# Patient Record
Sex: FEMALE | Race: BLACK OR AFRICAN AMERICAN | Employment: FULL TIME | URBAN - METROPOLITAN AREA
[De-identification: names, ages, dates, MRNs, and addresses within clinical notes are randomized per-mention and may not be internally consistent; named-entity substitution may affect disease eponyms.]

---

## 2017-01-23 ENCOUNTER — NURSE ONLY (OUTPATIENT)
Dept: ALLERGY | Facility: CLINIC | Age: 37
End: 2017-01-23

## 2017-01-23 DIAGNOSIS — J30.89 ENVIRONMENTAL AND SEASONAL ALLERGIES: Primary | ICD-10-CM

## 2017-01-23 PROCEDURE — 95117 IMMUNOTHERAPY INJECTIONS: CPT | Performed by: ALLERGY & IMMUNOLOGY

## 2017-01-23 RX ORDER — LEVOCETIRIZINE DIHYDROCHLORIDE 5 MG/1
TABLET, FILM COATED ORAL
Qty: 90 TABLET | Refills: 0 | Status: SHIPPED | OUTPATIENT
Start: 2017-01-23 | End: 2017-02-22

## 2017-01-23 NOTE — TELEPHONE ENCOUNTER
Call reviewed and noted. Last seen in November 2016. Xyzal refilled ×90 days.   Patient will need follow-up in approximately 3 months

## 2017-01-23 NOTE — TELEPHONE ENCOUNTER
Informed patient that her medication was refilled x 90 days and she will be due for f/u in 3 months. She verbalized understanding.

## 2017-01-23 NOTE — TELEPHONE ENCOUNTER
Dr. Ron Alvarez,  Pt.  Requesting:  LEVOCETIRIZINE DIHYDROCHLORIDE 5 MG Oral Tab 90 tablet 1 8/1/2016      Sig :  TAKE 1 TABLET BY MOUTH NIGHTLY.      LOV:11/14/16  F/U appt: 5/2017  Last filled; 8/1/16 x 6 months       Please advise on refill request, Thank you

## 2017-02-06 ENCOUNTER — NURSE ONLY (OUTPATIENT)
Dept: ALLERGY | Facility: CLINIC | Age: 37
End: 2017-02-06

## 2017-02-06 DIAGNOSIS — J30.89 ENVIRONMENTAL AND SEASONAL ALLERGIES: Primary | ICD-10-CM

## 2017-02-06 PROCEDURE — 95117 IMMUNOTHERAPY INJECTIONS: CPT | Performed by: ALLERGY & IMMUNOLOGY

## 2017-02-06 PROCEDURE — 95165 ANTIGEN THERAPY SERVICES: CPT | Performed by: ALLERGY & IMMUNOLOGY

## 2017-02-13 ENCOUNTER — NURSE ONLY (OUTPATIENT)
Dept: ALLERGY | Facility: CLINIC | Age: 37
End: 2017-02-13

## 2017-02-13 DIAGNOSIS — J30.89 ENVIRONMENTAL AND SEASONAL ALLERGIES: Primary | ICD-10-CM

## 2017-02-13 PROCEDURE — 95117 IMMUNOTHERAPY INJECTIONS: CPT | Performed by: ALLERGY & IMMUNOLOGY

## 2017-02-20 ENCOUNTER — NURSE ONLY (OUTPATIENT)
Dept: ALLERGY | Facility: CLINIC | Age: 37
End: 2017-02-20

## 2017-02-20 DIAGNOSIS — J30.89 ENVIRONMENTAL AND SEASONAL ALLERGIES: Primary | ICD-10-CM

## 2017-02-20 PROCEDURE — 95117 IMMUNOTHERAPY INJECTIONS: CPT | Performed by: ALLERGY & IMMUNOLOGY

## 2017-02-22 RX ORDER — LEVOCETIRIZINE DIHYDROCHLORIDE 5 MG/1
TABLET, FILM COATED ORAL
Qty: 90 TABLET | Refills: 0 | Status: SHIPPED | OUTPATIENT
Start: 2017-02-22 | End: 2018-06-13

## 2017-02-22 RX ORDER — MONTELUKAST SODIUM 10 MG/1
TABLET ORAL
Qty: 90 TABLET | Refills: 0 | Status: SHIPPED | OUTPATIENT
Start: 2017-02-22 | End: 2017-04-06

## 2017-02-22 NOTE — TELEPHONE ENCOUNTER
Refill requested for:    LEVOCETIRIZINE DIHYDROCHLORIDE 5 MG Oral Tab 90 tablet 0 1/23/2017      Sig :  TAKE 1 TABLET BY MOUTH NIGHTLY.       Montelukast Sodium (SINGULAIR) 10 MG Oral Tab 90 tablet 0 11/14/2016       Sig :  Take 1 tablet (10 mg total) by mo

## 2017-02-22 NOTE — TELEPHONE ENCOUNTER
Noted.  Singulair and Xyzal refilled ×90 days.   Patient will need follow-up visit approximately 3 months

## 2017-02-27 ENCOUNTER — NURSE ONLY (OUTPATIENT)
Dept: ALLERGY | Facility: CLINIC | Age: 37
End: 2017-02-27

## 2017-02-27 DIAGNOSIS — J30.89 ENVIRONMENTAL AND SEASONAL ALLERGIES: Primary | ICD-10-CM

## 2017-02-27 PROCEDURE — 95117 IMMUNOTHERAPY INJECTIONS: CPT | Performed by: ALLERGY & IMMUNOLOGY

## 2017-03-06 ENCOUNTER — NURSE ONLY (OUTPATIENT)
Dept: ALLERGY | Facility: CLINIC | Age: 37
End: 2017-03-06

## 2017-03-06 DIAGNOSIS — J30.89 ENVIRONMENTAL AND SEASONAL ALLERGIES: Primary | ICD-10-CM

## 2017-03-06 PROCEDURE — 95117 IMMUNOTHERAPY INJECTIONS: CPT | Performed by: ALLERGY & IMMUNOLOGY

## 2017-03-13 ENCOUNTER — NURSE ONLY (OUTPATIENT)
Dept: ALLERGY | Facility: CLINIC | Age: 37
End: 2017-03-13

## 2017-03-13 DIAGNOSIS — J30.89 ENVIRONMENTAL AND SEASONAL ALLERGIES: Primary | ICD-10-CM

## 2017-03-13 PROCEDURE — 95165 ANTIGEN THERAPY SERVICES: CPT | Performed by: ALLERGY & IMMUNOLOGY

## 2017-03-13 PROCEDURE — 95117 IMMUNOTHERAPY INJECTIONS: CPT | Performed by: ALLERGY & IMMUNOLOGY

## 2017-03-16 RX ORDER — PSEUDOEPHEDRINE HCL 120 MG/1
120 TABLET, FILM COATED, EXTENDED RELEASE ORAL EVERY 12 HOURS
Qty: 60 TABLET | Refills: 1 | Status: SHIPPED
Start: 2017-03-16 | End: 2017-03-16

## 2017-03-16 NOTE — TELEPHONE ENCOUNTER
LM that rx approved and faxed as requested if any additional questions or concerns to contact office.

## 2017-03-16 NOTE — TELEPHONE ENCOUNTER
Dr. Doc Kawasaki,  Pt.  Requesting:   [facility] 12 HOUR NASAL DECONGESTANT 120 MG Oral Tablet 12 Hr 60 tablet 1 5/10/2016      Sig :  TAKE 1 TABLET BY MOUTH EVERY 12 HOURS       Route:   (none)       Comment:   This request is for a new prescription for a controlled sub

## 2017-03-16 NOTE — TELEPHONE ENCOUNTER
From: Arlette Collins  To: Chloé Goodman MD  Sent: 3/16/2017 2:33 PM CDT  Subject: Medication Renewal Request    Original authorizing provider: MD Arlette Desai would like a refill of the following medications:  [facility] 12 HOUR NASAL

## 2017-03-16 NOTE — TELEPHONE ENCOUNTER
Call reviewed and noted. Prescription refilled ×1 month with 1 refill.   Please fax over as a contained Sudafed

## 2017-03-20 ENCOUNTER — NURSE ONLY (OUTPATIENT)
Dept: ALLERGY | Facility: CLINIC | Age: 37
End: 2017-03-20

## 2017-03-20 DIAGNOSIS — J30.89 ENVIRONMENTAL AND SEASONAL ALLERGIES: Primary | ICD-10-CM

## 2017-03-20 PROCEDURE — 95117 IMMUNOTHERAPY INJECTIONS: CPT | Performed by: ALLERGY & IMMUNOLOGY

## 2017-03-20 RX ORDER — PSEUDOEPHEDRINE HCL 120 MG
TABLET, EXTENDED RELEASE ORAL
Qty: 60 TABLET | Refills: 5 | Status: SHIPPED
Start: 2017-03-20 | End: 2017-03-20

## 2017-03-20 NOTE — TELEPHONE ENCOUNTER
Noted. Pharmacy received a verbal order per Dr. Larry Hansen from previous approved refill request:               Approved      Disp  Refills  Start  End     Pseudoephedrine HCl ER ([facility] 12 HOUR NASAL DECONGESTANT) 120 MG Oral Tablet 12 Hr  60 tablet  1  3/16/2017

## 2017-03-20 NOTE — TELEPHONE ENCOUNTER
Received a duplicate refill request for:     Medications       Approved        Disp Refills Start End     Pseudoephedrine HCl ER ([facility] 12 HOUR NASAL DECONGESTANT) 120 MG Oral Tablet 12 Hr 60 tablet 1 3/16/2017      Sig - Route:   Take 1 tablet (120 mg total)

## 2017-04-03 ENCOUNTER — NURSE ONLY (OUTPATIENT)
Dept: ALLERGY | Facility: CLINIC | Age: 37
End: 2017-04-03

## 2017-04-03 DIAGNOSIS — J30.89 ENVIRONMENTAL AND SEASONAL ALLERGIES: Primary | ICD-10-CM

## 2017-04-03 PROCEDURE — 95117 IMMUNOTHERAPY INJECTIONS: CPT | Performed by: ALLERGY & IMMUNOLOGY

## 2017-04-06 ENCOUNTER — TELEPHONE (OUTPATIENT)
Dept: ALLERGY | Facility: CLINIC | Age: 37
End: 2017-04-06

## 2017-04-06 RX ORDER — MONTELUKAST SODIUM 10 MG/1
TABLET ORAL
Qty: 90 TABLET | Refills: 0 | Status: SHIPPED | OUTPATIENT
Start: 2017-04-06 | End: 2017-05-01

## 2017-04-06 NOTE — TELEPHONE ENCOUNTER
Fax from [facility] refill request for Montelukast 10mg once daily. Pt LOV 11/2016 due in 5/2017, has an appointment 5/1/2017. Medication last ordered 2/2017 would like script sent to mail order. Needs qty 90 for reduced price. Please advise.

## 2017-04-06 NOTE — TELEPHONE ENCOUNTER
Call reviewed and noted. Patient with follow-up appointment in May. Singular refill ×90 days and sent to [facility].

## 2017-04-06 NOTE — TELEPHONE ENCOUNTER
Spoke with  and gave him form for [employer] refill.  filled out form and faxed to [facility] for 90 day refill of Singulair. Spoke with Odell Cabrera at [facility] and cancelled Singulair sent earlier.

## 2017-04-10 ENCOUNTER — NURSE ONLY (OUTPATIENT)
Dept: ALLERGY | Facility: CLINIC | Age: 37
End: 2017-04-10

## 2017-04-10 DIAGNOSIS — J30.89 ENVIRONMENTAL AND SEASONAL ALLERGIES: Primary | ICD-10-CM

## 2017-04-10 PROCEDURE — 95117 IMMUNOTHERAPY INJECTIONS: CPT | Performed by: ALLERGY & IMMUNOLOGY

## 2017-04-17 ENCOUNTER — NURSE ONLY (OUTPATIENT)
Dept: ALLERGY | Facility: CLINIC | Age: 37
End: 2017-04-17

## 2017-04-17 DIAGNOSIS — J30.89 ENVIRONMENTAL AND SEASONAL ALLERGIES: Primary | ICD-10-CM

## 2017-04-17 PROCEDURE — 95117 IMMUNOTHERAPY INJECTIONS: CPT | Performed by: ALLERGY & IMMUNOLOGY

## 2017-05-01 ENCOUNTER — NURSE ONLY (OUTPATIENT)
Dept: ALLERGY | Facility: CLINIC | Age: 37
End: 2017-05-01

## 2017-05-01 ENCOUNTER — OFFICE VISIT (OUTPATIENT)
Dept: ALLERGY | Facility: CLINIC | Age: 37
End: 2017-05-01

## 2017-05-01 VITALS
DIASTOLIC BLOOD PRESSURE: 82 MMHG | TEMPERATURE: 99 F | SYSTOLIC BLOOD PRESSURE: 150 MMHG | HEIGHT: 62 IN | WEIGHT: 202 LBS | HEART RATE: 111 BPM | BODY MASS INDEX: 37.17 KG/M2 | OXYGEN SATURATION: 96 % | RESPIRATION RATE: 17 BRPM

## 2017-05-01 DIAGNOSIS — J30.1 SEASONAL ALLERGIC RHINITIS DUE TO POLLEN: ICD-10-CM

## 2017-05-01 DIAGNOSIS — J45.20 MILD INTERMITTENT ASTHMA WITHOUT COMPLICATION: Primary | ICD-10-CM

## 2017-05-01 DIAGNOSIS — Z91.09 ALLERGY TO AMERICAN HOUSE DUST MITE: ICD-10-CM

## 2017-05-01 DIAGNOSIS — J30.89 ENVIRONMENTAL AND SEASONAL ALLERGIES: Primary | ICD-10-CM

## 2017-05-01 PROCEDURE — 99212 OFFICE O/P EST SF 10 MIN: CPT | Performed by: ALLERGY & IMMUNOLOGY

## 2017-05-01 PROCEDURE — 99214 OFFICE O/P EST MOD 30 MIN: CPT | Performed by: ALLERGY & IMMUNOLOGY

## 2017-05-01 PROCEDURE — 95117 IMMUNOTHERAPY INJECTIONS: CPT | Performed by: ALLERGY & IMMUNOLOGY

## 2017-05-01 PROCEDURE — 95165 ANTIGEN THERAPY SERVICES: CPT | Performed by: ALLERGY & IMMUNOLOGY

## 2017-05-01 RX ORDER — PSEUDOEPHEDRINE HCL 120 MG/1
120 TABLET, FILM COATED, EXTENDED RELEASE ORAL EVERY 12 HOURS
Qty: 60 TABLET | Refills: 5 | Status: SHIPPED
Start: 2017-05-01 | End: 2017-12-27

## 2017-05-01 RX ORDER — MONTELUKAST SODIUM 10 MG/1
TABLET ORAL
Qty: 90 TABLET | Refills: 0 | Status: SHIPPED | OUTPATIENT
Start: 2017-05-01 | End: 2017-06-30

## 2017-05-01 NOTE — PROGRESS NOTES
Arlette Faulkner is a 31year old female. HPI:   No chief complaint on file. Patient is a 68-year-old female who presents for follow-up with a chief complaint of asthma and allergies.     Patient last seen by me on November 14, 2017    Patient has a hi LEVOCETIRIZINE DIHYDROCHLORIDE 5 MG Oral Tab TAKE 1 TABLET BY MOUTH NIGHTLY. Disp: 90 tablet Rfl: 0   Fluticasone Propionate (FLONASE) 50 MCG/ACT Nasal Suspension 2 sprays by Nasal route daily.  Disp: 1 Bottle Rfl: 5   Etodolac 400 MG Oral Tab  Disp:  Rfl without complication  (primary encounter diagnosis)  Seasonal allergic rhinitis due to pollen  Allergy to american house dust mite    1.  Asthma  Mild intermittent   Stable at this time  No ed or pred in interim   -400      Recs: continue with singula

## 2017-05-08 ENCOUNTER — NURSE ONLY (OUTPATIENT)
Dept: ALLERGY | Facility: CLINIC | Age: 37
End: 2017-05-08

## 2017-05-08 DIAGNOSIS — J30.89 ENVIRONMENTAL AND SEASONAL ALLERGIES: Primary | ICD-10-CM

## 2017-05-08 PROCEDURE — 95117 IMMUNOTHERAPY INJECTIONS: CPT | Performed by: ALLERGY & IMMUNOLOGY

## 2017-05-20 ENCOUNTER — NURSE ONLY (OUTPATIENT)
Dept: ALLERGY | Facility: CLINIC | Age: 37
End: 2017-05-20

## 2017-05-20 DIAGNOSIS — J30.89 ENVIRONMENTAL AND SEASONAL ALLERGIES: Primary | ICD-10-CM

## 2017-05-20 PROCEDURE — 95117 IMMUNOTHERAPY INJECTIONS: CPT | Performed by: ALLERGY & IMMUNOLOGY

## 2017-06-05 ENCOUNTER — TELEPHONE (OUTPATIENT)
Dept: ALLERGY | Facility: CLINIC | Age: 37
End: 2017-06-05

## 2017-06-05 NOTE — TELEPHONE ENCOUNTER
Patient was here in office for AIT. Stated she has a \"cold\" with a c/o of sore throat, post nasal drip, and body aches. No fever. Stated she prefers to wait until she feels better to receive AIT. Patient currently on maintenance dose every 2-3 weeks.

## 2017-06-06 ENCOUNTER — PATIENT MESSAGE (OUTPATIENT)
Dept: FAMILY MEDICINE CLINIC | Facility: CLINIC | Age: 37
End: 2017-06-06

## 2017-06-06 ENCOUNTER — PATIENT MESSAGE (OUTPATIENT)
Dept: ALLERGY | Facility: CLINIC | Age: 37
End: 2017-06-06

## 2017-06-07 NOTE — TELEPHONE ENCOUNTER
From: Arlette Collins  To: Patrick Freeman MD  Sent: 6/6/2017 8:51 PM CDT  Subject: Other    Doris Job Dr Landry Mata,    I still have a sour throat from Sunday. When I left the office on Monday , I actually had a fever.  I have an appointment at 11:40 with my prima

## 2017-06-07 NOTE — TELEPHONE ENCOUNTER
No available appointments for schedule 6/7/17. [employer] message sent to pt informing and advising to keep appointment with PCP.

## 2017-06-09 ENCOUNTER — TELEPHONE (OUTPATIENT)
Dept: INTERNAL MEDICINE CLINIC | Facility: CLINIC | Age: 37
End: 2017-06-09

## 2017-06-09 NOTE — TELEPHONE ENCOUNTER
lmtcb transfer acute, needs further assessment and triage.   [employer] message also sent      From: Arlette Collins  To: Alice Zamorano DO  Sent: 6/6/2017 8:25 AM CDT  Subject: Non-Urgent Medical Question    Hello,     I've had a sour throat for about 2 d

## 2017-06-09 NOTE — TELEPHONE ENCOUNTER
From: Arlette Collins  To: Khushi Sharma DO  Sent: 6/6/2017 8:25 AM CDT  Subject: Non-Urgent Medical Question    Hello,     I've had a sour throat for about 2 days and been fighting a fever since yesterday. Is it possible to see the doctor today?  I ca

## 2017-06-12 ENCOUNTER — NURSE ONLY (OUTPATIENT)
Dept: ALLERGY | Facility: CLINIC | Age: 37
End: 2017-06-12

## 2017-06-12 DIAGNOSIS — J30.89 ENVIRONMENTAL AND SEASONAL ALLERGIES: Primary | ICD-10-CM

## 2017-06-12 PROCEDURE — 95117 IMMUNOTHERAPY INJECTIONS: CPT | Performed by: ALLERGY & IMMUNOLOGY

## 2017-06-12 NOTE — TELEPHONE ENCOUNTER
Pt feeling better now , seen and treated by another physician. Informed, Moving forward, when an acute situation arises or a change in your condition occurs, it is most important to call the office and speak with a nurse directly.   These messages may sit

## 2017-06-30 RX ORDER — MONTELUKAST SODIUM 10 MG/1
TABLET ORAL
Qty: 90 TABLET | Refills: 0 | Status: SHIPPED | OUTPATIENT
Start: 2017-06-30 | End: 2017-09-28

## 2017-06-30 NOTE — TELEPHONE ENCOUNTER
Left a message for patient to please contact our office to clarify which pharmacy she would like refill of Singulair sent as RX was sent to a different pharmacy  ( [address]) on 5-1-17. received refill request from [employer] order.   Left message t

## 2017-06-30 NOTE — TELEPHONE ENCOUNTER
Pt got the VM and stts the Correct pharm for this [address] [facility]  Pt is aware office is closed until Monday

## 2017-07-03 NOTE — TELEPHONE ENCOUNTER
LM, notified her of Dr. Lisa Ruiz message as written below. Please call with any further questions or concerns.

## 2017-07-17 ENCOUNTER — NURSE ONLY (OUTPATIENT)
Dept: ALLERGY | Facility: CLINIC | Age: 37
End: 2017-07-17

## 2017-07-17 DIAGNOSIS — J30.89 ENVIRONMENTAL AND SEASONAL ALLERGIES: ICD-10-CM

## 2017-07-17 PROCEDURE — 95117 IMMUNOTHERAPY INJECTIONS: CPT | Performed by: ALLERGY & IMMUNOLOGY

## 2017-08-07 ENCOUNTER — NURSE ONLY (OUTPATIENT)
Dept: ALLERGY | Facility: CLINIC | Age: 37
End: 2017-08-07

## 2017-08-07 DIAGNOSIS — J30.89 ENVIRONMENTAL AND SEASONAL ALLERGIES: ICD-10-CM

## 2017-08-07 PROCEDURE — 95117 IMMUNOTHERAPY INJECTIONS: CPT | Performed by: ALLERGY & IMMUNOLOGY

## 2017-08-07 PROCEDURE — 95165 ANTIGEN THERAPY SERVICES: CPT | Performed by: ALLERGY & IMMUNOLOGY

## 2017-08-28 ENCOUNTER — NURSE ONLY (OUTPATIENT)
Dept: ALLERGY | Facility: CLINIC | Age: 37
End: 2017-08-28

## 2017-08-28 DIAGNOSIS — J30.89 ENVIRONMENTAL AND SEASONAL ALLERGIES: ICD-10-CM

## 2017-08-28 PROCEDURE — 95117 IMMUNOTHERAPY INJECTIONS: CPT | Performed by: ALLERGY & IMMUNOLOGY

## 2017-09-25 ENCOUNTER — NURSE ONLY (OUTPATIENT)
Dept: ALLERGY | Facility: CLINIC | Age: 37
End: 2017-09-25

## 2017-09-25 DIAGNOSIS — J30.89 ENVIRONMENTAL AND SEASONAL ALLERGIES: ICD-10-CM

## 2017-09-25 PROCEDURE — 95117 IMMUNOTHERAPY INJECTIONS: CPT | Performed by: ALLERGY & IMMUNOLOGY

## 2017-09-28 RX ORDER — MONTELUKAST SODIUM 10 MG/1
TABLET ORAL
Qty: 90 TABLET | Refills: 0 | Status: SHIPPED | OUTPATIENT
Start: 2017-09-28 | End: 2017-12-27

## 2017-09-28 NOTE — TELEPHONE ENCOUNTER
Call reviewed and noted. Singular refill ×90 days. Patient due for six-month follow-up in November 2017.   Please schedule

## 2017-09-28 NOTE — TELEPHONE ENCOUNTER
Refill requested for MONTELUKAST TAB 10MG  Will file in chart as: MONTELUKAST SODIUM 10 MG Oral Tab  TAKE 1 TABLET DAILY       Disp: 90 tablet Refills: 0    Class: Normal Start: 9/28/2017   Originally ordered: 10 months ago by Caio Ortiz MD  Last ref

## 2017-10-02 NOTE — TELEPHONE ENCOUNTER
Refill requested for [facility] 12HR DECONGEST 120 MG CPLT  Will file in chart as: PSEUDOEPHEDRINE HCL  MG Oral Tablet 12 Hr  TAKE 1 TABLET BY MOUTH EVERY 12 HOURS       Disp: 60 tablet Refills:     Class: Normal Start: 10/2/2017   Originally ordered: 5 months ago by Anabel Arrieta MD  Last refill: 7/13/2017  To pharmacy: This request is for a new prescription for a controlled substance as required by Federal/State law. Last office visit: 5/1/2017    Previously advised to follow up in Follow-up in 6 months or sooner if needed    F/U currently scheduled? No  Date of last refill:  7/13/2017    ACTION: Routed to Dr. Cookie Kinney for review.

## 2017-11-04 ENCOUNTER — NURSE ONLY (OUTPATIENT)
Dept: ALLERGY | Facility: CLINIC | Age: 37
End: 2017-11-04

## 2017-11-04 DIAGNOSIS — J30.9 ALLERGIC RHINITIS, UNSPECIFIED CHRONICITY, UNSPECIFIED SEASONALITY, UNSPECIFIED TRIGGER: ICD-10-CM

## 2017-11-04 PROCEDURE — 90686 IIV4 VACC NO PRSV 0.5 ML IM: CPT | Performed by: ALLERGY & IMMUNOLOGY

## 2017-11-04 PROCEDURE — 95117 IMMUNOTHERAPY INJECTIONS: CPT | Performed by: ALLERGY & IMMUNOLOGY

## 2017-11-04 PROCEDURE — 90471 IMMUNIZATION ADMIN: CPT | Performed by: ALLERGY & IMMUNOLOGY

## 2017-11-27 ENCOUNTER — NURSE ONLY (OUTPATIENT)
Dept: ALLERGY | Facility: CLINIC | Age: 37
End: 2017-11-27

## 2017-11-27 DIAGNOSIS — J30.81 ALLERGIC RHINITIS DUE TO ANIMAL HAIR AND DANDER, UNSPECIFIED CHRONICITY: ICD-10-CM

## 2017-11-27 PROCEDURE — 95117 IMMUNOTHERAPY INJECTIONS: CPT | Performed by: ALLERGY & IMMUNOLOGY

## 2017-12-04 ENCOUNTER — NURSE ONLY (OUTPATIENT)
Dept: ALLERGY | Facility: CLINIC | Age: 37
End: 2017-12-04

## 2017-12-04 DIAGNOSIS — J30.9 ALLERGIC RHINITIS, UNSPECIFIED CHRONICITY, UNSPECIFIED SEASONALITY, UNSPECIFIED TRIGGER: ICD-10-CM

## 2017-12-04 PROCEDURE — 95117 IMMUNOTHERAPY INJECTIONS: CPT | Performed by: ALLERGY & IMMUNOLOGY

## 2017-12-04 PROCEDURE — 95165 ANTIGEN THERAPY SERVICES: CPT | Performed by: ALLERGY & IMMUNOLOGY

## 2017-12-11 ENCOUNTER — NURSE ONLY (OUTPATIENT)
Dept: ALLERGY | Facility: CLINIC | Age: 37
End: 2017-12-11

## 2017-12-11 DIAGNOSIS — J30.89 ENVIRONMENTAL AND SEASONAL ALLERGIES: ICD-10-CM

## 2017-12-11 PROCEDURE — 95117 IMMUNOTHERAPY INJECTIONS: CPT | Performed by: ALLERGY & IMMUNOLOGY

## 2017-12-27 ENCOUNTER — OFFICE VISIT (OUTPATIENT)
Dept: ALLERGY | Facility: CLINIC | Age: 37
End: 2017-12-27

## 2017-12-27 ENCOUNTER — NURSE ONLY (OUTPATIENT)
Dept: ALLERGY | Facility: CLINIC | Age: 37
End: 2017-12-27

## 2017-12-27 VITALS
HEIGHT: 62 IN | WEIGHT: 196 LBS | OXYGEN SATURATION: 97 % | RESPIRATION RATE: 16 BRPM | TEMPERATURE: 99 F | SYSTOLIC BLOOD PRESSURE: 126 MMHG | DIASTOLIC BLOOD PRESSURE: 84 MMHG | BODY MASS INDEX: 36.07 KG/M2 | HEART RATE: 117 BPM

## 2017-12-27 DIAGNOSIS — J30.89 ENVIRONMENTAL AND SEASONAL ALLERGIES: ICD-10-CM

## 2017-12-27 DIAGNOSIS — J30.1 CHRONIC SEASONAL ALLERGIC RHINITIS DUE TO POLLEN: ICD-10-CM

## 2017-12-27 DIAGNOSIS — J45.20 MILD INTERMITTENT ASTHMA WITHOUT COMPLICATION: Primary | ICD-10-CM

## 2017-12-27 DIAGNOSIS — Z91.09 ALLERGY TO AMERICAN HOUSE DUST MITE: ICD-10-CM

## 2017-12-27 PROCEDURE — 95117 IMMUNOTHERAPY INJECTIONS: CPT | Performed by: ALLERGY & IMMUNOLOGY

## 2017-12-27 PROCEDURE — 99214 OFFICE O/P EST MOD 30 MIN: CPT | Performed by: ALLERGY & IMMUNOLOGY

## 2017-12-27 PROCEDURE — 99212 OFFICE O/P EST SF 10 MIN: CPT | Performed by: ALLERGY & IMMUNOLOGY

## 2017-12-27 PROCEDURE — 94010 BREATHING CAPACITY TEST: CPT | Performed by: ALLERGY & IMMUNOLOGY

## 2017-12-27 RX ORDER — FLUTICASONE PROPIONATE 50 MCG
2 SPRAY, SUSPENSION (ML) NASAL DAILY
Qty: 1 BOTTLE | Refills: 5 | Status: SHIPPED | OUTPATIENT
Start: 2017-12-27 | End: 2021-11-25

## 2017-12-27 RX ORDER — MONTELUKAST SODIUM 10 MG/1
TABLET ORAL
Qty: 90 TABLET | Refills: 1 | Status: SHIPPED | OUTPATIENT
Start: 2017-12-27 | End: 2018-06-13

## 2017-12-27 RX ORDER — PSEUDOEPHEDRINE HCL 120 MG/1
120 TABLET, FILM COATED, EXTENDED RELEASE ORAL EVERY 12 HOURS
Qty: 60 TABLET | Refills: 5 | Status: SHIPPED
Start: 2017-12-27 | End: 2018-10-04

## 2017-12-27 NOTE — TELEPHONE ENCOUNTER
Dr. Mukund Cole, pt has appt scheduled today [email or web address]. Please advise if you will address at that time.  Thank you

## 2017-12-27 NOTE — PROGRESS NOTES
Arlette Santos is a 30year old female. HPI:   No chief complaint on file. Patient is over 10 minutes late for her appointment    Patient is a 62-year-old who presents for follow-up with a chief complaint of asthma and allergies.     Patient has a hist Pseudoephedrine HCl  MG Oral Tablet 12 Hr Take 1 tablet (120 mg total) by mouth every 12 (twelve) hours. Disp: 60 tablet Rfl: 5   LEVOCETIRIZINE DIHYDROCHLORIDE 5 MG Oral Tab TAKE 1 TABLET BY MOUTH NIGHTLY.  Disp: 90 tablet Rfl: 0   Fluticasone Prop no unusual rashes present   Extremities: no edema, cyanosis, or clubbing     ASSESSMENT/PLAN:   Assessment   Mild intermittent asthma without complication  (primary encounter diagnosis)  Chronic seasonal allergic rhinitis due to pollen  Allergy to [place]

## 2018-01-06 ENCOUNTER — PATIENT MESSAGE (OUTPATIENT)
Dept: ALLERGY | Facility: CLINIC | Age: 38
End: 2018-01-06

## 2018-01-06 RX ORDER — ALBUTEROL SULFATE 90 UG/1
2 AEROSOL, METERED RESPIRATORY (INHALATION) EVERY 4 HOURS PRN
Qty: 1 INHALER | Refills: 11 | Status: SHIPPED | OUTPATIENT
Start: 2018-01-06 | End: 2018-04-19

## 2018-01-06 NOTE — TELEPHONE ENCOUNTER
Patient calling and states that she run out  albuterol inhaler and requesting MD to refill it, medication was originally ordered by Dr Cristian Middleton on 3/5/14 but MD is not available,advised that will send the message to Md.       Please reply to alvina: ANG Vargas

## 2018-01-06 NOTE — TELEPHONE ENCOUNTER
Pt calling to inform us that she is completely out of medication. Pt informed Dr. Danna Camacho is out of the office until Tuesday and it may have to be approved by PCP. Pt requesting call back with outcome. Please advise.

## 2018-01-08 ENCOUNTER — NURSE ONLY (OUTPATIENT)
Dept: ALLERGY | Facility: CLINIC | Age: 38
End: 2018-01-08

## 2018-01-08 DIAGNOSIS — J30.89 ENVIRONMENTAL AND SEASONAL ALLERGIES: ICD-10-CM

## 2018-01-08 PROCEDURE — 95117 IMMUNOTHERAPY INJECTIONS: CPT | Performed by: INTERNAL MEDICINE

## 2018-01-08 NOTE — TELEPHONE ENCOUNTER
FYI: Dr. Dima Conner rx refilled by PCP x 1 with 11 refills.    Grey Roman,    Medication Detail     Medication Quantity Refills Start End   Albuterol Sulfate HFA (PROAIR HFA) 108 (90 Base) MCG/ACT Inhalation Aero Soln 1 Inhaler 11

## 2018-01-09 RX ORDER — ALBUTEROL SULFATE 90 UG/1
2 AEROSOL, METERED RESPIRATORY (INHALATION) EVERY 6 HOURS PRN
Qty: 1 INHALER | Refills: 0 | Status: SHIPPED | OUTPATIENT
Start: 2018-01-09 | End: 2018-06-13

## 2018-01-23 ENCOUNTER — HOSPITAL ENCOUNTER (EMERGENCY)
Facility: HOSPITAL | Age: 38
Discharge: HOME | End: 2018-01-23
Attending: EMERGENCY MEDICINE
Payer: COMMERCIAL

## 2018-01-23 ENCOUNTER — NURSE TRIAGE (OUTPATIENT)
Dept: OTHER | Age: 38
End: 2018-01-23

## 2018-01-23 VITALS
HEART RATE: 106 BPM | OXYGEN SATURATION: 99 % | HEIGHT: 62 IN | BODY MASS INDEX: 35.33 KG/M2 | TEMPERATURE: 99 F | SYSTOLIC BLOOD PRESSURE: 129 MMHG | DIASTOLIC BLOOD PRESSURE: 74 MMHG | RESPIRATION RATE: 18 BRPM | WEIGHT: 192 LBS

## 2018-01-23 DIAGNOSIS — K52.9 GASTROENTERITIS: Primary | ICD-10-CM

## 2018-01-23 PROCEDURE — 96361 HYDRATE IV INFUSION ADD-ON: CPT

## 2018-01-23 PROCEDURE — 96375 TX/PRO/DX INJ NEW DRUG ADDON: CPT

## 2018-01-23 PROCEDURE — 99285 EMERGENCY DEPT VISIT HI MDM: CPT

## 2018-01-23 PROCEDURE — 96374 THER/PROPH/DIAG INJ IV PUSH: CPT

## 2018-01-23 PROCEDURE — S0028 INJECTION, FAMOTIDINE, 20 MG: HCPCS | Performed by: EMERGENCY MEDICINE

## 2018-01-23 RX ORDER — ONDANSETRON 4 MG/1
4 TABLET, ORALLY DISINTEGRATING ORAL EVERY 4 HOURS PRN
Qty: 15 TABLET | Refills: 0 | Status: SHIPPED | OUTPATIENT
Start: 2018-01-23 | End: 2018-03-16

## 2018-01-23 RX ORDER — ONDANSETRON 2 MG/ML
4 INJECTION INTRAMUSCULAR; INTRAVENOUS ONCE
Status: COMPLETED | OUTPATIENT
Start: 2018-01-23 | End: 2018-01-23

## 2018-01-23 RX ORDER — RANITIDINE 150 MG/1
150 TABLET ORAL EVERY 12 HOURS
Qty: 20 TABLET | Refills: 0 | Status: SHIPPED | OUTPATIENT
Start: 2018-01-23 | End: 2018-02-02

## 2018-01-23 RX ORDER — ONDANSETRON 4 MG/1
4 TABLET, ORALLY DISINTEGRATING ORAL ONCE
Status: COMPLETED | OUTPATIENT
Start: 2018-01-23 | End: 2018-01-23

## 2018-01-23 RX ORDER — FAMOTIDINE 10 MG/ML
20 INJECTION, SOLUTION INTRAVENOUS ONCE
Status: COMPLETED | OUTPATIENT
Start: 2018-01-23 | End: 2018-01-23

## 2018-01-23 NOTE — TELEPHONE ENCOUNTER
Action Requested: Summary for Provider     []  Critical Lab, Recommendations Needed  [] Need Additional Advice  []   FYI    []   Need Orders  [] Need Medications Sent to Pharmacy  []  Other     SUMMARY: sent to ER- vomiting since 6PM  Yesterday- last vomit

## 2018-01-23 NOTE — ED INITIAL ASSESSMENT (HPI)
Pt to ER with c/o cough, vomiting, diarrhea, fever, and body aches since 6 pm yesterday. Pt states fever this am 101. No medication taken for fever this am per patient. No respiratory distress noted. Pt is alert and oriented x4.

## 2018-01-23 NOTE — ED PROVIDER NOTES
Patient Seen in: [facility] Emergency Department    History   Patient presents with:  Nausea/Vomiting/Diarrhea (gastrointestinal)    Stated Complaint: sent by pcp for dehydration    HPI    Patient complains of vomiting, this began last night, non • Colon Cancer Other      close relative       Smoking status: Never Smoker                                                              Smokeless tobacco: Never Used                      Comment: No household smokers.    Alcohol use: Yes           0.5 oz Prescribed:  Discharge Medication List as of 1/23/2018  5:37 PM    START taking these medications    RaNITidine HCl 150 MG Oral Tab  Take 1 tablet (150 mg total) by mouth every 12 (twelve) hours. , Print Script, Disp-20 tablet, R-0    ondansetron 4 MG Oral

## 2018-01-23 NOTE — ED NOTES
Patient arrives with complaints of N/V/D/F since yesterday. Complains of body aches and chills. Per pt, is unable to keep any oral intake. Per pt, attempted to take Pepto bismol, but was not able to keep it down. Denies SOB/CP.  [facility]

## 2018-01-29 ENCOUNTER — NURSE ONLY (OUTPATIENT)
Dept: ALLERGY | Facility: CLINIC | Age: 38
End: 2018-01-29

## 2018-01-29 DIAGNOSIS — J30.89 ENVIRONMENTAL AND SEASONAL ALLERGIES: ICD-10-CM

## 2018-01-29 PROCEDURE — 95117 IMMUNOTHERAPY INJECTIONS: CPT | Performed by: ALLERGY & IMMUNOLOGY

## 2018-02-12 ENCOUNTER — NURSE ONLY (OUTPATIENT)
Dept: ALLERGY | Facility: CLINIC | Age: 38
End: 2018-02-12

## 2018-02-12 DIAGNOSIS — J30.9 ALLERGIC RHINITIS, UNSPECIFIED SEASONALITY, UNSPECIFIED TRIGGER: ICD-10-CM

## 2018-02-12 PROCEDURE — 95117 IMMUNOTHERAPY INJECTIONS: CPT | Performed by: ALLERGY & IMMUNOLOGY

## 2018-02-12 PROCEDURE — 95165 ANTIGEN THERAPY SERVICES: CPT | Performed by: ALLERGY & IMMUNOLOGY

## 2018-02-26 ENCOUNTER — NURSE ONLY (OUTPATIENT)
Dept: ALLERGY | Facility: CLINIC | Age: 38
End: 2018-02-26

## 2018-02-26 DIAGNOSIS — J30.89 ENVIRONMENTAL AND SEASONAL ALLERGIES: ICD-10-CM

## 2018-02-26 PROCEDURE — 95117 IMMUNOTHERAPY INJECTIONS: CPT | Performed by: ALLERGY & IMMUNOLOGY

## 2018-03-16 ENCOUNTER — OFFICE VISIT (OUTPATIENT)
Dept: OBGYN CLINIC | Facility: CLINIC | Age: 38
End: 2018-03-16

## 2018-03-16 VITALS
SYSTOLIC BLOOD PRESSURE: 122 MMHG | HEIGHT: 62 IN | BODY MASS INDEX: 36.03 KG/M2 | DIASTOLIC BLOOD PRESSURE: 70 MMHG | WEIGHT: 195.81 LBS

## 2018-03-16 DIAGNOSIS — T83.32XA INTRAUTERINE CONTRACEPTIVE DEVICE THREADS LOST, INITIAL ENCOUNTER: ICD-10-CM

## 2018-03-16 DIAGNOSIS — Z01.419 WELL WOMAN EXAM WITH ROUTINE GYNECOLOGICAL EXAM: Primary | ICD-10-CM

## 2018-03-16 DIAGNOSIS — Z01.419 ENCOUNTER FOR GYNECOLOGICAL EXAMINATION WITHOUT ABNORMAL FINDING: ICD-10-CM

## 2018-03-16 PROCEDURE — 99395 PREV VISIT EST AGE 18-39: CPT | Performed by: OBSTETRICS & GYNECOLOGY

## 2018-03-16 NOTE — PROGRESS NOTES
HPI:    Patient ID: April R Nicolette Iglesias is a 30year old female. HPI  Patient here for routine exam.  Reports that needs Mirena IUD out. Had some pain in December right after menses but did not come in. IUD strings not visible on exam today.   Will send fo present. Cardiovascular: Normal rate, regular rhythm and normal heart sounds. No murmur heard. Pulmonary/Chest: Effort normal and breath sounds normal.   Breast Exam:  No masses. No nipple discharge. No adenopathy. Abdominal: Soft.  Bowel sounds a

## 2018-03-20 ENCOUNTER — TELEPHONE (OUTPATIENT)
Dept: OBGYN CLINIC | Facility: CLINIC | Age: 38
End: 2018-03-20

## 2018-03-20 ENCOUNTER — NURSE ONLY (OUTPATIENT)
Dept: ALLERGY | Facility: CLINIC | Age: 38
End: 2018-03-20

## 2018-03-20 ENCOUNTER — HOSPITAL ENCOUNTER (OUTPATIENT)
Dept: ULTRASOUND IMAGING | Facility: HOSPITAL | Age: 38
Discharge: HOME | End: 2018-03-20
Attending: OBSTETRICS & GYNECOLOGY
Payer: COMMERCIAL

## 2018-03-20 DIAGNOSIS — T83.32XA INTRAUTERINE CONTRACEPTIVE DEVICE THREADS LOST, INITIAL ENCOUNTER: ICD-10-CM

## 2018-03-20 DIAGNOSIS — J30.89 ENVIRONMENTAL AND SEASONAL ALLERGIES: ICD-10-CM

## 2018-03-20 LAB — HPV I/H RISK 1 DNA SPEC QL NAA+PROBE: NEGATIVE

## 2018-03-20 PROCEDURE — 95117 IMMUNOTHERAPY INJECTIONS: CPT | Performed by: ALLERGY & IMMUNOLOGY

## 2018-03-20 PROCEDURE — 76856 US EXAM PELVIC COMPLETE: CPT | Performed by: OBSTETRICS & GYNECOLOGY

## 2018-03-20 PROCEDURE — 76830 TRANSVAGINAL US NON-OB: CPT | Performed by: OBSTETRICS & GYNECOLOGY

## 2018-03-20 NOTE — TELEPHONE ENCOUNTER
Wayne Lilly MD  [facility] Clinical [facility]             Pap shows ASCUS but Negative HPV.  This is considered a normal pap.  To repeat pap in one year.  Notify patient

## 2018-03-23 ENCOUNTER — TELEPHONE (OUTPATIENT)
Dept: OBGYN CLINIC | Facility: CLINIC | Age: 38
End: 2018-03-23

## 2018-03-23 NOTE — TELEPHONE ENCOUNTER
Patient informed of pelvic U/S showing IUD in lower uterine segment and of the three moderately sized fibroids.   Discussed making appointment to try and remove IUD in office but if unsuccessful would schedule through [facility] a Hysteroscopic IUD removal.  Radha

## 2018-03-26 ENCOUNTER — TELEPHONE (OUTPATIENT)
Dept: OBGYN CLINIC | Facility: CLINIC | Age: 38
End: 2018-03-26

## 2018-04-03 ENCOUNTER — TELEPHONE (OUTPATIENT)
Dept: ALLERGY | Facility: CLINIC | Age: 38
End: 2018-04-03

## 2018-04-03 NOTE — TELEPHONE ENCOUNTER
Pt is calling requesting a refill on medication MONTELUKAST SODIUM 10 MG Oral Tab  Pt state that she call the pharm and they inform to that she would have to call the office to request  Please advise

## 2018-04-06 ENCOUNTER — OFFICE VISIT (OUTPATIENT)
Dept: OBGYN CLINIC | Facility: CLINIC | Age: 38
End: 2018-04-06

## 2018-04-06 ENCOUNTER — TELEPHONE (OUTPATIENT)
Dept: OBGYN CLINIC | Facility: CLINIC | Age: 38
End: 2018-04-06

## 2018-04-06 VITALS — SYSTOLIC BLOOD PRESSURE: 116 MMHG | DIASTOLIC BLOOD PRESSURE: 68 MMHG | BODY MASS INDEX: 36 KG/M2 | WEIGHT: 196 LBS

## 2018-04-06 DIAGNOSIS — T83.32XA INTRAUTERINE CONTRACEPTIVE DEVICE THREADS LOST, INITIAL ENCOUNTER: Primary | ICD-10-CM

## 2018-04-06 DIAGNOSIS — T83.32XD INTRAUTERINE CONTRACEPTIVE DEVICE THREADS LOST, SUBSEQUENT ENCOUNTER: Primary | ICD-10-CM

## 2018-04-06 PROCEDURE — 99213 OFFICE O/P EST LOW 20 MIN: CPT | Performed by: OBSTETRICS & GYNECOLOGY

## 2018-04-06 RX ORDER — SODIUM CHLORIDE 0.9 % (FLUSH) 0.9 %
10 SYRINGE (ML) INJECTION AS NEEDED
Status: CANCELLED | OUTPATIENT
Start: 2018-04-06

## 2018-04-06 NOTE — TELEPHONE ENCOUNTER
Please schedule patient through [facility] on Wednesday, 4/25/2018, for a Hysteroscopic IUD removal, Possible Exploratory Laparotomy. Diagnosis is Lost IUD threads. No assist needed.   Also please send patient for CBC and T & S a day or two prior to surgery to t

## 2018-04-06 NOTE — PROGRESS NOTES
HPI:    Patient ID: April R Eduin Santo is a 30year old female. HPI  Patient here for F/U after Pelvic U/S shows Mirena IUD in low segment.   Patient here for another attempt for removal in office before scheduling Hysteroscopic IUD removal.  Risks of surge EXAM:   Physical Exam   Constitutional: She is oriented to person, place, and time. She appears well-developed and well-nourished. HENT:   Head: Normocephalic. Neck: Normal range of motion. Neck supple. No thyromegaly present.    Cardiovascular: Normal

## 2018-04-07 NOTE — TELEPHONE ENCOUNTER
Per pt VM ok to leave delatiled message. Message left for pt to contact [id number] [place] [address] at [phone] to request refill on file be filled. Pt to contact office if any additional issues or concerns.

## 2018-04-16 ENCOUNTER — TELEPHONE (OUTPATIENT)
Dept: OBGYN CLINIC | Facility: CLINIC | Age: 38
End: 2018-04-16

## 2018-04-21 NOTE — H&P
[facility]    PATIENT'S NAME: Irene Ponceceferino   ATTENDING PHYSICIAN: Stewart Malin MD   PATIENT ACCOUNT#:   [id number]    LOCATION:  [facility]  MEDICAL RECORD #:   [id number]       YOB: 1980  ADMISSION DATE:       04/25/2018 fibroids. Adnexa:  No adnexal masses. ASSESSMENT:  Intrauterine device threads lost.    PLAN:  Patient is scheduled for hysteroscopic IUD removal on Wednesday, April 25, 2018, possible exploratory laparotomy.   The patient is to do her preop labs the

## 2018-04-23 ENCOUNTER — NURSE ONLY (OUTPATIENT)
Dept: ALLERGY | Facility: CLINIC | Age: 38
End: 2018-04-23

## 2018-04-23 DIAGNOSIS — J30.89 ENVIRONMENTAL AND SEASONAL ALLERGIES: ICD-10-CM

## 2018-04-23 PROCEDURE — 95117 IMMUNOTHERAPY INJECTIONS: CPT | Performed by: ALLERGY & IMMUNOLOGY

## 2018-04-25 ENCOUNTER — ANESTHESIA (OUTPATIENT)
Dept: SURGERY | Facility: HOSPITAL | Age: 38
End: 2018-04-25
Payer: COMMERCIAL

## 2018-04-25 ENCOUNTER — SURGERY (OUTPATIENT)
Age: 38
End: 2018-04-25

## 2018-04-25 ENCOUNTER — HOSPITAL ENCOUNTER (OUTPATIENT)
Facility: HOSPITAL | Age: 38
Setting detail: HOSPITAL OUTPATIENT SURGERY
Discharge: HOME | End: 2018-04-25
Attending: OBSTETRICS & GYNECOLOGY | Admitting: OBSTETRICS & GYNECOLOGY
Payer: COMMERCIAL

## 2018-04-25 ENCOUNTER — ANESTHESIA EVENT (OUTPATIENT)
Dept: SURGERY | Facility: HOSPITAL | Age: 38
End: 2018-04-25
Payer: COMMERCIAL

## 2018-04-25 VITALS
DIASTOLIC BLOOD PRESSURE: 84 MMHG | TEMPERATURE: 98 F | SYSTOLIC BLOOD PRESSURE: 135 MMHG | HEIGHT: 62 IN | RESPIRATION RATE: 20 BRPM | BODY MASS INDEX: 35.7 KG/M2 | WEIGHT: 194 LBS | HEART RATE: 86 BPM | OXYGEN SATURATION: 98 %

## 2018-04-25 PROCEDURE — 0UC98ZZ EXTIRPATION OF MATTER FROM UTERUS, VIA NATURAL OR ARTIFICIAL OPENING ENDOSCOPIC: ICD-10-PCS | Performed by: OBSTETRICS & GYNECOLOGY

## 2018-04-25 PROCEDURE — 58562 HYSTEROSCOPY REMOVE FB: CPT | Performed by: OBSTETRICS & GYNECOLOGY

## 2018-04-25 RX ORDER — SODIUM CHLORIDE 0.9 % (FLUSH) 0.9 %
10 SYRINGE (ML) INJECTION AS NEEDED
Status: DISCONTINUED | OUTPATIENT
Start: 2018-04-25 | End: 2018-04-25 | Stop reason: HOSPADM

## 2018-04-25 RX ORDER — ONDANSETRON 2 MG/ML
INJECTION INTRAMUSCULAR; INTRAVENOUS AS NEEDED
Status: DISCONTINUED | OUTPATIENT
Start: 2018-04-25 | End: 2018-04-25 | Stop reason: SURG

## 2018-04-25 RX ORDER — FAMOTIDINE 20 MG/1
20 TABLET ORAL ONCE
Status: COMPLETED | OUTPATIENT
Start: 2018-04-25 | End: 2018-04-25

## 2018-04-25 RX ORDER — HYDROMORPHONE HYDROCHLORIDE 1 MG/ML
0.2 INJECTION, SOLUTION INTRAMUSCULAR; INTRAVENOUS; SUBCUTANEOUS EVERY 5 MIN PRN
Status: DISCONTINUED | OUTPATIENT
Start: 2018-04-25 | End: 2018-04-25

## 2018-04-25 RX ORDER — ONDANSETRON 2 MG/ML
4 INJECTION INTRAMUSCULAR; INTRAVENOUS ONCE AS NEEDED
Status: DISCONTINUED | OUTPATIENT
Start: 2018-04-25 | End: 2018-04-25

## 2018-04-25 RX ORDER — HALOPERIDOL 5 MG/ML
0.25 INJECTION INTRAMUSCULAR ONCE AS NEEDED
Status: DISCONTINUED | OUTPATIENT
Start: 2018-04-25 | End: 2018-04-25

## 2018-04-25 RX ORDER — LIDOCAINE HYDROCHLORIDE 10 MG/ML
INJECTION, SOLUTION EPIDURAL; INFILTRATION; INTRACAUDAL; PERINEURAL AS NEEDED
Status: DISCONTINUED | OUTPATIENT
Start: 2018-04-25 | End: 2018-04-25 | Stop reason: SURG

## 2018-04-25 RX ORDER — SODIUM CHLORIDE, SODIUM LACTATE, POTASSIUM CHLORIDE, CALCIUM CHLORIDE 600; 310; 30; 20 MG/100ML; MG/100ML; MG/100ML; MG/100ML
INJECTION, SOLUTION INTRAVENOUS CONTINUOUS
Status: DISCONTINUED | OUTPATIENT
Start: 2018-04-25 | End: 2018-04-25

## 2018-04-25 RX ORDER — HYDROCODONE BITARTRATE AND ACETAMINOPHEN 5; 325 MG/1; MG/1
2 TABLET ORAL AS NEEDED
Status: DISCONTINUED | OUTPATIENT
Start: 2018-04-25 | End: 2018-04-25

## 2018-04-25 RX ORDER — IBUPROFEN 200 MG
200 TABLET ORAL EVERY 4 HOURS PRN
Status: DISCONTINUED | OUTPATIENT
Start: 2018-04-25 | End: 2018-04-25

## 2018-04-25 RX ORDER — MIDAZOLAM HYDROCHLORIDE 1 MG/ML
INJECTION INTRAMUSCULAR; INTRAVENOUS AS NEEDED
Status: DISCONTINUED | OUTPATIENT
Start: 2018-04-25 | End: 2018-04-25 | Stop reason: SURG

## 2018-04-25 RX ORDER — ACETAMINOPHEN 500 MG
1000 TABLET ORAL ONCE
Status: COMPLETED | OUTPATIENT
Start: 2018-04-25 | End: 2018-04-25

## 2018-04-25 RX ORDER — ONDANSETRON 4 MG/1
4 TABLET, FILM COATED ORAL EVERY 8 HOURS PRN
Status: DISCONTINUED | OUTPATIENT
Start: 2018-04-25 | End: 2018-04-25

## 2018-04-25 RX ORDER — HYDROMORPHONE HYDROCHLORIDE 1 MG/ML
0.6 INJECTION, SOLUTION INTRAMUSCULAR; INTRAVENOUS; SUBCUTANEOUS EVERY 5 MIN PRN
Status: DISCONTINUED | OUTPATIENT
Start: 2018-04-25 | End: 2018-04-25

## 2018-04-25 RX ORDER — IBUPROFEN 400 MG/1
400 TABLET ORAL EVERY 4 HOURS PRN
Status: DISCONTINUED | OUTPATIENT
Start: 2018-04-25 | End: 2018-04-25

## 2018-04-25 RX ORDER — HYDROMORPHONE HYDROCHLORIDE 1 MG/ML
0.4 INJECTION, SOLUTION INTRAMUSCULAR; INTRAVENOUS; SUBCUTANEOUS EVERY 5 MIN PRN
Status: DISCONTINUED | OUTPATIENT
Start: 2018-04-25 | End: 2018-04-25

## 2018-04-25 RX ORDER — HYDROCODONE BITARTRATE AND ACETAMINOPHEN 5; 325 MG/1; MG/1
1 TABLET ORAL AS NEEDED
Status: DISCONTINUED | OUTPATIENT
Start: 2018-04-25 | End: 2018-04-25

## 2018-04-25 RX ORDER — GLYCOPYRROLATE 0.2 MG/ML
INJECTION, SOLUTION INTRAMUSCULAR; INTRAVENOUS AS NEEDED
Status: DISCONTINUED | OUTPATIENT
Start: 2018-04-25 | End: 2018-04-25 | Stop reason: SURG

## 2018-04-25 RX ORDER — METOCLOPRAMIDE 10 MG/1
10 TABLET ORAL ONCE
Status: COMPLETED | OUTPATIENT
Start: 2018-04-25 | End: 2018-04-25

## 2018-04-25 RX ORDER — NALOXONE HYDROCHLORIDE 0.4 MG/ML
80 INJECTION, SOLUTION INTRAMUSCULAR; INTRAVENOUS; SUBCUTANEOUS AS NEEDED
Status: DISCONTINUED | OUTPATIENT
Start: 2018-04-25 | End: 2018-04-25

## 2018-04-25 RX ORDER — MAGNESIUM HYDROXIDE 1200 MG/15ML
LIQUID ORAL CONTINUOUS PRN
Status: DISCONTINUED | OUTPATIENT
Start: 2018-04-25 | End: 2018-04-25

## 2018-04-25 RX ORDER — DEXAMETHASONE SODIUM PHOSPHATE 4 MG/ML
VIAL (ML) INJECTION AS NEEDED
Status: DISCONTINUED | OUTPATIENT
Start: 2018-04-25 | End: 2018-04-25 | Stop reason: SURG

## 2018-04-25 RX ORDER — CEFAZOLIN SODIUM/WATER 2 G/20 ML
2 SYRINGE (ML) INTRAVENOUS ONCE
Status: COMPLETED | OUTPATIENT
Start: 2018-04-25 | End: 2018-04-25

## 2018-04-25 RX ORDER — ONDANSETRON 2 MG/ML
4 INJECTION INTRAMUSCULAR; INTRAVENOUS EVERY 8 HOURS PRN
Status: DISCONTINUED | OUTPATIENT
Start: 2018-04-25 | End: 2018-04-25

## 2018-04-25 RX ORDER — IBUPROFEN 600 MG/1
600 TABLET ORAL EVERY 4 HOURS PRN
Status: DISCONTINUED | OUTPATIENT
Start: 2018-04-25 | End: 2018-04-25

## 2018-04-25 RX ADMIN — CEFAZOLIN SODIUM/WATER 2 G: 2 G/20 ML SYRINGE (ML) INTRAVENOUS at 09:01:00

## 2018-04-25 RX ADMIN — DEXAMETHASONE SODIUM PHOSPHATE 4 MG: 4 MG/ML VIAL (ML) INJECTION at 08:57:00

## 2018-04-25 RX ADMIN — SODIUM CHLORIDE, SODIUM LACTATE, POTASSIUM CHLORIDE, CALCIUM CHLORIDE: 600; 310; 30; 20 INJECTION, SOLUTION INTRAVENOUS at 08:57:00

## 2018-04-25 RX ADMIN — SODIUM CHLORIDE, SODIUM LACTATE, POTASSIUM CHLORIDE, CALCIUM CHLORIDE: 600; 310; 30; 20 INJECTION, SOLUTION INTRAVENOUS at 09:15:00

## 2018-04-25 RX ADMIN — LIDOCAINE HYDROCHLORIDE 50 MG: 10 INJECTION, SOLUTION EPIDURAL; INFILTRATION; INTRACAUDAL; PERINEURAL at 08:57:00

## 2018-04-25 RX ADMIN — MIDAZOLAM HYDROCHLORIDE 2 MG: 1 INJECTION INTRAMUSCULAR; INTRAVENOUS at 08:57:00

## 2018-04-25 RX ADMIN — GLYCOPYRROLATE 0.2 MG: 0.2 INJECTION, SOLUTION INTRAMUSCULAR; INTRAVENOUS at 09:01:00

## 2018-04-25 RX ADMIN — ONDANSETRON 4 MG: 2 INJECTION INTRAMUSCULAR; INTRAVENOUS at 08:57:00

## 2018-04-25 NOTE — INTERVAL H&P NOTE
Pre-op Diagnosis: Lost intrauterine device     The above referenced H&P was reviewed by Tk Gray MD on 4/25/2018, the patient was examined and no significant changes have occurred in the patient's condition since the H&P was performed.   I discuss

## 2018-04-25 NOTE — ANESTHESIA PREPROCEDURE EVALUATION
Anesthesia PreOp Note    HPI:     April R Palak Hough is a 30year old female who presents for preoperative consultation requested by: Delilah Mayo MD    Date of Surgery: 4/25/2018    Procedure(s):   HYSTEROSCOPY DIAGNOSTIC  Indication: Lost intrauterine Take 400 mg by mouth daily as needed. Disp:  Rfl:  4/5/2018   Doxepin HCl (SINEQUAN) 10 MG Oral Cap Take 10 mg by mouth every evening. Disp:  Rfl:  4/24/2018 at 2330   lamoTRIgine (LAMICTAL) 25 MG Oral Tab Take 25 mg by mouth every evening.    Disp:  Rf FB  Neck ROM: full  Dental - normal exam     Pulmonary - normal exam   (+) asthma, sleep apnea,   Cardiovascular   Exercise tolerance: good    Rhythm: regular  Rate: normal    Neuro/Psych    (+) neuromuscular disease,     GI/Hepatic/Renal - negative ROS

## 2018-04-25 NOTE — OPERATIVE REPORT
[facility]    PATIENT'S NAME: Deedee Boxer   ATTENDING PHYSICIAN: Lydia Mon MD   OPERATING PHYSICIAN: Mari Mon MD   PATIENT ACCOUNT#:   [id number]    LOCATION:  [facility] PACU 3 [facility]  MEDICAL RECORD #:   [id number]       D 10:03:34  [facility] [id number]  [facility]/

## 2018-04-25 NOTE — BRIEF OP NOTE
Pre-Operative Diagnosis: Lost intrauterine device      Post-Operative Diagnosis: Same     Procedure Performed:   Procedure(s):  Hysteroscopic intrauterine device removal    Surgeon(s) and Role:     Morgan Quintanilla MD - Primary    Assistant(s):

## 2018-04-25 NOTE — ANESTHESIA POSTPROCEDURE EVALUATION
Patient: April R Dnenis    Procedure Summary     Date:  04/25/18 Room / Location:  [address] [place] [address] MAIN OR 01 / [address] [facility] OR    Anesthesia Start:  3399 Anesthesia Stop:      Procedure:  HYSTEROSCOPY DIAGNOSTIC (N/A ) Diagnosis:  (Lost intrauterine device )    Surgeon:

## 2018-05-10 ENCOUNTER — OFFICE VISIT (OUTPATIENT)
Dept: OBGYN CLINIC | Facility: CLINIC | Age: 38
End: 2018-05-10

## 2018-05-10 VITALS
SYSTOLIC BLOOD PRESSURE: 123 MMHG | DIASTOLIC BLOOD PRESSURE: 86 MMHG | TEMPERATURE: 98 F | HEART RATE: 97 BPM | WEIGHT: 195 LBS | BODY MASS INDEX: 36 KG/M2

## 2018-05-10 DIAGNOSIS — Z09 SURGERY FOLLOW-UP: Primary | ICD-10-CM

## 2018-05-10 PROCEDURE — 99024 POSTOP FOLLOW-UP VISIT: CPT | Performed by: OBSTETRICS & GYNECOLOGY

## 2018-05-10 NOTE — PROGRESS NOTES
HPI:    Patient ID: April R Britney Bishop is a 30year old female. HPI  Patient here for F/U after IUD removal through [facility] with hysteroscope. No C/Os. Reports having withdrawal bleed after Mirena IUD removal.  Discussed keeping track of menses for now.   If oriented to person, place, and time. Skin: Skin is warm and dry. Psychiatric: She has a normal mood and affect. Her behavior is normal. Judgment and thought content normal.   Nursing note and vitals reviewed.              ASSESSMENT/PLAN:   Surgery foll

## 2018-05-23 ENCOUNTER — NURSE ONLY (OUTPATIENT)
Dept: ALLERGY | Facility: CLINIC | Age: 38
End: 2018-05-23

## 2018-05-23 DIAGNOSIS — J30.89 ENVIRONMENTAL AND SEASONAL ALLERGIES: ICD-10-CM

## 2018-05-23 PROCEDURE — 95117 IMMUNOTHERAPY INJECTIONS: CPT | Performed by: ALLERGY & IMMUNOLOGY

## 2018-06-13 ENCOUNTER — OFFICE VISIT (OUTPATIENT)
Dept: ALLERGY | Facility: CLINIC | Age: 38
End: 2018-06-13

## 2018-06-13 ENCOUNTER — NURSE ONLY (OUTPATIENT)
Dept: ALLERGY | Facility: CLINIC | Age: 38
End: 2018-06-13

## 2018-06-13 VITALS
HEART RATE: 100 BPM | DIASTOLIC BLOOD PRESSURE: 90 MMHG | SYSTOLIC BLOOD PRESSURE: 120 MMHG | HEIGHT: 62 IN | TEMPERATURE: 98 F | BODY MASS INDEX: 35.88 KG/M2 | OXYGEN SATURATION: 96 % | WEIGHT: 195 LBS

## 2018-06-13 DIAGNOSIS — J30.89 ENVIRONMENTAL AND SEASONAL ALLERGIES: ICD-10-CM

## 2018-06-13 DIAGNOSIS — J30.1 SEASONAL ALLERGIC RHINITIS DUE TO POLLEN: ICD-10-CM

## 2018-06-13 DIAGNOSIS — J45.20 MILD INTERMITTENT ASTHMA WITHOUT COMPLICATION: Primary | ICD-10-CM

## 2018-06-13 DIAGNOSIS — Z91.09 ALLERGY TO AMERICAN HOUSE DUST MITE: ICD-10-CM

## 2018-06-13 PROCEDURE — 99214 OFFICE O/P EST MOD 30 MIN: CPT | Performed by: ALLERGY & IMMUNOLOGY

## 2018-06-13 PROCEDURE — 94010 BREATHING CAPACITY TEST: CPT | Performed by: ALLERGY & IMMUNOLOGY

## 2018-06-13 PROCEDURE — 99212 OFFICE O/P EST SF 10 MIN: CPT | Performed by: ALLERGY & IMMUNOLOGY

## 2018-06-13 PROCEDURE — 95117 IMMUNOTHERAPY INJECTIONS: CPT | Performed by: ALLERGY & IMMUNOLOGY

## 2018-06-13 PROCEDURE — 95165 ANTIGEN THERAPY SERVICES: CPT | Performed by: ALLERGY & IMMUNOLOGY

## 2018-06-13 RX ORDER — MONTELUKAST SODIUM 10 MG/1
TABLET ORAL
Qty: 90 TABLET | Refills: 1 | Status: SHIPPED | OUTPATIENT
Start: 2018-06-13 | End: 2018-12-10

## 2018-06-13 RX ORDER — LEVOCETIRIZINE DIHYDROCHLORIDE 5 MG/1
TABLET, FILM COATED ORAL
Qty: 90 TABLET | Refills: 1 | Status: SHIPPED | OUTPATIENT
Start: 2018-06-13

## 2018-06-13 RX ORDER — AMOXICILLIN 500 MG/1
CAPSULE ORAL
COMMUNITY
Start: 2018-06-07 | End: 2018-12-17 | Stop reason: ALTCHOICE

## 2018-06-13 RX ORDER — ALBUTEROL SULFATE 90 UG/1
2 AEROSOL, METERED RESPIRATORY (INHALATION) EVERY 6 HOURS PRN
Qty: 1 INHALER | Refills: 0 | Status: SHIPPED | OUTPATIENT
Start: 2018-06-13 | End: 2021-05-21

## 2018-06-13 RX ORDER — IBUPROFEN 800 MG/1
TABLET ORAL
COMMUNITY
Start: 2018-06-07 | End: 2019-02-14

## 2018-06-13 NOTE — PROGRESS NOTES
Arlette Foster is a 30year old female.     HPI:   Patient presents with:  Asthma: follow up  Allergic Rxn Allergies (immune)  pt 15 minutes late for her appt     Pt is a 29 yo female who presents for follow up with a cc of allergies and asthma    Pt last Pseudoephedrine HCl  MG Oral Tablet 12 Hr Take 1 tablet (120 mg total) by mouth every 12 (twelve) hours. Disp: 60 tablet Rfl: 5   LEVOCETIRIZINE DIHYDROCHLORIDE 5 MG Oral Tab TAKE 1 TABLET BY MOUTH NIGHTLY.  Disp: 90 tablet Rfl: 0   Etodolac 400 MG pollen  Allergy to american house dust mite    Asthma control test today is 25 out of 25  Spirometry today is normal with an FEV1 103% and FVC of 97%    1.  AR  Stable and controlled at this time with current regimen including maintenance dose immunotherapy

## 2018-06-26 ENCOUNTER — PATIENT MESSAGE (OUTPATIENT)
Dept: ALLERGY | Facility: CLINIC | Age: 38
End: 2018-06-26

## 2018-06-26 NOTE — TELEPHONE ENCOUNTER
See triage below. Pt denies currently being febrile, does c/o cough with productive \"yellow, cloudy mucus\". Pt does c/o some shortness of breath due to cough, denies having to use her rescue  Albuterol Inhaler since Saturday 6/23/2018.

## 2018-06-26 NOTE — TELEPHONE ENCOUNTER
Reviewed and noted. Symptoms have been since mid week last week for approximately 5 days now. Continue with recommendations as noted in previous note.  May offer the 430 appointment later today the patient feels she needs to be seen for acute visit

## 2018-06-26 NOTE — TELEPHONE ENCOUNTER
Pt contacted, given Dr. Alessandro Cedeno advice below to continue with Xyzal, Singulair, Flonase and in addition add sinus rinses from OTC and start on a trial of Delsym Cough suppressant OTC, take as directed by the labels.  Pt denies needing to be seen today, pass

## 2018-06-26 NOTE — TELEPHONE ENCOUNTER
Pt contacted via telephone.         Problem and Assessment)     Pt c/o what seamed like a upper and lower respiratory viral infection that first developed Wednesday 6/20/2018 with a \"sore throat\", she notes that by Thursday 6/21/2018 she developed sinus a

## 2018-06-26 NOTE — TELEPHONE ENCOUNTER
From: April KHADIJAH Solano  To: Huan Trevizo MD  Sent: 6/26/2018 8:00 AM CDT  Subject: Non-Urgent Medical Question    Hello,     I have developed a really strong cough from being sick since Wednesday (last week).  I'm pretty sure that it is coming from the

## 2018-06-26 NOTE — TELEPHONE ENCOUNTER
Question reviewed and noted.   Please inquire if patient is also using her Xyzal and Singulair in addition to her Flonase  If she is using Xyzal and Singulair as well as Flonase then please have her start sinus rinses and consider a trial of Delsym cough [place]

## 2018-07-09 ENCOUNTER — NURSE ONLY (OUTPATIENT)
Dept: ALLERGY | Facility: CLINIC | Age: 38
End: 2018-07-09

## 2018-07-09 DIAGNOSIS — J30.89 ENVIRONMENTAL AND SEASONAL ALLERGIES: ICD-10-CM

## 2018-07-09 PROCEDURE — 95117 IMMUNOTHERAPY INJECTIONS: CPT | Performed by: ALLERGY & IMMUNOLOGY

## 2018-08-06 ENCOUNTER — TELEPHONE (OUTPATIENT)
Dept: ALLERGY | Facility: CLINIC | Age: 38
End: 2018-08-06

## 2018-08-07 NOTE — TELEPHONE ENCOUNTER
Pt contacted, asks when she can present for next AIT injection without being reduced in dosage. Pt was last seen 7/9/2018. Pt informed she has up to 35 days from last injection date before she would need to be reduced.  Pt verbalized understanding of infor

## 2018-08-13 ENCOUNTER — NURSE ONLY (OUTPATIENT)
Dept: ALLERGY | Facility: CLINIC | Age: 38
End: 2018-08-13
Payer: COMMERCIAL

## 2018-08-13 DIAGNOSIS — J30.89 ENVIRONMENTAL AND SEASONAL ALLERGIES: ICD-10-CM

## 2018-08-13 PROCEDURE — 95117 IMMUNOTHERAPY INJECTIONS: CPT | Performed by: ALLERGY & IMMUNOLOGY

## 2018-09-12 ENCOUNTER — NURSE ONLY (OUTPATIENT)
Dept: ALLERGY | Facility: CLINIC | Age: 38
End: 2018-09-12
Payer: COMMERCIAL

## 2018-09-12 DIAGNOSIS — J30.89 ENVIRONMENTAL AND SEASONAL ALLERGIES: ICD-10-CM

## 2018-09-12 PROCEDURE — 95117 IMMUNOTHERAPY INJECTIONS: CPT | Performed by: ALLERGY & IMMUNOLOGY

## 2018-10-04 RX ORDER — PSEUDOEPHEDRINE HCL 120 MG/1
120 TABLET, FILM COATED, EXTENDED RELEASE ORAL EVERY 12 HOURS
Qty: 60 TABLET | Refills: 1 | Status: ON HOLD
Start: 2018-10-04 | End: 2019-03-19

## 2018-10-04 NOTE — TELEPHONE ENCOUNTER
Refill requested for   Pseudoephedrine HCl  MG Oral Tablet 12 Hr 60 tablet 5 12/27/2017    Sig :  Take 1 tablet (120 mg total) by mouth every 12 (twelve) hours.      Route:   Oral         Last office visit: 6/13/2018    Previously advised to follow up

## 2018-10-04 NOTE — TELEPHONE ENCOUNTER
Sudafed Prescription approved. Please fax printed prescription over to pharmacy on Saturday once it prints .  F/u in Dec 2018

## 2018-10-06 NOTE — TELEPHONE ENCOUNTER
Message left on pt's mobile voice mail that requested Rx was sent to her [facility] pharmacy.

## 2018-10-15 ENCOUNTER — NURSE ONLY (OUTPATIENT)
Dept: ALLERGY | Facility: CLINIC | Age: 38
End: 2018-10-15
Payer: COMMERCIAL

## 2018-10-15 DIAGNOSIS — J30.89 ENVIRONMENTAL AND SEASONAL ALLERGIES: ICD-10-CM

## 2018-10-15 PROCEDURE — 95117 IMMUNOTHERAPY INJECTIONS: CPT | Performed by: ALLERGY & IMMUNOLOGY

## 2018-11-12 ENCOUNTER — NURSE ONLY (OUTPATIENT)
Dept: ALLERGY | Facility: CLINIC | Age: 38
End: 2018-11-12
Payer: COMMERCIAL

## 2018-11-12 ENCOUNTER — TELEPHONE (OUTPATIENT)
Dept: ALLERGY | Facility: CLINIC | Age: 38
End: 2018-11-12

## 2018-11-12 DIAGNOSIS — J30.89 ENVIRONMENTAL AND SEASONAL ALLERGIES: ICD-10-CM

## 2018-11-12 DIAGNOSIS — Z23 FLU VACCINE NEED: ICD-10-CM

## 2018-11-12 PROCEDURE — 90471 IMMUNIZATION ADMIN: CPT | Performed by: ALLERGY & IMMUNOLOGY

## 2018-11-12 PROCEDURE — 90686 IIV4 VACC NO PRSV 0.5 ML IM: CPT | Performed by: ALLERGY & IMMUNOLOGY

## 2018-11-12 PROCEDURE — 95165 ANTIGEN THERAPY SERVICES: CPT | Performed by: ALLERGY & IMMUNOLOGY

## 2018-11-12 PROCEDURE — 95117 IMMUNOTHERAPY INJECTIONS: CPT | Performed by: ALLERGY & IMMUNOLOGY

## 2018-11-13 NOTE — TELEPHONE ENCOUNTER
Patient here in office for AIT.     Requesting for flu vaccine today after 30 minutes observation for AIT.     LOV: 6/13/18 (dx mild intermittent asthma without complication, seasonal allergic rhinitis due to pollen, allergy to american house dust mite)

## 2018-11-13 NOTE — TELEPHONE ENCOUNTER
Order for flu vaccine entered in computer system.   Okay to administer flu vaccine at the end of her allergy shot visit

## 2018-12-10 ENCOUNTER — TELEPHONE (OUTPATIENT)
Dept: ALLERGY | Facility: CLINIC | Age: 38
End: 2018-12-10

## 2018-12-10 RX ORDER — MONTELUKAST SODIUM 10 MG/1
TABLET ORAL
Qty: 30 TABLET | Refills: 0 | Status: SHIPPED | OUTPATIENT
Start: 2018-12-10 | End: 2018-12-17

## 2018-12-10 NOTE — TELEPHONE ENCOUNTER
Patient is due for allergy injections no later than Dec 17 (that is day 35). Pt cancelled her 7pm appointment due to work. Rescheduled for 7pm on January 7th. Pt will come in for allergy injections on Wednesday morning.  Requests a 30 day supply of Si

## 2018-12-10 NOTE — TELEPHONE ENCOUNTER
Pt called she wants to know what day she needs to come in before he has to go back a dosage - limit on days for her allergy shot. Said she does not know what day she is at.  What is the latest she can come and get shot done

## 2018-12-17 ENCOUNTER — NURSE ONLY (OUTPATIENT)
Dept: ALLERGY | Facility: CLINIC | Age: 38
End: 2018-12-17
Payer: COMMERCIAL

## 2018-12-17 ENCOUNTER — OFFICE VISIT (OUTPATIENT)
Dept: ALLERGY | Facility: CLINIC | Age: 38
End: 2018-12-17
Payer: COMMERCIAL

## 2018-12-17 VITALS
TEMPERATURE: 98 F | SYSTOLIC BLOOD PRESSURE: 172 MMHG | HEIGHT: 62 IN | BODY MASS INDEX: 35.88 KG/M2 | HEART RATE: 106 BPM | WEIGHT: 195 LBS | OXYGEN SATURATION: 97 % | DIASTOLIC BLOOD PRESSURE: 100 MMHG

## 2018-12-17 DIAGNOSIS — J45.20 MILD INTERMITTENT ASTHMA WITHOUT COMPLICATION: Primary | ICD-10-CM

## 2018-12-17 DIAGNOSIS — J30.1 SEASONAL ALLERGIC RHINITIS DUE TO POLLEN: ICD-10-CM

## 2018-12-17 DIAGNOSIS — J30.89 ENVIRONMENTAL AND SEASONAL ALLERGIES: ICD-10-CM

## 2018-12-17 DIAGNOSIS — Z91.09 ALLERGY TO AMERICAN HOUSE DUST MITE: ICD-10-CM

## 2018-12-17 PROCEDURE — 95117 IMMUNOTHERAPY INJECTIONS: CPT | Performed by: ALLERGY & IMMUNOLOGY

## 2018-12-17 PROCEDURE — 99214 OFFICE O/P EST MOD 30 MIN: CPT | Performed by: ALLERGY & IMMUNOLOGY

## 2018-12-17 PROCEDURE — 99212 OFFICE O/P EST SF 10 MIN: CPT | Performed by: ALLERGY & IMMUNOLOGY

## 2018-12-17 RX ORDER — MONTELUKAST SODIUM 10 MG/1
10 TABLET ORAL NIGHTLY
Qty: 90 TABLET | Refills: 1 | Status: SHIPPED | OUTPATIENT
Start: 2018-12-17 | End: 2019-08-03

## 2018-12-18 NOTE — PROGRESS NOTES
Arlette Ruff is a 41year old female. HPI:   Patient presents with: Follow - Up  Asthma    Patient is a 72-year-old female who presents for follow-up with a chief complaint of asthma and allergies.       Patient currently on ait to heather vasquez dm     Asthm Oral Tablet 12 Hr Take 1 tablet (120 mg total) by mouth every 12 (twelve) hours.  (Patient taking differently: Take 120 mg by mouth 2 (two) times daily as needed.  ) Disp: 60 tablet Rfl: 1   ibuprofen 800 MG Oral Tab  Disp:  Rfl:    Levocetirizine Dihydroch bilaterally normal respiratory effort   Cardiovascular: regular rate and rhythm no murmurs, gallups, or rubs  Abdomen: soft non-tender non-distended  Skin/Hair: no unusual rashes present   Extremities: no edema, cyanosis, or clubbing     ASSESSMENT/PLAN:

## 2019-01-21 ENCOUNTER — NURSE ONLY (OUTPATIENT)
Dept: ALLERGY | Facility: CLINIC | Age: 39
End: 2019-01-21
Payer: COMMERCIAL

## 2019-01-21 DIAGNOSIS — J30.89 ENVIRONMENTAL AND SEASONAL ALLERGIES: ICD-10-CM

## 2019-01-21 PROCEDURE — 95117 IMMUNOTHERAPY INJECTIONS: CPT | Performed by: ALLERGY & IMMUNOLOGY

## 2019-02-14 ENCOUNTER — OFFICE VISIT (OUTPATIENT)
Dept: OBGYN CLINIC | Facility: CLINIC | Age: 39
End: 2019-02-14
Payer: COMMERCIAL

## 2019-02-14 VITALS
SYSTOLIC BLOOD PRESSURE: 138 MMHG | HEIGHT: 62 IN | WEIGHT: 195 LBS | BODY MASS INDEX: 35.88 KG/M2 | DIASTOLIC BLOOD PRESSURE: 82 MMHG

## 2019-02-14 DIAGNOSIS — N92.6 MISSED MENSES: Primary | ICD-10-CM

## 2019-02-14 PROCEDURE — 99214 OFFICE O/P EST MOD 30 MIN: CPT | Performed by: OBSTETRICS & GYNECOLOGY

## 2019-02-14 NOTE — PROGRESS NOTES
HPI:    Patient ID: April R Petey Rivera is a 41year old female. Patient here for PCV. Discussed PNC and PNV. Optional and required screening reviewed. Miscarriage Precautions reviewed. Patient has H/O Migraines, anxiety and depression.   Seed Neurologis Wheezing. Disp: 1 Inhaler Rfl: 0     Allergies:No Known Allergies   PHYSICAL EXAM:   Physical Exam   Constitutional: She is oriented to person, place, and time. She appears well-developed and well-nourished.    Neurological: She is alert and oriented to per

## 2019-02-16 ENCOUNTER — APPOINTMENT (OUTPATIENT)
Dept: LAB | Age: 39
End: 2019-02-16
Attending: OBSTETRICS & GYNECOLOGY
Payer: COMMERCIAL

## 2019-02-16 DIAGNOSIS — N93.9 VAGINAL BLEEDING: ICD-10-CM

## 2019-02-16 LAB
B-HCG SERPL-ACNC: 333 MIU/ML
RH BLOOD TYPE: POSITIVE

## 2019-02-16 PROCEDURE — 36415 COLL VENOUS BLD VENIPUNCTURE: CPT

## 2019-02-16 PROCEDURE — 86900 BLOOD TYPING SEROLOGIC ABO: CPT

## 2019-02-16 PROCEDURE — 86901 BLOOD TYPING SEROLOGIC RH(D): CPT

## 2019-02-16 PROCEDURE — 84702 CHORIONIC GONADOTROPIN TEST: CPT

## 2019-02-18 ENCOUNTER — TELEPHONE (OUTPATIENT)
Dept: OBGYN CLINIC | Facility: CLINIC | Age: 39
End: 2019-02-18

## 2019-02-18 ENCOUNTER — APPOINTMENT (OUTPATIENT)
Dept: LAB | Facility: HOSPITAL | Age: 39
End: 2019-02-18
Attending: OBSTETRICS & GYNECOLOGY
Payer: COMMERCIAL

## 2019-02-18 DIAGNOSIS — N93.9 VAGINAL BLEEDING: Primary | ICD-10-CM

## 2019-02-18 DIAGNOSIS — N93.9 VAGINAL BLEEDING: ICD-10-CM

## 2019-02-18 LAB — B-HCG SERPL-ACNC: 519 MIU/ML

## 2019-02-18 PROCEDURE — 84702 CHORIONIC GONADOTROPIN TEST: CPT

## 2019-02-18 PROCEDURE — 36415 COLL VENOUS BLD VENIPUNCTURE: CPT

## 2019-02-18 NOTE — TELEPHONE ENCOUNTER
Informed pt of message below and E-mail message from Dr. Hernandez Point        ----- Message from Zaheer Shine MD sent at 2/18/2019  8:52 AM CST -----  Quant BHCG is 333. Patient should be going for another Quant BHCG today. Notify patient.         Jesse Laird

## 2019-02-18 NOTE — TELEPHONE ENCOUNTER
Cortes Toscano MD  [facility]             Patient informed that Bryanna Perfect has risen to 519.  Patient states vaginal bleeding is still the same.  Discussed repeating Quant BHCG in 2 days.  Please place order for another Quant BHCG

## 2019-02-20 ENCOUNTER — TELEPHONE (OUTPATIENT)
Dept: OBGYN CLINIC | Facility: CLINIC | Age: 39
End: 2019-02-20

## 2019-02-20 ENCOUNTER — APPOINTMENT (OUTPATIENT)
Dept: LAB | Age: 39
End: 2019-02-20
Attending: OBSTETRICS & GYNECOLOGY
Payer: COMMERCIAL

## 2019-02-20 DIAGNOSIS — N93.9 VAGINAL BLEEDING: ICD-10-CM

## 2019-02-20 DIAGNOSIS — O20.0 THREATENED ABORTION, ANTEPARTUM: Primary | ICD-10-CM

## 2019-02-20 LAB — B-HCG SERPL-ACNC: 720 MIU/ML

## 2019-02-20 PROCEDURE — 84702 CHORIONIC GONADOTROPIN TEST: CPT

## 2019-02-20 PROCEDURE — 36415 COLL VENOUS BLD VENIPUNCTURE: CPT

## 2019-02-20 NOTE — TELEPHONE ENCOUNTER
----- Message from Peyton Marley MD sent at 2/20/2019  1:56 PM CST -----  Patient informed that [facility] is not rising normally. Miscarriage and Ectopic Precautions were reviewed with patient Please order Serial Quant BHCGs x 3.   Patient will go Fri

## 2019-02-22 ENCOUNTER — APPOINTMENT (OUTPATIENT)
Dept: LAB | Age: 39
End: 2019-02-22
Attending: OBSTETRICS & GYNECOLOGY
Payer: COMMERCIAL

## 2019-02-22 ENCOUNTER — TELEPHONE (OUTPATIENT)
Dept: OBGYN CLINIC | Facility: CLINIC | Age: 39
End: 2019-02-22

## 2019-02-22 DIAGNOSIS — N93.9 VAGINAL BLEEDING: ICD-10-CM

## 2019-02-22 LAB — B-HCG SERPL-ACNC: 856 MIU/ML

## 2019-02-22 PROCEDURE — 84702 CHORIONIC GONADOTROPIN TEST: CPT

## 2019-02-22 PROCEDURE — 36415 COLL VENOUS BLD VENIPUNCTURE: CPT

## 2019-02-22 NOTE — TELEPHONE ENCOUNTER
RN returned call to patient who is inquiring about quant hcg drawn at 10 am this morning. Upon chart review, lab is still in process. RN advises patient of pending results. Pt reports not feeling well today.   Pt with known hx of migraines endorses migra

## 2019-02-23 ENCOUNTER — PATIENT MESSAGE (OUTPATIENT)
Dept: OBGYN CLINIC | Facility: CLINIC | Age: 39
End: 2019-02-23

## 2019-02-23 ENCOUNTER — HOSPITAL ENCOUNTER (OUTPATIENT)
Dept: ULTRASOUND IMAGING | Facility: HOSPITAL | Age: 39
Discharge: HOME | End: 2019-02-23
Attending: OBSTETRICS & GYNECOLOGY
Payer: COMMERCIAL

## 2019-02-23 DIAGNOSIS — O20.0 THREATENED ABORTION, ANTEPARTUM: ICD-10-CM

## 2019-02-23 DIAGNOSIS — O20.0 THREATENED ABORTION, ANTEPARTUM: Primary | ICD-10-CM

## 2019-02-23 PROCEDURE — 76817 TRANSVAGINAL US OBSTETRIC: CPT | Performed by: OBSTETRICS & GYNECOLOGY

## 2019-02-23 PROCEDURE — 76801 OB US < 14 WKS SINGLE FETUS: CPT | Performed by: OBSTETRICS & GYNECOLOGY

## 2019-02-23 NOTE — TELEPHONE ENCOUNTER
Order entered. U/S advises pt to drink 32 oz of water and present to u/s for scan by noon. RN placed call to patient advised pt of appt. Pt verbalizes understanding and agrees with plan. On call - ASJ notified.

## 2019-02-23 NOTE — TELEPHONE ENCOUNTER
Spoke with April and informed [facility] but not a normal increase. Discussed miscarriage and Ectopic Precautions. Patient reports spotting dark blood and very mild cramping but no strong pains.   Please send patient for Hold a

## 2019-02-24 ENCOUNTER — APPOINTMENT (OUTPATIENT)
Dept: LAB | Facility: HOSPITAL | Age: 39
End: 2019-02-24
Attending: OBSTETRICS & GYNECOLOGY
Payer: COMMERCIAL

## 2019-02-24 DIAGNOSIS — O20.0 THREATENED ABORTION, ANTEPARTUM: ICD-10-CM

## 2019-02-24 LAB — B-HCG SERPL-ACNC: 632 MIU/ML

## 2019-02-24 PROCEDURE — 36415 COLL VENOUS BLD VENIPUNCTURE: CPT

## 2019-02-24 PROCEDURE — 84702 CHORIONIC GONADOTROPIN TEST: CPT

## 2019-02-25 ENCOUNTER — NURSE ONLY (OUTPATIENT)
Dept: ALLERGY | Facility: CLINIC | Age: 39
End: 2019-02-25
Payer: COMMERCIAL

## 2019-02-25 ENCOUNTER — TELEPHONE (OUTPATIENT)
Dept: OBGYN CLINIC | Facility: CLINIC | Age: 39
End: 2019-02-25

## 2019-02-25 DIAGNOSIS — O02.1 MISSED ABORTION: Primary | ICD-10-CM

## 2019-02-25 DIAGNOSIS — J30.89 ENVIRONMENTAL AND SEASONAL ALLERGIES: ICD-10-CM

## 2019-02-25 PROCEDURE — 95117 IMMUNOTHERAPY INJECTIONS: CPT | Performed by: ALLERGY & IMMUNOLOGY

## 2019-02-25 NOTE — TELEPHONE ENCOUNTER
----- Message from Vega Sandoval MD sent at 2/24/2019  6:58 PM CST -----  Informed patient that her Quant BHCG is dropping now. No need to have F/U Ultrasound. Please ignore previous message saying to order F/U U/S.   Also enter another standing order

## 2019-02-25 NOTE — TELEPHONE ENCOUNTER
From: Arlette Collins  To: Vannessa Miguel MD  Sent: 2/23/2019 1:45 PM CST  Subject: Test Results Question    Hi Dr Peggy Russell,    I had the ultrasound and they told me I can go home. I'm a bit confuse as to what was found.  Do I still go tomorrow for more

## 2019-02-25 NOTE — TELEPHONE ENCOUNTER
See other encounter and email communication. Pt has since been in communication with MLM regarding symptoms, results and plan of care.

## 2019-02-26 ENCOUNTER — APPOINTMENT (OUTPATIENT)
Dept: LAB | Facility: HOSPITAL | Age: 39
End: 2019-02-26
Attending: OBSTETRICS & GYNECOLOGY
Payer: COMMERCIAL

## 2019-02-26 DIAGNOSIS — O20.0 THREATENED ABORTION, ANTEPARTUM: ICD-10-CM

## 2019-02-26 LAB — B-HCG SERPL-ACNC: 516 MIU/ML

## 2019-02-26 PROCEDURE — 36415 COLL VENOUS BLD VENIPUNCTURE: CPT

## 2019-02-26 PROCEDURE — 84702 CHORIONIC GONADOTROPIN TEST: CPT

## 2019-03-04 ENCOUNTER — TELEPHONE (OUTPATIENT)
Dept: OBGYN CLINIC | Facility: CLINIC | Age: 39
End: 2019-03-04

## 2019-03-04 ENCOUNTER — APPOINTMENT (OUTPATIENT)
Dept: LAB | Facility: HOSPITAL | Age: 39
End: 2019-03-04
Attending: OBSTETRICS & GYNECOLOGY
Payer: COMMERCIAL

## 2019-03-04 DIAGNOSIS — O02.1 MISSED ABORTION: Primary | ICD-10-CM

## 2019-03-04 DIAGNOSIS — O02.1 MISSED ABORTION: ICD-10-CM

## 2019-03-04 LAB — B-HCG SERPL-ACNC: 679 MIU/ML

## 2019-03-04 PROCEDURE — 84702 CHORIONIC GONADOTROPIN TEST: CPT

## 2019-03-04 PROCEDURE — 36415 COLL VENOUS BLD VENIPUNCTURE: CPT

## 2019-03-05 ENCOUNTER — TELEPHONE (OUTPATIENT)
Dept: OBGYN CLINIC | Facility: CLINIC | Age: 39
End: 2019-03-05

## 2019-03-05 NOTE — TELEPHONE ENCOUNTER
----- Message from Rufino Paris MD sent at 3/4/2019  6:08 PM CST -----  Patient informed that Teresa Pilon has risen instead of fallen. Patient states vaginal bleeding has increased slightly but no pelvic pain and not soaking a pad an hour.   Recommende

## 2019-03-05 NOTE — TELEPHONE ENCOUNTER
3/5/2019 pt was due for her 1 yr f/u pap due to ASCUS -HPV. Pt had an appt on 3/22/2019 but was cancelled. Pt has not scheduled any appt. Recall letter was sent 2/26/2019.                   NACHO Lopez Ink

## 2019-03-06 ENCOUNTER — HOSPITAL ENCOUNTER (OUTPATIENT)
Facility: HOSPITAL | Age: 39
Setting detail: OBSERVATION
Discharge: HOME | End: 2019-03-06
Attending: OBSTETRICS & GYNECOLOGY | Admitting: OBSTETRICS & GYNECOLOGY
Payer: COMMERCIAL

## 2019-03-06 ENCOUNTER — APPOINTMENT (OUTPATIENT)
Dept: LAB | Age: 39
End: 2019-03-06
Attending: FAMILY MEDICINE
Payer: COMMERCIAL

## 2019-03-06 ENCOUNTER — APPOINTMENT (OUTPATIENT)
Dept: ULTRASOUND IMAGING | Facility: HOSPITAL | Age: 39
End: 2019-03-06
Attending: OBSTETRICS & GYNECOLOGY
Payer: COMMERCIAL

## 2019-03-06 ENCOUNTER — TELEPHONE (OUTPATIENT)
Dept: OBGYN CLINIC | Facility: CLINIC | Age: 39
End: 2019-03-06

## 2019-03-06 ENCOUNTER — HOSPITAL ENCOUNTER (OUTPATIENT)
Facility: HOSPITAL | Age: 39
Setting detail: OBSERVATION
End: 2019-03-06
Attending: OBSTETRICS & GYNECOLOGY | Admitting: OBSTETRICS & GYNECOLOGY
Payer: COMMERCIAL

## 2019-03-06 VITALS
WEIGHT: 191 LBS | HEART RATE: 96 BPM | DIASTOLIC BLOOD PRESSURE: 82 MMHG | TEMPERATURE: 98 F | RESPIRATION RATE: 18 BRPM | SYSTOLIC BLOOD PRESSURE: 140 MMHG | BODY MASS INDEX: 35.15 KG/M2 | HEIGHT: 62 IN

## 2019-03-06 DIAGNOSIS — O02.1 MISSED ABORTION: ICD-10-CM

## 2019-03-06 DIAGNOSIS — O36.80X0 PREGNANCY OF UNKNOWN ANATOMIC LOCATION: Primary | ICD-10-CM

## 2019-03-06 LAB
ALBUMIN SERPL-MCNC: 3.6 G/DL (ref 3.4–5)
ALBUMIN/GLOB SERPL: 0.9 {RATIO} (ref 1–2)
ALP LIVER SERPL-CCNC: 60 U/L (ref 37–98)
ALT SERPL-CCNC: 32 U/L (ref 13–56)
ANION GAP SERPL CALC-SCNC: 8 MMOL/L (ref 0–18)
AST SERPL-CCNC: 15 U/L (ref 15–37)
B-HCG SERPL-ACNC: 741 MIU/ML
BASOPHILS # BLD AUTO: 0.05 X10(3) UL (ref 0–0.2)
BASOPHILS NFR BLD AUTO: 0.7 %
BILIRUB SERPL-MCNC: 0.4 MG/DL (ref 0.1–2)
BUN BLD-MCNC: 8 MG/DL (ref 7–18)
BUN/CREAT SERPL: 11.1 (ref 10–20)
CALCIUM BLD-MCNC: 8.7 MG/DL (ref 8.5–10.1)
CHLORIDE SERPL-SCNC: 106 MMOL/L (ref 98–107)
CO2 SERPL-SCNC: 25 MMOL/L (ref 21–32)
CREAT BLD-MCNC: 0.72 MG/DL (ref 0.55–1.02)
DEPRECATED RDW RBC AUTO: 41.1 FL (ref 35.1–46.3)
EOSINOPHIL # BLD AUTO: 0.1 X10(3) UL (ref 0–0.7)
EOSINOPHIL NFR BLD AUTO: 1.4 %
ERYTHROCYTE [DISTWIDTH] IN BLOOD BY AUTOMATED COUNT: 13 % (ref 11–15)
GLOBULIN PLAS-MCNC: 3.8 G/DL (ref 2.8–4.4)
GLUCOSE BLD-MCNC: 200 MG/DL (ref 70–99)
HCT VFR BLD AUTO: 35.5 % (ref 35–48)
HGB BLD-MCNC: 11.6 G/DL (ref 12–16)
IMM GRANULOCYTES # BLD AUTO: 0.02 X10(3) UL (ref 0–1)
IMM GRANULOCYTES NFR BLD: 0.3 %
LYMPHOCYTES # BLD AUTO: 3.52 X10(3) UL (ref 1–4)
LYMPHOCYTES NFR BLD AUTO: 50.4 %
M PROTEIN MFR SERPL ELPH: 7.4 G/DL (ref 6.4–8.2)
MCH RBC QN AUTO: 28.6 PG (ref 26–34)
MCHC RBC AUTO-ENTMCNC: 32.7 G/DL (ref 31–37)
MCV RBC AUTO: 87.7 FL (ref 80–100)
MONOCYTES # BLD AUTO: 0.45 X10(3) UL (ref 0.1–1)
MONOCYTES NFR BLD AUTO: 6.4 %
NEUTROPHILS # BLD AUTO: 2.84 X10 (3) UL (ref 1.5–7.7)
NEUTROPHILS # BLD AUTO: 2.84 X10(3) UL (ref 1.5–7.7)
NEUTROPHILS NFR BLD AUTO: 40.8 %
OSMOLALITY SERPL CALC.SUM OF ELEC: 292 MOSM/KG (ref 275–295)
PLATELET # BLD AUTO: 304 10(3)UL (ref 150–450)
POTASSIUM SERPL-SCNC: 3.5 MMOL/L (ref 3.5–5.1)
RBC # BLD AUTO: 4.05 X10(6)UL (ref 3.8–5.3)
SODIUM SERPL-SCNC: 139 MMOL/L (ref 136–145)
WBC # BLD AUTO: 7 X10(3) UL (ref 4–11)

## 2019-03-06 PROCEDURE — 99220 INITIAL OBSERVATION CARE,LEVL III: CPT | Performed by: OBSTETRICS & GYNECOLOGY

## 2019-03-06 PROCEDURE — 36415 COLL VENOUS BLD VENIPUNCTURE: CPT

## 2019-03-06 PROCEDURE — 84702 CHORIONIC GONADOTROPIN TEST: CPT

## 2019-03-06 PROCEDURE — 76817 TRANSVAGINAL US OBSTETRIC: CPT | Performed by: OBSTETRICS & GYNECOLOGY

## 2019-03-06 PROCEDURE — 76801 OB US < 14 WKS SINGLE FETUS: CPT | Performed by: OBSTETRICS & GYNECOLOGY

## 2019-03-06 NOTE — TELEPHONE ENCOUNTER
Patient was just seen by MLM confused as where she suppose to go per [facility] message related to patient needs to be on her way to [facility], patient understands no further questions

## 2019-03-07 NOTE — H&P
[facility]  Obstetrics and Gynecology  Observation history and physical  Levon Nuñez MD    HPI     April R Hua Ybarra is a 41year old  with Patient's last menstrual period was 2019.  who presents for pregnancy of unknown location and vag significant. She has been having spotting or bleeding since 2019. She denies any significant GI concerns and has been tolerating a general diet. She is without any urinary complaints.   OB History     OB History    Para Term  AB Living Substance and Sexual Activity      Alcohol use:  Yes        Alcohol/week: 0.5 oz        Types: 1 Glasses of wine per week        Comment: Rarely      Drug use: No      Sexual activity: Not on file    Lifestyle      Physical activity:        Days per week: N measures 4.7 x 4.4 x 4.0 cm. The endometrium is difficult to evaluate   because the fibroids with appears to be thickened measuring approximately 18.8 mm , but there is no intrauterine fluid collection or gestational sac.   With an HCG of 741, I would not rupture. We discussed that bleeding and pelvic pain/cramping are common side effects. She is to call the office if she has any significant bleeding or pain. She understands all of the above. A CBC and CMP were ordered prior to injection.   She has a typ

## 2019-03-07 NOTE — PROGRESS NOTES
Pt is a 41year old female admitted to TR4/TR4-A. Patient presents with:  Methotrexate Injection: pt is here for a methotrexate injection. Sent to [facility] by Dr Jasper Gore. At 1715, Dr Angelika Treviño was notified that pt is here.  He will evaluate      Pt is

## 2019-03-07 NOTE — PROGRESS NOTES
Dr Olman Velasquez at pt bedside discussing Methotrexate, labs (beta) values, repeat pelvic ultrasound.  And POC,

## 2019-03-07 NOTE — PROGRESS NOTES
Lab results reviewed by Dr. Nicole Ness. Education completed by Dr. Nicole Ness and Octavio Ingram RN. Methotrexate administered by NATALIE Bowen RN. No adverse effects noted.   Written discharge instructions given to patient and explained regarding what to expect, when

## 2019-03-11 ENCOUNTER — APPOINTMENT (OUTPATIENT)
Dept: LAB | Facility: HOSPITAL | Age: 39
End: 2019-03-11
Attending: OBSTETRICS & GYNECOLOGY
Payer: COMMERCIAL

## 2019-03-11 ENCOUNTER — TELEPHONE (OUTPATIENT)
Dept: OBGYN CLINIC | Facility: CLINIC | Age: 39
End: 2019-03-11

## 2019-03-11 DIAGNOSIS — O36.80X0 PREGNANCY OF UNKNOWN ANATOMIC LOCATION: ICD-10-CM

## 2019-03-11 LAB — B-HCG SERPL-ACNC: 533 MIU/ML

## 2019-03-11 PROCEDURE — 36415 COLL VENOUS BLD VENIPUNCTURE: CPT

## 2019-03-11 PROCEDURE — 84702 CHORIONIC GONADOTROPIN TEST: CPT

## 2019-03-11 NOTE — TELEPHONE ENCOUNTER
Pt notified of dropping bhcg. Pt states her bleeding is less and her pain cramping/pain is minimal to moderate. Pt has f/u bhcg in 3 days. Pt to call if she has worsening pain or other concerns.

## 2019-03-14 ENCOUNTER — APPOINTMENT (OUTPATIENT)
Dept: LAB | Facility: HOSPITAL | Age: 39
End: 2019-03-14
Attending: OBSTETRICS & GYNECOLOGY
Payer: COMMERCIAL

## 2019-03-14 ENCOUNTER — TELEPHONE (OUTPATIENT)
Dept: OBGYN CLINIC | Facility: CLINIC | Age: 39
End: 2019-03-14

## 2019-03-14 DIAGNOSIS — O03.9 MISCARRIAGE: Primary | ICD-10-CM

## 2019-03-14 DIAGNOSIS — O36.80X0 PREGNANCY OF UNKNOWN ANATOMIC LOCATION: ICD-10-CM

## 2019-03-14 LAB — B-HCG SERPL-ACNC: 403 MIU/ML

## 2019-03-14 PROCEDURE — 84702 CHORIONIC GONADOTROPIN TEST: CPT

## 2019-03-14 PROCEDURE — 36415 COLL VENOUS BLD VENIPUNCTURE: CPT

## 2019-03-14 NOTE — TELEPHONE ENCOUNTER
Informed pt of message below. Pt voices understanding. --      --- Message from Carrie Lynn MD sent at 3/14/2019  4:30 PM CDT -----  Result noted. Please notify patient that her Santos Neither has dropped and is now 403.   She will need to continue weekly bhcg

## 2019-03-14 NOTE — TELEPHONE ENCOUNTER
----- Message from Chrissie Mcrae MD sent at 3/14/2019  4:30 PM CDT -----  Result noted. Please notify patient that her Ecru Sher has dropped and is now 403. She will need to continue weekly bhcg results until they are negative.   Please set her up for seri

## 2019-03-19 ENCOUNTER — TELEPHONE (OUTPATIENT)
Dept: OBGYN UNIT | Facility: HOSPITAL | Age: 39
End: 2019-03-19

## 2019-03-19 ENCOUNTER — HOSPITAL ENCOUNTER (OUTPATIENT)
Facility: HOSPITAL | Age: 39
Setting detail: OBSERVATION
Discharge: HOME | End: 2019-03-21
Attending: OBSTETRICS & GYNECOLOGY | Admitting: OBSTETRICS & GYNECOLOGY
Payer: COMMERCIAL

## 2019-03-19 ENCOUNTER — APPOINTMENT (OUTPATIENT)
Dept: ULTRASOUND IMAGING | Facility: HOSPITAL | Age: 39
End: 2019-03-19
Attending: NURSE PRACTITIONER
Payer: COMMERCIAL

## 2019-03-19 DIAGNOSIS — N83.299 OVARIAN CYST, COMPLEX: Primary | ICD-10-CM

## 2019-03-19 PROBLEM — O00.109 ECTOPIC PREGNANCY, TUBAL (HCC): Status: ACTIVE | Noted: 2019-03-19

## 2019-03-19 PROBLEM — O00.109 ECTOPIC PREGNANCY, TUBAL: Status: ACTIVE | Noted: 2019-03-19

## 2019-03-19 LAB
ALBUMIN SERPL-MCNC: 3.8 G/DL (ref 3.4–5)
ALP LIVER SERPL-CCNC: 64 U/L (ref 37–98)
ALT SERPL-CCNC: 33 U/L (ref 13–56)
ANION GAP SERPL CALC-SCNC: 6 MMOL/L (ref 0–18)
AST SERPL-CCNC: 14 U/L (ref 15–37)
B-HCG SERPL-ACNC: 111 MIU/ML
B-HCG UR QL: NEGATIVE
BACTERIA UR QL AUTO: NEGATIVE /HPF
BASOPHILS # BLD AUTO: 0.04 X10(3) UL (ref 0–0.2)
BASOPHILS NFR BLD AUTO: 0.7 %
BILIRUB DIRECT SERPL-MCNC: 0.1 MG/DL (ref 0–0.2)
BILIRUB SERPL-MCNC: 0.7 MG/DL (ref 0.1–2)
BILIRUB UR QL: NEGATIVE
BUN BLD-MCNC: 12 MG/DL (ref 7–18)
BUN/CREAT SERPL: 16.4 (ref 10–20)
CALCIUM BLD-MCNC: 9.3 MG/DL (ref 8.5–10.1)
CHLORIDE SERPL-SCNC: 105 MMOL/L (ref 98–107)
CLARITY UR: CLEAR
CO2 SERPL-SCNC: 28 MMOL/L (ref 21–32)
COLOR UR: YELLOW
CREAT BLD-MCNC: 0.73 MG/DL (ref 0.55–1.02)
DEPRECATED RDW RBC AUTO: 42.5 FL (ref 35.1–46.3)
EOSINOPHIL # BLD AUTO: 0.08 X10(3) UL (ref 0–0.7)
EOSINOPHIL NFR BLD AUTO: 1.3 %
ERYTHROCYTE [DISTWIDTH] IN BLOOD BY AUTOMATED COUNT: 13.2 % (ref 11–15)
GLUCOSE BLD-MCNC: 144 MG/DL (ref 70–99)
GLUCOSE UR-MCNC: NEGATIVE MG/DL
HCT VFR BLD AUTO: 37.5 % (ref 35–48)
HGB BLD-MCNC: 12 G/DL (ref 12–16)
IMM GRANULOCYTES # BLD AUTO: 0.01 X10(3) UL (ref 0–1)
IMM GRANULOCYTES NFR BLD: 0.2 %
KETONES UR-MCNC: NEGATIVE MG/DL
LEUKOCYTE ESTERASE UR QL STRIP.AUTO: NEGATIVE
LYMPHOCYTES # BLD AUTO: 2.33 X10(3) UL (ref 1–4)
LYMPHOCYTES NFR BLD AUTO: 38.9 %
M PROTEIN MFR SERPL ELPH: 8 G/DL (ref 6.4–8.2)
MCH RBC QN AUTO: 28.3 PG (ref 26–34)
MCHC RBC AUTO-ENTMCNC: 32 G/DL (ref 31–37)
MCV RBC AUTO: 88.4 FL (ref 80–100)
MONOCYTES # BLD AUTO: 0.43 X10(3) UL (ref 0.1–1)
MONOCYTES NFR BLD AUTO: 7.2 %
NEUTROPHILS # BLD AUTO: 3.1 X10 (3) UL (ref 1.5–7.7)
NEUTROPHILS # BLD AUTO: 3.1 X10(3) UL (ref 1.5–7.7)
NEUTROPHILS NFR BLD AUTO: 51.7 %
NITRITE UR QL STRIP.AUTO: NEGATIVE
OSMOLALITY SERPL CALC.SUM OF ELEC: 290 MOSM/KG (ref 275–295)
PH UR: 6 [PH] (ref 5–8)
PLATELET # BLD AUTO: 329 10(3)UL (ref 150–450)
POTASSIUM SERPL-SCNC: 3.8 MMOL/L (ref 3.5–5.1)
PROT UR-MCNC: NEGATIVE MG/DL
RBC # BLD AUTO: 4.24 X10(6)UL (ref 3.8–5.3)
RBC #/AREA URNS AUTO: <1 /HPF
RH BLOOD TYPE: POSITIVE
SODIUM SERPL-SCNC: 139 MMOL/L (ref 136–145)
SP GR UR STRIP: 1.01 (ref 1–1.03)
UROBILINOGEN UR STRIP-ACNC: <2
VIT C UR-MCNC: NEGATIVE MG/DL
WBC # BLD AUTO: 6 X10(3) UL (ref 4–11)
WBC #/AREA URNS AUTO: 0 /HPF

## 2019-03-19 PROCEDURE — 76817 TRANSVAGINAL US OBSTETRIC: CPT | Performed by: NURSE PRACTITIONER

## 2019-03-19 PROCEDURE — 76801 OB US < 14 WKS SINGLE FETUS: CPT | Performed by: NURSE PRACTITIONER

## 2019-03-19 PROCEDURE — 99219 INITIAL OBSERVATION CARE,LEVL II: CPT | Performed by: OBSTETRICS & GYNECOLOGY

## 2019-03-19 RX ORDER — SODIUM CHLORIDE 0.9 % (FLUSH) 0.9 %
3 SYRINGE (ML) INJECTION AS NEEDED
Status: DISCONTINUED | OUTPATIENT
Start: 2019-03-19 | End: 2019-03-21

## 2019-03-19 RX ORDER — SODIUM CHLORIDE 9 MG/ML
INJECTION, SOLUTION INTRAVENOUS CONTINUOUS
Status: DISCONTINUED | OUTPATIENT
Start: 2019-03-19 | End: 2019-03-19

## 2019-03-19 RX ORDER — HYDROCODONE BITARTRATE AND ACETAMINOPHEN 5; 325 MG/1; MG/1
1 TABLET ORAL EVERY 6 HOURS PRN
Status: DISCONTINUED | OUTPATIENT
Start: 2019-03-19 | End: 2019-03-21

## 2019-03-19 RX ORDER — DEXTROSE AND SODIUM CHLORIDE 5; .9 G/100ML; G/100ML
INJECTION, SOLUTION INTRAVENOUS CONTINUOUS
Status: DISCONTINUED | OUTPATIENT
Start: 2019-03-19 | End: 2019-03-21

## 2019-03-19 RX ORDER — ACETAMINOPHEN 325 MG/1
650 TABLET ORAL EVERY 4 HOURS PRN
Status: DISCONTINUED | OUTPATIENT
Start: 2019-03-19 | End: 2019-03-21

## 2019-03-19 RX ORDER — HYDROCODONE BITARTRATE AND ACETAMINOPHEN 5; 325 MG/1; MG/1
1 TABLET ORAL EVERY 4 HOURS PRN
Status: DISCONTINUED | OUTPATIENT
Start: 2019-03-19 | End: 2019-03-21

## 2019-03-19 NOTE — PLAN OF CARE
April was admitted from the ED to room 465. VSS. Up independently. Norco given for pain. NPO except sips of water.

## 2019-03-19 NOTE — ED PROVIDER NOTES
Patient Seen in: [facility] AND [facility] Emergency Department    History   Patient presents with:  Pregnancy Issues (gynecologic)    Stated Complaint:     HPI    38yr old female to the ER with c/o right lower quadrant pain that started aprox 2 weeks ago.  Pt wa Never Smoker      Smokeless tobacco: Never Used      Tobacco comment: No household smokers. Alcohol use:  Yes      Alcohol/week: 0.5 oz      Types: 1 Glasses of wine per week      Comment: Rarely    Drug use: No      Review of Systems    Positive for st FUNCTION PANEL (7) - Abnormal; Notable for the following components:    AST 14 (*)     All other components within normal limits   HCG, BETA SUBUNIT (QUANT PREGNANCY TEST) - Abnormal; Notable for the following components:    Hcg Quantitative 111.0 (*) encounter diagnosis)    Disposition:  Admit    Follow-up:  [address] [place] [address] 13, [address] Giuliana Crowder   [phone]    Go in 1 day  3/22 9 am For follow-up      Medications Prescribed:  Discharge Medication List as of

## 2019-03-19 NOTE — H&P
[facility] - [facility]    History & Physical     [place] Patient Status:  Emergency    10/24/1980 MRN [id number]   Location [address] Attending No att. providers found   [facility] Day # 0 PCP Lilliam Shock.  DO Kisha Anxiety state    • Asthma    • Bell's palsy 2012   • Depression    • Extrinsic asthma, unspecified     Per NG: Drug therapy   • Migraines    • Sleep apnea     NOT USING MACHINE   • Visual impairment     WEARS GLASSES/CONTACT       Past Surgical History:  P are normal.  Enlarged fibroid uterus to 3 cm above umbilicus. Firm fibroid mass. There is no hepatosplenomegaly. Mild right lowe lateral tenderness. There is no rebound and no CVA tenderness. No hernia.  Hernia confirmed negative in the ventral area, confi evidence of rupture or intraabdominal bleeding. Observe, NPO, IV hydration. PO pain med Norco 5 mg po q 4-6 hrs prn. Bathroom privilages. All of the findings and plan were discussed with the patient.   She notes understanding and agrees with the

## 2019-03-19 NOTE — ED INITIAL ASSESSMENT (HPI)
Pt to ED with lower right sided abdominal and back pain, with pressure when she urinating. She received methotrexate injection for ectopic over 1 week ago and pain began last night.

## 2019-03-20 ENCOUNTER — APPOINTMENT (OUTPATIENT)
Dept: ULTRASOUND IMAGING | Facility: HOSPITAL | Age: 39
End: 2019-03-20
Attending: OBSTETRICS & GYNECOLOGY
Payer: COMMERCIAL

## 2019-03-20 LAB
B-HCG SERPL-ACNC: 90 MIU/ML
BASOPHILS # BLD AUTO: 0.04 X10(3) UL (ref 0–0.2)
BASOPHILS NFR BLD AUTO: 0.8 %
DEPRECATED RDW RBC AUTO: 42.7 FL (ref 35.1–46.3)
EOSINOPHIL # BLD AUTO: 0.17 X10(3) UL (ref 0–0.7)
EOSINOPHIL NFR BLD AUTO: 3.4 %
ERYTHROCYTE [DISTWIDTH] IN BLOOD BY AUTOMATED COUNT: 13.1 % (ref 11–15)
HCT VFR BLD AUTO: 33.9 % (ref 35–48)
HGB BLD-MCNC: 10.8 G/DL (ref 12–16)
IMM GRANULOCYTES # BLD AUTO: 0.01 X10(3) UL (ref 0–1)
IMM GRANULOCYTES NFR BLD: 0.2 %
LYMPHOCYTES # BLD AUTO: 2.5 X10(3) UL (ref 1–4)
LYMPHOCYTES NFR BLD AUTO: 50.3 %
MCH RBC QN AUTO: 28.3 PG (ref 26–34)
MCHC RBC AUTO-ENTMCNC: 31.9 G/DL (ref 31–37)
MCV RBC AUTO: 89 FL (ref 80–100)
MONOCYTES # BLD AUTO: 0.32 X10(3) UL (ref 0.1–1)
MONOCYTES NFR BLD AUTO: 6.4 %
NEUTROPHILS # BLD AUTO: 1.93 X10 (3) UL (ref 1.5–7.7)
NEUTROPHILS # BLD AUTO: 1.93 X10(3) UL (ref 1.5–7.7)
NEUTROPHILS NFR BLD AUTO: 38.9 %
PLATELET # BLD AUTO: 278 10(3)UL (ref 150–450)
RBC # BLD AUTO: 3.81 X10(6)UL (ref 3.8–5.3)
WBC # BLD AUTO: 5 X10(3) UL (ref 4–11)

## 2019-03-20 PROCEDURE — 76856 US EXAM PELVIC COMPLETE: CPT | Performed by: OBSTETRICS & GYNECOLOGY

## 2019-03-20 PROCEDURE — 76830 TRANSVAGINAL US NON-OB: CPT | Performed by: OBSTETRICS & GYNECOLOGY

## 2019-03-20 PROCEDURE — 99225 SUBSEQUENT OBSERVATION CARE: CPT | Performed by: OBSTETRICS & GYNECOLOGY

## 2019-03-20 RX ORDER — ONDANSETRON 2 MG/ML
4 INJECTION INTRAMUSCULAR; INTRAVENOUS EVERY 6 HOURS PRN
Status: DISCONTINUED | OUTPATIENT
Start: 2019-03-20 | End: 2019-03-21

## 2019-03-20 NOTE — PROGRESS NOTES
[facility]    GYNE Progress Note       Mauri Andrewsg Patient Status:  Observation    10/24/1980 MRN [id number]   [facility] 4W/SW/SE Attending Sim Rizzo MD   Hosp Day # 0 PCP Shelly Hartley.  DO Walt Beard 2. Enlarged right ovary with complex area as described. Unremarkable left ovary. Small amount of pelvic free fluid. 3. Findings suggest right ovarian ectopic pregnancy. No definite fetal pole or heart tones identified.  4. Small amount of pelvic free flu

## 2019-03-21 VITALS
RESPIRATION RATE: 16 BRPM | HEART RATE: 77 BPM | WEIGHT: 191.81 LBS | OXYGEN SATURATION: 91 % | DIASTOLIC BLOOD PRESSURE: 76 MMHG | SYSTOLIC BLOOD PRESSURE: 130 MMHG | BODY MASS INDEX: 35 KG/M2 | TEMPERATURE: 98 F

## 2019-03-21 PROCEDURE — 99217 OBSERVATION CARE DISCHARGE: CPT | Performed by: OBSTETRICS & GYNECOLOGY

## 2019-03-21 RX ORDER — CETIRIZINE HYDROCHLORIDE 5 MG/1
5 TABLET ORAL DAILY
Status: DISCONTINUED | OUTPATIENT
Start: 2019-03-21 | End: 2019-03-21

## 2019-03-21 RX ORDER — ACETAMINOPHEN, ASPIRIN AND CAFFEINE 250; 250; 65 MG/1; MG/1; MG/1
1 TABLET, FILM COATED ORAL EVERY 4 HOURS PRN
Status: DISCONTINUED | OUTPATIENT
Start: 2019-03-21 | End: 2019-03-21

## 2019-03-21 RX ORDER — HYDROCODONE BITARTRATE AND ACETAMINOPHEN 5; 325 MG/1; MG/1
1 TABLET ORAL EVERY 4 HOURS PRN
Qty: 20 TABLET | Refills: 0 | Status: SHIPPED | OUTPATIENT
Start: 2019-03-21 | End: 2019-06-04

## 2019-03-21 NOTE — PROGRESS NOTES
HPI:   Arlette Harris is a 41year old female who is being observed in the hospital due to known ectopic pregnancy s/p methotrexate tx 3/6/19,  Pt has had 2 u/s this admission and stable condition.   Pt stated that since admission, her pain is now markedly status: Never Smoker      Smokeless tobacco: Never Used      Tobacco comment: No household smokers. Alcohol use:  Yes      Alcohol/week: 0.5 oz      Types: 1 Glasses of wine per week      Comment: Rarely    Drug use: No           REVIEW OF SYSTEMS:   GE then f/u in office tomorrow morning. Pt to call and go to er for severe pain/bleeding/any other problem.

## 2019-03-22 ENCOUNTER — OFFICE VISIT (OUTPATIENT)
Dept: OBGYN CLINIC | Facility: CLINIC | Age: 39
End: 2019-03-22
Payer: COMMERCIAL

## 2019-03-22 VITALS
BODY MASS INDEX: 35 KG/M2 | SYSTOLIC BLOOD PRESSURE: 130 MMHG | DIASTOLIC BLOOD PRESSURE: 78 MMHG | HEART RATE: 82 BPM | WEIGHT: 189 LBS

## 2019-03-22 DIAGNOSIS — O00.109 TUBAL PREGNANCY WITHOUT INTRAUTERINE PREGNANCY, UNSPECIFIED LATERALITY: Primary | ICD-10-CM

## 2019-03-22 PROCEDURE — 99213 OFFICE O/P EST LOW 20 MIN: CPT | Performed by: OBSTETRICS & GYNECOLOGY

## 2019-03-22 NOTE — PROGRESS NOTES
HPI:   April Lanre Wilde is a 41year old female who presents for a office visit s/p admission to hospital for abd pain/bleeding due to ectopic preg tx with mtx 3/6/19. Pt stated she feels well, drove here,  Eating well.        Wt Readings from Last 6 Encoun Mother    • Diabetes Other         Per NG:Famly history    • Colon Cancer Other         close relative      Social History:   Social History    Tobacco Use      Smoking status: Never Smoker      Smokeless tobacco: Never Used      Tobacco comment: No househ on calling for any problems. All quesitons answered.

## 2019-03-24 ENCOUNTER — PATIENT MESSAGE (OUTPATIENT)
Dept: OBGYN CLINIC | Facility: CLINIC | Age: 39
End: 2019-03-24

## 2019-03-25 NOTE — TELEPHONE ENCOUNTER
Inga Goss LPN received call from Dr. Kendra Navas regarding pt's e-mail today-    From: April KHADIJAH Collins  To:  Robert Sorto MD  Sent: 3/24/2019  2:43 PM CDT  Subject: Visit Baron Reji Navas,       The pain has continued to the point for a long period of time. Pt refuses to go to the ER since she was just seen a few days ago. 5. Pt requesting call from Dr Obi Pastor since Dr. Daniel Monahan is out of town.

## 2019-03-25 NOTE — TELEPHONE ENCOUNTER
From: Arlette Collins  To: Aaron Hickman MD  Sent: 3/24/2019 2:43 PM CDT  Subject: Visit Mohan Guzman,       The pain has continued to the point where I've ended up taking the Narco in the evening.  The pain is sharp on the

## 2019-03-25 NOTE — TELEPHONE ENCOUNTER
Regarding: Visit Follow-up Question  Contact: [phone]  ----- Message from [address] [place] [address] [address] Generic sent at 3/24/2019  2:43 PM CDT -----    Sukumar Holland Knee,       The pain has continued to the point where I've ended up taking the Narco in the evening.  The es

## 2019-03-26 NOTE — TELEPHONE ENCOUNTER
Jeanmarie Coleman MD 18 minutes ago (7:46 PM)         Spoke with patient who says she had been doing well. Saw Dr. Howard Slice 3/22 for f/u from her admission and was doing well. Over weekend felt some on and off pressure and pains.   Today worked 38yr old female to the ER with c/o right lower quadrant pain that started aprox 2 weeks ago. Pt was diagnosed with a pregnancy of unknown location and given methotrexate.  Beta Hcg levels were at 533, on 03/11/2019, 403 on 03/14/2019 and 111 today.  Pt stat Kendra Navas, MD Juan Carlos Harris, April R 2 hours ago (5:47 PM)         Hello April,     I am out of town currently, but I am asking my nurse to have the doctor on call call you now.   If you are in pain or bleeding,  Please proceed to the Emergency room

## 2019-03-26 NOTE — TELEPHONE ENCOUNTER
Spoke with patient who says she had been doing well. Saw Dr. Mervin Nicolas 3/22 for f/u from her admission and was doing well. Over weekend felt some on and off pressure and pains. Today worked and felt more soreness.     Says feels nausea and dizziness at ti

## 2019-03-27 ENCOUNTER — PATIENT MESSAGE (OUTPATIENT)
Dept: ALLERGY | Facility: CLINIC | Age: 39
End: 2019-03-27

## 2019-03-27 ENCOUNTER — APPOINTMENT (OUTPATIENT)
Dept: LAB | Facility: HOSPITAL | Age: 39
End: 2019-03-27
Attending: OBSTETRICS & GYNECOLOGY
Payer: COMMERCIAL

## 2019-03-27 DIAGNOSIS — O02.1 MISSED ABORTION: ICD-10-CM

## 2019-03-27 DIAGNOSIS — R30.0 DYSURIA: ICD-10-CM

## 2019-03-27 LAB
B-HCG SERPL-ACNC: 37 MIU/ML
BILIRUB UR QL: NEGATIVE
COLOR UR: YELLOW
GLUCOSE UR-MCNC: NEGATIVE MG/DL
KETONES UR-MCNC: NEGATIVE MG/DL
NITRITE UR QL STRIP.AUTO: NEGATIVE
PH UR: 5 [PH] (ref 5–8)
PROT UR-MCNC: NEGATIVE MG/DL
RBC #/AREA URNS AUTO: 2 /HPF
SP GR UR STRIP: 1.03 (ref 1–1.03)
UROBILINOGEN UR STRIP-ACNC: <2
VIT C UR-MCNC: NEGATIVE MG/DL
WBC #/AREA URNS AUTO: 3 /HPF

## 2019-03-27 PROCEDURE — 84702 CHORIONIC GONADOTROPIN TEST: CPT

## 2019-03-27 PROCEDURE — 87086 URINE CULTURE/COLONY COUNT: CPT

## 2019-03-27 PROCEDURE — 36415 COLL VENOUS BLD VENIPUNCTURE: CPT

## 2019-03-27 PROCEDURE — 81001 URINALYSIS AUTO W/SCOPE: CPT

## 2019-03-27 NOTE — TELEPHONE ENCOUNTER
From: Arlette Collins  To: Beatriz Meyer MD  Sent: 3/27/2019 8:42 AM CDT  Subject: Non-Urgent Cecy Cooper,    Will you guys be there this Saturday for shots?     Thank you,     Zoran Cade

## 2019-03-28 ENCOUNTER — OFFICE VISIT (OUTPATIENT)
Dept: OBGYN CLINIC | Facility: CLINIC | Age: 39
End: 2019-03-28
Payer: COMMERCIAL

## 2019-03-28 VITALS
SYSTOLIC BLOOD PRESSURE: 118 MMHG | BODY MASS INDEX: 34.78 KG/M2 | WEIGHT: 189 LBS | HEIGHT: 62 IN | DIASTOLIC BLOOD PRESSURE: 72 MMHG

## 2019-03-28 DIAGNOSIS — N83.201 CYST OF RIGHT OVARY: ICD-10-CM

## 2019-03-28 DIAGNOSIS — Z01.419 ENCOUNTER FOR WELL WOMAN EXAM: Primary | ICD-10-CM

## 2019-03-28 DIAGNOSIS — D25.9 UTERINE LEIOMYOMA, UNSPECIFIED LOCATION: ICD-10-CM

## 2019-03-28 DIAGNOSIS — R10.2 PELVIC PAIN IN FEMALE: ICD-10-CM

## 2019-03-28 DIAGNOSIS — Z11.3 ROUTINE SCREENING FOR STI (SEXUALLY TRANSMITTED INFECTION): ICD-10-CM

## 2019-03-28 PROCEDURE — 99214 OFFICE O/P EST MOD 30 MIN: CPT | Performed by: OBSTETRICS & GYNECOLOGY

## 2019-03-28 NOTE — PROGRESS NOTES
HPI:    Patient ID: April R Kelvin Chun is a 41year old female. Patient here for F/U from Methotrexate injection for pregnancy of unknown location. Quant BHCGs are dropping. To continue with weekly Quants until negative.   Patient reports severe abdominal Tab Take 1 tablet by mouth every 4 (four) hours as needed. Disp: 20 tablet Rfl: 0   Montelukast Sodium (SINGULAIR) 10 MG Oral Tab Take 1 tablet (10 mg total) by mouth nightly.  Disp: 90 tablet Rfl: 1   Levocetirizine Dihydrochloride 5 MG Oral Tab TAKE 1 TAB Referrals:  None       WX#[id number]

## 2019-03-29 LAB
HPV I/H RISK 1 DNA SPEC QL NAA+PROBE: NEGATIVE
LAST PAP RESULT: NORMAL
PAP HISTORY (OTHER THAN LAST PAP): NORMAL

## 2019-03-30 ENCOUNTER — LAB ENCOUNTER (OUTPATIENT)
Dept: LAB | Age: 39
End: 2019-03-30
Attending: OBSTETRICS & GYNECOLOGY
Payer: COMMERCIAL

## 2019-03-30 ENCOUNTER — NURSE ONLY (OUTPATIENT)
Dept: ALLERGY | Facility: CLINIC | Age: 39
End: 2019-03-30
Payer: COMMERCIAL

## 2019-03-30 DIAGNOSIS — J30.89 ENVIRONMENTAL AND SEASONAL ALLERGIES: ICD-10-CM

## 2019-03-30 DIAGNOSIS — Z11.3 ROUTINE SCREENING FOR STI (SEXUALLY TRANSMITTED INFECTION): ICD-10-CM

## 2019-03-30 PROCEDURE — 87389 HIV-1 AG W/HIV-1&-2 AB AG IA: CPT

## 2019-03-30 PROCEDURE — 87340 HEPATITIS B SURFACE AG IA: CPT

## 2019-03-30 PROCEDURE — 86803 HEPATITIS C AB TEST: CPT

## 2019-03-30 PROCEDURE — 36415 COLL VENOUS BLD VENIPUNCTURE: CPT

## 2019-03-30 PROCEDURE — 95117 IMMUNOTHERAPY INJECTIONS: CPT | Performed by: ALLERGY & IMMUNOLOGY

## 2019-03-30 PROCEDURE — 86780 TREPONEMA PALLIDUM: CPT

## 2019-04-03 ENCOUNTER — APPOINTMENT (OUTPATIENT)
Dept: LAB | Facility: HOSPITAL | Age: 39
End: 2019-04-03
Attending: OBSTETRICS & GYNECOLOGY
Payer: COMMERCIAL

## 2019-04-03 DIAGNOSIS — O02.1 MISSED ABORTION: ICD-10-CM

## 2019-04-03 PROCEDURE — 36415 COLL VENOUS BLD VENIPUNCTURE: CPT

## 2019-04-03 PROCEDURE — 84702 CHORIONIC GONADOTROPIN TEST: CPT

## 2019-04-04 ENCOUNTER — TELEPHONE (OUTPATIENT)
Dept: OBGYN CLINIC | Facility: CLINIC | Age: 39
End: 2019-04-04

## 2019-04-04 DIAGNOSIS — O03.9 MISCARRIAGE: Primary | ICD-10-CM

## 2019-04-04 NOTE — TELEPHONE ENCOUNTER
Informed pt of message below. Pt voices understanding.      ----- Message from Sandra Burgos MD sent at 4/3/2019  5:40 PM CDT -----  Naye Jaxonsheng is now 8. Patient to repeat Quant BHCG x 1 week. Call patient.

## 2019-04-10 ENCOUNTER — APPOINTMENT (OUTPATIENT)
Dept: LAB | Age: 39
End: 2019-04-10
Attending: OBSTETRICS & GYNECOLOGY
Payer: COMMERCIAL

## 2019-04-10 ENCOUNTER — TELEPHONE (OUTPATIENT)
Dept: OBGYN CLINIC | Facility: CLINIC | Age: 39
End: 2019-04-10

## 2019-04-10 DIAGNOSIS — O03.9 MISCARRIAGE: ICD-10-CM

## 2019-04-10 DIAGNOSIS — D25.9 UTERINE LEIOMYOMA, UNSPECIFIED LOCATION: Primary | ICD-10-CM

## 2019-04-10 PROCEDURE — 84702 CHORIONIC GONADOTROPIN TEST: CPT

## 2019-04-10 PROCEDURE — 36415 COLL VENOUS BLD VENIPUNCTURE: CPT

## 2019-04-10 NOTE — TELEPHONE ENCOUNTER
Please schedule the following surgery:    Procedure:  Exploratory Laparotomy, Multiple Myomectomies  Assist: (Y/N or none)  Yes  Date: Wednesday, 5/13/2019  Dx: Symptomatic Fibroid Uterus. Pre-op appt: (Y/N or n/a) Already Done.   Admission: (AM/PM)  AM

## 2019-04-12 NOTE — TELEPHONE ENCOUNTER
Pt scheduled 5/22 @ 7:30. Informed of sx details and labs placed prior to sx. Pt Understood and verbalized agreement of all.     -prior auth pending    Dr. Donna Joy, would you be able to assist Dr. Tay Wells?

## 2019-04-22 NOTE — DISCHARGE SUMMARY
[facility]    PATIENT'S NAME: Valencia Katz   ATTENDING PHYSICIAN: Aiden Lane MD   PATIENT ACCOUNT#:   [de-identified]    LOCATION:  [address] [place] [address] RECORD #:   [id number]       YOB: 1980  ADMISSION DATE:       03/19/

## 2019-04-22 NOTE — DISCHARGE SUMMARY
Arlette Lopez is a 41year old female who is being observed in the hospital due to known ectopic pregnancy s/p methotrexate tx 3/6/19,  Pt has had 2 u/s this admission and stable condition.   Pt stated that since admission, her pain is now markedly improv Social History    Tobacco Use      Smoking status: Never Smoker      Smokeless tobacco: Never Used      Tobacco comment: No household smokers. Alcohol use:  Yes      Alcohol/week: 0.5 oz      Types: 1 Glasses of wine per week      Comment: Rarely    D home tonight, with prn norco if needed, and would then f/u in office tomorrow morning. Pt to call and go to er for severe pain/bleeding/any other problem.

## 2019-04-23 NOTE — TELEPHONE ENCOUNTER
I think my note from 3/28/2019 in addition to the U/S report and E-mail should be enough. If not, let me know and I can dictate an H & P for her surgery.

## 2019-04-23 NOTE — TELEPHONE ENCOUNTER
Dr. Timmy Sebastian, we need to fax clinicals to insurance for sx. Since most of the clinical documentation is via e-maisl and telephone calls. Is there another H&P we can submit to insurance other than you office note from 3/28/19 and the U/S from 3/20/19 .   Ple

## 2019-04-25 ENCOUNTER — TELEPHONE (OUTPATIENT)
Dept: OBGYN CLINIC | Facility: CLINIC | Age: 39
End: 2019-04-25

## 2019-04-25 NOTE — TELEPHONE ENCOUNTER
Pt dropped off fmla forms to be completed. Pt will pay at time of . Pt will  at [facility] once completed.

## 2019-04-26 NOTE — TELEPHONE ENCOUNTER
LM, patient called to make sure we rec'd forms and she needs by May 7th. Stated yes, and we will try our best to meet the deadline.   SC

## 2019-04-26 NOTE — TELEPHONE ENCOUNTER
FMLA form for Dr. Beverley Pfeiffer received in Forms dept+ FCR+ Signed release. Logged for processing.  NK

## 2019-04-29 ENCOUNTER — NURSE ONLY (OUTPATIENT)
Dept: ALLERGY | Facility: CLINIC | Age: 39
End: 2019-04-29
Payer: COMMERCIAL

## 2019-04-29 DIAGNOSIS — J30.89 ENVIRONMENTAL AND SEASONAL ALLERGIES: ICD-10-CM

## 2019-04-29 PROCEDURE — 95117 IMMUNOTHERAPY INJECTIONS: CPT | Performed by: ALLERGY & IMMUNOLOGY

## 2019-04-29 PROCEDURE — 95165 ANTIGEN THERAPY SERVICES: CPT | Performed by: ALLERGY & IMMUNOLOGY

## 2019-04-29 NOTE — TELEPHONE ENCOUNTER
Spoke to a senior rep at [employer] due to a number of transfers. They denied our sx request.  Requested a fax/copy of denial for provider to review denial reasoning. Rep provided us with a number to schedule a peer to peer.  P: [phone]    Rout

## 2019-04-29 NOTE — TELEPHONE ENCOUNTER
Scheduled a peer to peer with Dr. Lisa Greco and Dr. John Mejia for [facility] May 1st for case reference number, [id number]. Dr. John Mejia, they will call between 11am-1pm. You are on call this day. Provided them with x [phone]. Hope this works with your schedule.   Ple

## 2019-05-01 NOTE — TELEPHONE ENCOUNTER
Please inform patient that her Insurance company is refusing to approve her surgery. They say that Uterine Artery Embolization is a more conservative option that should be offered first even though patient desires to maintain her fertility.   They recommen

## 2019-05-01 NOTE — TELEPHONE ENCOUNTER
Corin Lee RN contacted Dr. Demond Branham office and they requested patients clinicals to review if MRI is needed before consult. Will contact pt once obtained decision for her to proceed with scheduling an appt with their office.

## 2019-05-02 ENCOUNTER — TELEPHONE (OUTPATIENT)
Dept: OBGYN CLINIC | Facility: CLINIC | Age: 39
End: 2019-05-02

## 2019-05-02 NOTE — TELEPHONE ENCOUNTER
Chuy Sykes from Dr. Soto [place] office states pt does NOT need an MRI prior to seeing Dr. Shane Orellana  Wants to know if it is okay to call pt and schedule appt [phone]

## 2019-05-02 NOTE — TELEPHONE ENCOUNTER
Me           5/2/19 10:25 AM   Note      Yes. Pt is expecting a call to be scheduled. Sx is currently scheduled for 5/22. Would like first appt available.                          Urbano Fuller           5/2/19 9:53 AM   Note      Tasha from Dr. Thi Gomez

## 2019-05-02 NOTE — TELEPHONE ENCOUNTER
Yes. Pt is expecting a call to be scheduled. Sx is currently scheduled for 5/22. Would like first appt available.

## 2019-05-02 NOTE — TELEPHONE ENCOUNTER
Pt informed of sx denial current status and will proceed with MLMs recommendation to see Dr. Celia Faust. Called Dr. Demond Branham office to contact pt for appt. Donnie San called back and confirm pt has appt Monday 5/6 to see Dr. Celia Faust.

## 2019-05-06 DIAGNOSIS — I73.9 PAD (PERIPHERAL ARTERY DISEASE) (HCC): Primary | ICD-10-CM

## 2019-05-07 NOTE — TELEPHONE ENCOUNTER
Received Dr. Fabricio Fuentes office note via fax. Placed on [facility]'s desk for review. Contacted [employer] to F/U on appeals process. Was advised to fax IR note and any other note pertaining to this case.     Dr. Tay Wells, will the IR note suffice or would

## 2019-05-07 NOTE — TELEPHONE ENCOUNTER
Dr. Emil Jaquez,    Please sign off on form:  -Highlight the patient and hit \"Chart\" button. -In Chart Review, w/in the Encounter tab - click 1 time on the Telephone call encounter for 4/25/19. Scroll down the telephone encounter.  -Click \"scan on\" blue Hyperlink under \"Media\" heading for FMLA Dr. Emil Jaquez 5/7/19 w/in the telephone enc.  -Click on Acknowledge button at the bottom right corner and left-click onto image, signature stamp appears and drag signature to Provider signature line. Stamp will turn blue. Close window.     Thank you,  [facility]

## 2019-05-07 NOTE — TELEPHONE ENCOUNTER
Per MLM, IR office note will suffice. Faxed to INS appeals process. Pt contacted to update. No answer.

## 2019-05-09 ENCOUNTER — PATIENT MESSAGE (OUTPATIENT)
Dept: OBGYN CLINIC | Facility: CLINIC | Age: 39
End: 2019-05-09

## 2019-05-09 NOTE — TELEPHONE ENCOUNTER
From: Arlette Collins  To: Himanshu Adam MD  Sent: 5/9/2019 12:21 PM CDT  Subject: Visit Follow-up Question    Hi Dr Renata Chavez,    I wanted to ask this question because I little concerned.  My period started yesterday, and although it was the first day

## 2019-05-09 NOTE — PROGRESS NOTES
Pt informed on ML's recommendation. Pt verbalized understanding. States that she does not want to go to ED. Advised that due to her symptoms it would be best for her to be evaluated, especially if she continues to bleed heavily.  Pt states she will decide

## 2019-05-09 NOTE — PROGRESS NOTES
Pt is currently bleeding and is changing her pad every hour since this morning. Pt states that she is feeling dizzy, shaky and is upset as she currently got off the phone with her insurance in regards to her surgery.  States she was advised that the letter

## 2019-05-13 NOTE — TELEPHONE ENCOUNTER
Dr. Sharon Gray, finally was able to reach someone in the appeals department. Rachid Puri, a rep at [facility], informed us that pts appeal letter is under PDR, provider dispute resolution.  We marked the fax STAT due to sx date approaching and that request was parul

## 2019-05-17 NOTE — TELEPHONE ENCOUNTER
Pt informed of new sx date. She Understood and verbalized agreement.  -assist pending    Pt states she is still in pain and will like a request for pain meds. She will call and F/U on her request.     Routing to [facility] and RN's.

## 2019-05-22 RX ORDER — PSEUDOEPHEDRINE HCL 120 MG/1
TABLET, FILM COATED, EXTENDED RELEASE ORAL
Qty: 60 TABLET | OUTPATIENT
Start: 2019-05-22

## 2019-06-03 ENCOUNTER — PATIENT MESSAGE (OUTPATIENT)
Dept: OBGYN CLINIC | Facility: CLINIC | Age: 39
End: 2019-06-03

## 2019-06-04 ENCOUNTER — TELEPHONE (OUTPATIENT)
Dept: OBGYN CLINIC | Facility: CLINIC | Age: 39
End: 2019-06-04

## 2019-06-04 RX ORDER — HYDROCODONE BITARTRATE AND ACETAMINOPHEN 5; 325 MG/1; MG/1
1 TABLET ORAL EVERY 6 HOURS PRN
Qty: 20 TABLET | Refills: 0 | OUTPATIENT
Start: 2019-06-04 | End: 2019-06-04 | Stop reason: CLARIF

## 2019-06-04 RX ORDER — HYDROCODONE BITARTRATE AND ACETAMINOPHEN 5; 325 MG/1; MG/1
1 TABLET ORAL EVERY 6 HOURS PRN
Qty: 20 TABLET | Refills: 0 | Status: ON HOLD | OUTPATIENT
Start: 2019-06-04 | End: 2019-07-26

## 2019-06-04 NOTE — TELEPHONE ENCOUNTER
Regarding: RE: Other  Contact: [phone]  ----- Message from Tamara Almanza RN sent at 6/4/2019 12:44 PM CDT -----       ----- Message from Arlette Mckeon to Melissa Bentley MD sent at 6/4/2019 12:22 PM -----   okay then let's do the Narco

## 2019-06-04 NOTE — TELEPHONE ENCOUNTER
From: April KHADIJAH Collins  Sent: 6/4/2019 12:22 PM CDT  To: Queta Bassett Ob/Gyne Clinical Staff  Subject: RE: Other    okay then let's do the Narco then.  Doesn't make sense to go to the ER for my known issue that we can't doing anything about until the surgery is a

## 2019-06-10 ENCOUNTER — MED REC SCAN ONLY (OUTPATIENT)
Dept: OBGYN CLINIC | Facility: CLINIC | Age: 39
End: 2019-06-10

## 2019-06-10 ENCOUNTER — NURSE ONLY (OUTPATIENT)
Dept: ALLERGY | Facility: CLINIC | Age: 39
End: 2019-06-10
Payer: COMMERCIAL

## 2019-06-10 DIAGNOSIS — J30.89 ENVIRONMENTAL AND SEASONAL ALLERGIES: ICD-10-CM

## 2019-06-10 PROCEDURE — 95117 IMMUNOTHERAPY INJECTIONS: CPT | Performed by: ALLERGY & IMMUNOLOGY

## 2019-06-10 NOTE — TELEPHONE ENCOUNTER
Received letter from pts insurance approving surgery for 1 Day in patient. C# [id number]. Entered in referrals.     *Coverage is subject to eligibility and what your plan says at the time you get services\"    Letter states if pt has questions about authorizati

## 2019-06-17 ENCOUNTER — NURSE ONLY (OUTPATIENT)
Dept: ALLERGY | Facility: CLINIC | Age: 39
End: 2019-06-17
Payer: COMMERCIAL

## 2019-06-17 DIAGNOSIS — J30.89 ENVIRONMENTAL AND SEASONAL ALLERGIES: ICD-10-CM

## 2019-06-17 PROCEDURE — 95117 IMMUNOTHERAPY INJECTIONS: CPT | Performed by: ALLERGY & IMMUNOLOGY

## 2019-06-19 ENCOUNTER — TELEPHONE (OUTPATIENT)
Dept: OBGYN CLINIC | Facility: CLINIC | Age: 39
End: 2019-06-19

## 2019-06-21 NOTE — TELEPHONE ENCOUNTER
[employer] Disability form for Dr. Baptiste Shadow received in Forms dept. Logged for processing.  NK

## 2019-07-01 ENCOUNTER — NURSE ONLY (OUTPATIENT)
Dept: ALLERGY | Facility: CLINIC | Age: 39
End: 2019-07-01
Payer: COMMERCIAL

## 2019-07-01 DIAGNOSIS — J30.89 ENVIRONMENTAL AND SEASONAL ALLERGIES: ICD-10-CM

## 2019-07-01 PROCEDURE — 95117 IMMUNOTHERAPY INJECTIONS: CPT | Performed by: ALLERGY & IMMUNOLOGY

## 2019-07-01 NOTE — TELEPHONE ENCOUNTER
Dr. Karmen Malin,    Please sign off on form: Disability for exploratory laparotomy, myomectomy ; 4-6 wks recovery    -Highlight the patient and hit \"Chart\" button.   -In Chart Review, w/in the Encounter tab - click 1 time on the Telephone call encounter for

## 2019-07-03 NOTE — TELEPHONE ENCOUNTER
Dr. Annabelle Ellsworth,    Pt having surger on 7/24/19 with Adela Fontanez but pt needs forms today    Please sign off on form: Disability for exploratory laparotomy, myomectomy; 4-6 weeks recovery    -Highlight the patient and hit \"Chart\" button.   -In Chart Review,

## 2019-07-04 ENCOUNTER — PATIENT MESSAGE (OUTPATIENT)
Dept: OBGYN CLINIC | Facility: CLINIC | Age: 39
End: 2019-07-04

## 2019-07-05 NOTE — TELEPHONE ENCOUNTER
See patient's MyChart message to Dr. Yany Nuñez. Please bring forms and inform Dr Irving Frankel who is in office today.

## 2019-07-05 NOTE — PROGRESS NOTES
Spoke with pt and she states that the received a call informing her that paperwork was signed by another provider. Advised that I would contact someone from forms dept to verify. Verbalized understanding.      Talked to Papo Jacob from forms dept to verify th

## 2019-07-05 NOTE — TELEPHONE ENCOUNTER
Jen Caballero, RN 7/5/2019 7:25 AM CDT        ----- Message -----  From: Arlette Collins  Sent: 7/4/2019 9:59 PM  To: Cynthia Healy Ob/Gyne Clinical Staff  Subject: Other     Hello,    I spoke to the forms department yesterday and they told me that no one is a

## 2019-07-15 ENCOUNTER — NURSE ONLY (OUTPATIENT)
Dept: ALLERGY | Facility: CLINIC | Age: 39
End: 2019-07-15
Payer: COMMERCIAL

## 2019-07-15 DIAGNOSIS — J30.89 ENVIRONMENTAL AND SEASONAL ALLERGIES: ICD-10-CM

## 2019-07-15 PROCEDURE — 95117 IMMUNOTHERAPY INJECTIONS: CPT | Performed by: ALLERGY & IMMUNOLOGY

## 2019-07-17 ENCOUNTER — PATIENT MESSAGE (OUTPATIENT)
Dept: OBGYN CLINIC | Facility: CLINIC | Age: 39
End: 2019-07-17

## 2019-07-17 DIAGNOSIS — D25.9 UTERINE LEIOMYOMA, UNSPECIFIED LOCATION: Primary | ICD-10-CM

## 2019-07-17 NOTE — H&P
[facility]    PATIENT'S NAME: Amee Treadwell   ATTENDING PHYSICIAN: Tk Gray MD   PATIENT ACCOUNT#:   [id number]    LOCATION:    MEDICAL RECORD #:   [id number]       YOB: 1980  ADMISSION DATE:       07/24/2019    HISTORY Supple without thyromegaly. LUNGS:  Clear to auscultation. HEART:  Regular rate and rhythm with a normal S1 and S2 without murmur. ABDOMEN:  No hepatosplenomegaly. PELVIC:  Normal external genitalia.   Patient had a normal Pap smear in March of this y

## 2019-07-18 RX ORDER — LAMOTRIGINE 25 MG/1
25 TABLET ORAL NIGHTLY
COMMUNITY
End: 2021-05-04

## 2019-07-18 NOTE — TELEPHONE ENCOUNTER
From: Arlette Collins  To: Deepti Luna MD  Sent: 7/17/2019 3:44 PM CDT  Subject: Other    Heljustine Nuñez,     I know that I may get a call but I want to double check on prep for surgery.  I didn't know if I needed any additional tests or ultrasoun

## 2019-07-18 NOTE — PROGRESS NOTES
Pre-op surgery labs were placed in the system for pt to complete prior to procedure. Pt Name and  verified. Pt informed and verbalized understanding.

## 2019-07-22 ENCOUNTER — LAB ENCOUNTER (OUTPATIENT)
Dept: LAB | Facility: HOSPITAL | Age: 39
End: 2019-07-22
Attending: OBSTETRICS & GYNECOLOGY
Payer: COMMERCIAL

## 2019-07-22 DIAGNOSIS — D25.9 UTERINE LEIOMYOMA, UNSPECIFIED LOCATION: ICD-10-CM

## 2019-07-22 LAB
ANTIBODY SCREEN: NEGATIVE
BASOPHILS # BLD AUTO: 0.05 X10(3) UL (ref 0–0.2)
BASOPHILS NFR BLD AUTO: 0.7 %
DEPRECATED RDW RBC AUTO: 40.7 FL (ref 35.1–46.3)
EOSINOPHIL # BLD AUTO: 0.16 X10(3) UL (ref 0–0.7)
EOSINOPHIL NFR BLD AUTO: 2.3 %
ERYTHROCYTE [DISTWIDTH] IN BLOOD BY AUTOMATED COUNT: 12.9 % (ref 11–15)
HCT VFR BLD AUTO: 37.8 % (ref 35–48)
HGB BLD-MCNC: 12.1 G/DL (ref 12–16)
IMM GRANULOCYTES # BLD AUTO: 0.01 X10(3) UL (ref 0–1)
IMM GRANULOCYTES NFR BLD: 0.1 %
LYMPHOCYTES # BLD AUTO: 3.27 X10(3) UL (ref 1–4)
LYMPHOCYTES NFR BLD AUTO: 47.7 %
MCH RBC QN AUTO: 27.7 PG (ref 26–34)
MCHC RBC AUTO-ENTMCNC: 32 G/DL (ref 31–37)
MCV RBC AUTO: 86.5 FL (ref 80–100)
MONOCYTES # BLD AUTO: 0.61 X10(3) UL (ref 0.1–1)
MONOCYTES NFR BLD AUTO: 8.9 %
NEUTROPHILS # BLD AUTO: 2.76 X10 (3) UL (ref 1.5–7.7)
NEUTROPHILS # BLD AUTO: 2.76 X10(3) UL (ref 1.5–7.7)
NEUTROPHILS NFR BLD AUTO: 40.3 %
PLATELET # BLD AUTO: 341 10(3)UL (ref 150–450)
RBC # BLD AUTO: 4.37 X10(6)UL (ref 3.8–5.3)
RH BLOOD TYPE: POSITIVE
WBC # BLD AUTO: 6.9 X10(3) UL (ref 4–11)

## 2019-07-22 PROCEDURE — 85025 COMPLETE CBC W/AUTO DIFF WBC: CPT

## 2019-07-22 PROCEDURE — 36415 COLL VENOUS BLD VENIPUNCTURE: CPT

## 2019-07-22 PROCEDURE — 86850 RBC ANTIBODY SCREEN: CPT

## 2019-07-22 PROCEDURE — 86900 BLOOD TYPING SEROLOGIC ABO: CPT

## 2019-07-22 PROCEDURE — 86901 BLOOD TYPING SEROLOGIC RH(D): CPT

## 2019-07-24 ENCOUNTER — ANESTHESIA EVENT (OUTPATIENT)
Dept: SURGERY | Facility: HOSPITAL | Age: 39
DRG: 743 | End: 2019-07-24
Payer: COMMERCIAL

## 2019-07-24 ENCOUNTER — HOSPITAL ENCOUNTER (INPATIENT)
Facility: HOSPITAL | Age: 39
LOS: 2 days | Discharge: HOME | DRG: 743 | End: 2019-07-26
Attending: OBSTETRICS & GYNECOLOGY | Admitting: OBSTETRICS & GYNECOLOGY
Payer: COMMERCIAL

## 2019-07-24 ENCOUNTER — ANESTHESIA (OUTPATIENT)
Dept: SURGERY | Facility: HOSPITAL | Age: 39
DRG: 743 | End: 2019-07-24
Payer: COMMERCIAL

## 2019-07-24 DIAGNOSIS — D25.9 UTERINE LEIOMYOMA, UNSPECIFIED LOCATION: ICD-10-CM

## 2019-07-24 LAB — B-HCG UR QL: NEGATIVE

## 2019-07-24 PROCEDURE — 0UB90ZZ EXCISION OF UTERUS, OPEN APPROACH: ICD-10-PCS | Performed by: OBSTETRICS & GYNECOLOGY

## 2019-07-24 PROCEDURE — 58146 MYOMECTOMY ABDOM COMPLEX: CPT | Performed by: OBSTETRICS & GYNECOLOGY

## 2019-07-24 DEVICE — INTERCEED: Type: IMPLANTABLE DEVICE | Site: ABDOMEN | Status: FUNCTIONAL

## 2019-07-24 RX ORDER — NALOXONE HYDROCHLORIDE 0.4 MG/ML
0.08 INJECTION, SOLUTION INTRAMUSCULAR; INTRAVENOUS; SUBCUTANEOUS
Status: DISCONTINUED | OUTPATIENT
Start: 2019-07-24 | End: 2019-07-26

## 2019-07-24 RX ORDER — HYDROMORPHONE HYDROCHLORIDE 1 MG/ML
0.2 INJECTION, SOLUTION INTRAMUSCULAR; INTRAVENOUS; SUBCUTANEOUS EVERY 5 MIN PRN
Status: DISCONTINUED | OUTPATIENT
Start: 2019-07-24 | End: 2019-07-24 | Stop reason: HOSPADM

## 2019-07-24 RX ORDER — METOCLOPRAMIDE 10 MG/1
10 TABLET ORAL ONCE
Status: COMPLETED | OUTPATIENT
Start: 2019-07-24 | End: 2019-07-24

## 2019-07-24 RX ORDER — CEFAZOLIN SODIUM/WATER 2 G/20 ML
SYRINGE (ML) INTRAVENOUS AS NEEDED
Status: DISCONTINUED | OUTPATIENT
Start: 2019-07-24 | End: 2019-07-24 | Stop reason: SURG

## 2019-07-24 RX ORDER — BUPIVACAINE HYDROCHLORIDE AND EPINEPHRINE 2.5; 5 MG/ML; UG/ML
INJECTION, SOLUTION INFILTRATION; PERINEURAL AS NEEDED
Status: DISCONTINUED | OUTPATIENT
Start: 2019-07-24 | End: 2019-07-24 | Stop reason: HOSPADM

## 2019-07-24 RX ORDER — FAMOTIDINE 20 MG/1
20 TABLET ORAL 2 TIMES DAILY
Status: DISCONTINUED | OUTPATIENT
Start: 2019-07-24 | End: 2019-07-26

## 2019-07-24 RX ORDER — LAMOTRIGINE 25 MG/1
25 TABLET ORAL NIGHTLY
Status: DISCONTINUED | OUTPATIENT
Start: 2019-07-24 | End: 2019-07-26

## 2019-07-24 RX ORDER — ONDANSETRON 2 MG/ML
4 INJECTION INTRAMUSCULAR; INTRAVENOUS ONCE AS NEEDED
Status: DISCONTINUED | OUTPATIENT
Start: 2019-07-24 | End: 2019-07-24 | Stop reason: HOSPADM

## 2019-07-24 RX ORDER — HYDROCODONE BITARTRATE AND ACETAMINOPHEN 7.5; 325 MG/1; MG/1
1 TABLET ORAL EVERY 6 HOURS PRN
Status: DISCONTINUED | OUTPATIENT
Start: 2019-07-25 | End: 2019-07-25

## 2019-07-24 RX ORDER — HYDROCODONE BITARTRATE AND ACETAMINOPHEN 5; 325 MG/1; MG/1
1 TABLET ORAL AS NEEDED
Status: DISCONTINUED | OUTPATIENT
Start: 2019-07-24 | End: 2019-07-24 | Stop reason: HOSPADM

## 2019-07-24 RX ORDER — NALBUPHINE HCL 10 MG/ML
2.5 AMPUL (ML) INJECTION EVERY 4 HOURS PRN
Status: DISCONTINUED | OUTPATIENT
Start: 2019-07-24 | End: 2019-07-26

## 2019-07-24 RX ORDER — ACETAMINOPHEN 500 MG
1000 TABLET ORAL ONCE
Status: COMPLETED | OUTPATIENT
Start: 2019-07-24 | End: 2019-07-24

## 2019-07-24 RX ORDER — LIDOCAINE HYDROCHLORIDE 10 MG/ML
INJECTION, SOLUTION EPIDURAL; INFILTRATION; INTRACAUDAL; PERINEURAL AS NEEDED
Status: DISCONTINUED | OUTPATIENT
Start: 2019-07-24 | End: 2019-07-24 | Stop reason: SURG

## 2019-07-24 RX ORDER — PROCHLORPERAZINE EDISYLATE 5 MG/ML
5 INJECTION INTRAMUSCULAR; INTRAVENOUS ONCE AS NEEDED
Status: DISCONTINUED | OUTPATIENT
Start: 2019-07-24 | End: 2019-07-24 | Stop reason: HOSPADM

## 2019-07-24 RX ORDER — MORPHINE SULFATE 4 MG/ML
2 INJECTION, SOLUTION INTRAMUSCULAR; INTRAVENOUS EVERY 10 MIN PRN
Status: DISCONTINUED | OUTPATIENT
Start: 2019-07-24 | End: 2019-07-24 | Stop reason: HOSPADM

## 2019-07-24 RX ORDER — DEXTROSE, SODIUM CHLORIDE, SODIUM LACTATE, POTASSIUM CHLORIDE, AND CALCIUM CHLORIDE 5; .6; .31; .03; .02 G/100ML; G/100ML; G/100ML; G/100ML; G/100ML
INJECTION, SOLUTION INTRAVENOUS CONTINUOUS
Status: DISCONTINUED | OUTPATIENT
Start: 2019-07-24 | End: 2019-07-26

## 2019-07-24 RX ORDER — FAMOTIDINE 20 MG/1
20 TABLET ORAL ONCE
Status: COMPLETED | OUTPATIENT
Start: 2019-07-24 | End: 2019-07-24

## 2019-07-24 RX ORDER — IBUPROFEN 600 MG/1
600 TABLET ORAL EVERY 6 HOURS PRN
Status: DISCONTINUED | OUTPATIENT
Start: 2019-07-25 | End: 2019-07-26

## 2019-07-24 RX ORDER — ONDANSETRON 2 MG/ML
4 INJECTION INTRAMUSCULAR; INTRAVENOUS EVERY 6 HOURS PRN
Status: DISCONTINUED | OUTPATIENT
Start: 2019-07-24 | End: 2019-07-26

## 2019-07-24 RX ORDER — HYDROMORPHONE HYDROCHLORIDE 1 MG/ML
0.6 INJECTION, SOLUTION INTRAMUSCULAR; INTRAVENOUS; SUBCUTANEOUS EVERY 5 MIN PRN
Status: DISCONTINUED | OUTPATIENT
Start: 2019-07-24 | End: 2019-07-24 | Stop reason: HOSPADM

## 2019-07-24 RX ORDER — FLUTICASONE PROPIONATE 50 MCG
2 SPRAY, SUSPENSION (ML) NASAL DAILY
Status: DISCONTINUED | OUTPATIENT
Start: 2019-07-24 | End: 2019-07-26

## 2019-07-24 RX ORDER — ENOXAPARIN SODIUM 100 MG/ML
40 INJECTION SUBCUTANEOUS DAILY
Status: DISCONTINUED | OUTPATIENT
Start: 2019-07-24 | End: 2019-07-26

## 2019-07-24 RX ORDER — DIPHENHYDRAMINE HYDROCHLORIDE 50 MG/ML
12.5 INJECTION INTRAMUSCULAR; INTRAVENOUS EVERY 4 HOURS PRN
Status: DISCONTINUED | OUTPATIENT
Start: 2019-07-24 | End: 2019-07-26

## 2019-07-24 RX ORDER — MORPHINE SULFATE 4 MG/ML
4 INJECTION, SOLUTION INTRAMUSCULAR; INTRAVENOUS EVERY 10 MIN PRN
Status: DISCONTINUED | OUTPATIENT
Start: 2019-07-24 | End: 2019-07-24 | Stop reason: HOSPADM

## 2019-07-24 RX ORDER — HYDROCODONE BITARTRATE AND ACETAMINOPHEN 5; 325 MG/1; MG/1
2 TABLET ORAL AS NEEDED
Status: DISCONTINUED | OUTPATIENT
Start: 2019-07-24 | End: 2019-07-24 | Stop reason: HOSPADM

## 2019-07-24 RX ORDER — LEVOCETIRIZINE DIHYDROCHLORIDE 5 MG/1
5 TABLET, FILM COATED ORAL EVERY EVENING
Status: DISCONTINUED | OUTPATIENT
Start: 2019-07-24 | End: 2019-07-24 | Stop reason: RX

## 2019-07-24 RX ORDER — MONTELUKAST SODIUM 10 MG/1
10 TABLET ORAL NIGHTLY
Status: DISCONTINUED | OUTPATIENT
Start: 2019-07-24 | End: 2019-07-26

## 2019-07-24 RX ORDER — NALOXONE HYDROCHLORIDE 0.4 MG/ML
80 INJECTION, SOLUTION INTRAMUSCULAR; INTRAVENOUS; SUBCUTANEOUS AS NEEDED
Status: DISCONTINUED | OUTPATIENT
Start: 2019-07-24 | End: 2019-07-24 | Stop reason: HOSPADM

## 2019-07-24 RX ORDER — ONDANSETRON 2 MG/ML
INJECTION INTRAMUSCULAR; INTRAVENOUS AS NEEDED
Status: DISCONTINUED | OUTPATIENT
Start: 2019-07-24 | End: 2019-07-24 | Stop reason: SURG

## 2019-07-24 RX ORDER — SODIUM CHLORIDE 0.9 % (FLUSH) 0.9 %
10 SYRINGE (ML) INJECTION AS NEEDED
Status: DISCONTINUED | OUTPATIENT
Start: 2019-07-24 | End: 2019-07-26

## 2019-07-24 RX ORDER — PHENYLEPHRINE HCL 10 MG/ML
VIAL (ML) INJECTION AS NEEDED
Status: DISCONTINUED | OUTPATIENT
Start: 2019-07-24 | End: 2019-07-24 | Stop reason: SURG

## 2019-07-24 RX ORDER — CETIRIZINE HYDROCHLORIDE 10 MG/1
10 TABLET ORAL DAILY
Status: DISCONTINUED | OUTPATIENT
Start: 2019-07-25 | End: 2019-07-26

## 2019-07-24 RX ORDER — SODIUM CHLORIDE, SODIUM LACTATE, POTASSIUM CHLORIDE, CALCIUM CHLORIDE 600; 310; 30; 20 MG/100ML; MG/100ML; MG/100ML; MG/100ML
INJECTION, SOLUTION INTRAVENOUS CONTINUOUS
Status: DISCONTINUED | OUTPATIENT
Start: 2019-07-24 | End: 2019-07-24 | Stop reason: HOSPADM

## 2019-07-24 RX ORDER — SODIUM CHLORIDE, SODIUM LACTATE, POTASSIUM CHLORIDE, CALCIUM CHLORIDE 600; 310; 30; 20 MG/100ML; MG/100ML; MG/100ML; MG/100ML
INJECTION, SOLUTION INTRAVENOUS CONTINUOUS
Status: DISCONTINUED | OUTPATIENT
Start: 2019-07-24 | End: 2019-07-26

## 2019-07-24 RX ORDER — NEOSTIGMINE METHYLSULFATE 0.5 MG/ML
INJECTION INTRAVENOUS AS NEEDED
Status: DISCONTINUED | OUTPATIENT
Start: 2019-07-24 | End: 2019-07-24 | Stop reason: SURG

## 2019-07-24 RX ORDER — GLYCOPYRROLATE 0.2 MG/ML
INJECTION INTRAMUSCULAR; INTRAVENOUS AS NEEDED
Status: DISCONTINUED | OUTPATIENT
Start: 2019-07-24 | End: 2019-07-24 | Stop reason: SURG

## 2019-07-24 RX ORDER — MORPHINE SULFATE 10 MG/ML
6 INJECTION, SOLUTION INTRAMUSCULAR; INTRAVENOUS EVERY 10 MIN PRN
Status: DISCONTINUED | OUTPATIENT
Start: 2019-07-24 | End: 2019-07-24 | Stop reason: HOSPADM

## 2019-07-24 RX ORDER — ROCURONIUM BROMIDE 10 MG/ML
INJECTION, SOLUTION INTRAVENOUS AS NEEDED
Status: DISCONTINUED | OUTPATIENT
Start: 2019-07-24 | End: 2019-07-24 | Stop reason: SURG

## 2019-07-24 RX ORDER — HYDROMORPHONE HYDROCHLORIDE 1 MG/ML
0.4 INJECTION, SOLUTION INTRAMUSCULAR; INTRAVENOUS; SUBCUTANEOUS EVERY 5 MIN PRN
Status: DISCONTINUED | OUTPATIENT
Start: 2019-07-24 | End: 2019-07-24 | Stop reason: HOSPADM

## 2019-07-24 RX ORDER — HALOPERIDOL 5 MG/ML
0.25 INJECTION INTRAMUSCULAR ONCE AS NEEDED
Status: DISCONTINUED | OUTPATIENT
Start: 2019-07-24 | End: 2019-07-24 | Stop reason: HOSPADM

## 2019-07-24 RX ORDER — DEXAMETHASONE SODIUM PHOSPHATE 4 MG/ML
VIAL (ML) INJECTION AS NEEDED
Status: DISCONTINUED | OUTPATIENT
Start: 2019-07-24 | End: 2019-07-24 | Stop reason: SURG

## 2019-07-24 RX ORDER — MIDAZOLAM HYDROCHLORIDE 1 MG/ML
INJECTION INTRAMUSCULAR; INTRAVENOUS AS NEEDED
Status: DISCONTINUED | OUTPATIENT
Start: 2019-07-24 | End: 2019-07-24 | Stop reason: SURG

## 2019-07-24 RX ADMIN — ONDANSETRON 4 MG: 2 INJECTION INTRAMUSCULAR; INTRAVENOUS at 08:38:00

## 2019-07-24 RX ADMIN — GLYCOPYRROLATE 0.6 MG: 0.2 INJECTION INTRAMUSCULAR; INTRAVENOUS at 09:36:00

## 2019-07-24 RX ADMIN — NEOSTIGMINE METHYLSULFATE 3 MG: 0.5 INJECTION INTRAVENOUS at 09:36:00

## 2019-07-24 RX ADMIN — LIDOCAINE HYDROCHLORIDE 80 MG: 10 INJECTION, SOLUTION EPIDURAL; INFILTRATION; INTRACAUDAL; PERINEURAL at 07:32:00

## 2019-07-24 RX ADMIN — MIDAZOLAM HYDROCHLORIDE 2 MG: 1 INJECTION INTRAMUSCULAR; INTRAVENOUS at 07:29:00

## 2019-07-24 RX ADMIN — CEFAZOLIN SODIUM/WATER 2 G: 2 G/20 ML SYRINGE (ML) INTRAVENOUS at 07:44:00

## 2019-07-24 RX ADMIN — SODIUM CHLORIDE, SODIUM LACTATE, POTASSIUM CHLORIDE, CALCIUM CHLORIDE: 600; 310; 30; 20 INJECTION, SOLUTION INTRAVENOUS at 09:00:00

## 2019-07-24 RX ADMIN — PHENYLEPHRINE HCL 100 MCG: 10 MG/ML VIAL (ML) INJECTION at 09:03:00

## 2019-07-24 RX ADMIN — DEXAMETHASONE SODIUM PHOSPHATE 8 MG: 4 MG/ML VIAL (ML) INJECTION at 07:50:00

## 2019-07-24 RX ADMIN — ROCURONIUM BROMIDE 50 MG: 10 INJECTION, SOLUTION INTRAVENOUS at 07:37:00

## 2019-07-24 NOTE — ANESTHESIA PROCEDURE NOTES
Airway  Urgency: elective      General Information and Staff    Patient location during procedure: OR  Anesthesiologist: Bea Crowe MD  Resident/CRNA: Mary Ann Mccoy CRNA  Performed: CRNA and anesthesiologist     Indications and Patient Condition

## 2019-07-24 NOTE — OPERATIVE REPORT
[facility]    PATIENT'S NAME: Eladia Ortega   ATTENDING PHYSICIAN: Debora Burnett MD   OPERATING PHYSICIAN: Mari Burnett MD   PATIENT ACCOUNT#:   [id number]    LOCATION:  [facility] PACU 1 [facility] 10  MEDICAL RECORD #:   [id number]       D more fibroids through the same incision for a total of 4. Then, we turned our attention to the 4 cm fibroid in the lower uterine segment.   Pitressin again was injected above the fibroid, blanching was noted, and an incision was made on top of the fibroid, postoperative condition. Dictated By Maida Jenkins MD  d: 07/24/2019 10:15:33  t: 07/24/2019 10:35:53  Job [id number]  ML/

## 2019-07-24 NOTE — BRIEF OP NOTE
Pre-Operative Diagnosis: Uterine leiomyoma, unspecified location [D25.9]     Post-Operative Diagnosis: Uterine leiomyoma, unspecified location [D25.9]      Procedure Performed:   Procedure(s):  exploratory laparotomy, multiple myomectomies    Surgeon(s) an

## 2019-07-24 NOTE — INTERVAL H&P NOTE
Pre-op Diagnosis: Uterine leiomyoma, unspecified location [D25.9]    The above referenced H&P was reviewed by Jair Sanchez MD on 7/24/2019, the patient was examined and no significant changes have occurred in the patient's condition since the H&P was

## 2019-07-24 NOTE — PLAN OF CARE
From PACU, dilaudid pca in place, kaye to be discharged in am, zofran given, tolerating clear and small bits of food, repositioned, plan to get up later tonight.     Problem: Patient Centered Care  Goal: Patient preferences are identified and integrated in

## 2019-07-24 NOTE — ANESTHESIA POSTPROCEDURE EVALUATION
Patient: April R Dennis    Procedure Summary     Date:  07/24/19 Room / Location:  [facility] MAIN OR 01 / 300 [facility] MAIN OR    Anesthesia Start:  6335 Anesthesia Stop:  1001    Procedure:  LAPAROTOMY MYOMECTOMY (N/A ) Diagnosis:       Uterine leiomyoma, unspecified l

## 2019-07-24 NOTE — ANESTHESIA PREPROCEDURE EVALUATION
Anesthesia PreOp Note    HPI:     April KHADIJAH Lu is a 41year old female who presents for preoperative consultation requested by: Eddy Crockett MD    Date of Surgery: 7/24/2019    Procedure(s):  LAPAROTOMY MYOMECTOMY  Indication: Uterine leiomyoma, u Tab Take 25 mg by mouth nightly. Disp:  Rfl:  7/23/2019 at 2300   HYDROcodone-acetaminophen 5-325 MG Oral Tab Take 1 tablet by mouth every 6 (six) hours as needed.  Disp: 20 tablet Rfl: 0 7/22/2019   Montelukast Sodium (SINGULAIR) 10 MG Oral Tab Take 1 tabl file        Non-medical: Not on file    Tobacco Use      Smoking status: Former Smoker      Smokeless tobacco: Never Used      Tobacco comment: smoked as a teenager    Substance and Sexual Activity      Alcohol use:  Yes        Alcohol/week: 0.8 standard dr 2\") and weight is 86.9 kg (191 lb 8 oz). Her oral temperature is 97.4 °F (36.3 °C). Her blood pressure is 146/97 (abnormal) and her pulse is 104. Her respiration is 18 and oxygen saturation is 98%.     07/18/19  1640 07/24/19  0602   BP:  (!) 146/97   Puls

## 2019-07-25 LAB
BASOPHILS # BLD AUTO: 0.02 X10(3) UL (ref 0–0.2)
BASOPHILS NFR BLD AUTO: 0.1 %
DEPRECATED RDW RBC AUTO: 38.2 FL (ref 35.1–46.3)
EOSINOPHIL # BLD AUTO: 0.01 X10(3) UL (ref 0–0.7)
EOSINOPHIL NFR BLD AUTO: 0.1 %
ERYTHROCYTE [DISTWIDTH] IN BLOOD BY AUTOMATED COUNT: 12.5 % (ref 11–15)
HCT VFR BLD AUTO: 29.7 % (ref 35–48)
HGB BLD-MCNC: 9.7 G/DL (ref 12–16)
IMM GRANULOCYTES # BLD AUTO: 0.06 X10(3) UL (ref 0–1)
IMM GRANULOCYTES NFR BLD: 0.4 %
LYMPHOCYTES # BLD AUTO: 1.33 X10(3) UL (ref 1–4)
LYMPHOCYTES NFR BLD AUTO: 9.3 %
MCH RBC QN AUTO: 27.7 PG (ref 26–34)
MCHC RBC AUTO-ENTMCNC: 32.7 G/DL (ref 31–37)
MCV RBC AUTO: 84.9 FL (ref 80–100)
MONOCYTES # BLD AUTO: 1.3 X10(3) UL (ref 0.1–1)
MONOCYTES NFR BLD AUTO: 9.1 %
NEUTROPHILS # BLD AUTO: 11.57 X10 (3) UL (ref 1.5–7.7)
NEUTROPHILS # BLD AUTO: 11.57 X10(3) UL (ref 1.5–7.7)
NEUTROPHILS NFR BLD AUTO: 81 %
PLATELET # BLD AUTO: 259 10(3)UL (ref 150–450)
RBC # BLD AUTO: 3.5 X10(6)UL (ref 3.8–5.3)
WBC # BLD AUTO: 14.3 X10(3) UL (ref 4–11)

## 2019-07-25 RX ORDER — SIMETHICONE 80 MG
80 TABLET,CHEWABLE ORAL
Status: DISCONTINUED | OUTPATIENT
Start: 2019-07-25 | End: 2019-07-26

## 2019-07-25 RX ORDER — HYDROCODONE BITARTRATE AND ACETAMINOPHEN 7.5; 325 MG/1; MG/1
1 TABLET ORAL EVERY 4 HOURS PRN
Status: DISCONTINUED | OUTPATIENT
Start: 2019-07-25 | End: 2019-07-26

## 2019-07-25 RX ORDER — SIMETHICONE 80 MG
80 TABLET,CHEWABLE ORAL
Status: DISCONTINUED | OUTPATIENT
Start: 2019-07-25 | End: 2019-07-25

## 2019-07-25 NOTE — PLAN OF CARE
PCA pump discontinued, norco used for pain control. Ice packs applied to abdominal incision. Mylicon added for gas pain. Mcconnell d/c this AM by noc RN, pt voiding normally, blood tinged urine, peripad in place. Ambulates SBA with RW.  Remote telemetry monitor

## 2019-07-25 NOTE — PROGRESS NOTES
[facility]    OB/GYNE Progress Note       Dennis Patient Status:  Inpatient    10/24/1980 MRN [id number]   Location [facility] 4W/SW/SE Attending Vannessa Miguel MD   Hosp Day # 1 PCP Cliff Beard DO        Assess CREATSERUM 0.73 03/19/2019    BUN 12 03/19/2019     03/19/2019    K 3.8 03/19/2019     03/19/2019    CO2 28.0 03/19/2019     (H) 03/19/2019    CA 9.3 03/19/2019    ALB 3.8 03/19/2019    ALKPHO 64 03/19/2019    BILT 0.7 03/19/2019    TP 8

## 2019-07-26 VITALS
OXYGEN SATURATION: 93 % | HEIGHT: 62 IN | SYSTOLIC BLOOD PRESSURE: 139 MMHG | DIASTOLIC BLOOD PRESSURE: 82 MMHG | BODY MASS INDEX: 35.24 KG/M2 | RESPIRATION RATE: 18 BRPM | WEIGHT: 191.5 LBS | TEMPERATURE: 99 F | HEART RATE: 109 BPM

## 2019-07-26 RX ORDER — HYDROCODONE BITARTRATE AND ACETAMINOPHEN 7.5; 325 MG/1; MG/1
1 TABLET ORAL EVERY 4 HOURS PRN
Qty: 24 TABLET | Refills: 0 | Status: ON HOLD | OUTPATIENT
Start: 2019-07-26 | End: 2019-08-09

## 2019-07-26 RX ORDER — IBUPROFEN 600 MG/1
600 TABLET ORAL EVERY 6 HOURS PRN
Qty: 30 TABLET | Refills: 0 | Status: SHIPPED | OUTPATIENT
Start: 2019-07-26 | End: 2019-08-12

## 2019-07-26 NOTE — DISCHARGE SUMMARY
[facility]    Discharge Summary     Donis Nair Patient Status:  Inpatient    10/24/1980 MRN [id number]   Location [facility] 4W/SW/SE Attending Divya Charles MD   Hosp Day # 2 PCP Sukhdeep Davis.  DO Kisha     Date of Admissi Dihydrochloride 5 MG Oral Tab  TAKE 1 TABLET BY MOUTH NIGHTLY. Fluticasone Propionate (FLONASE) 50 MCG/ACT Nasal Suspension  2 sprays by Nasal route daily. Multiple Vitamins-Minerals (MULTIVITAMIN ADULT OR)  Take 1 tablet by mouth daily.     Albuterol lozenge for comfort  · To feel muscle aches or soreness especially in the abdomen, chest or neck. The achy feeling should go away in the next 24 hours  · To feel weak, sleepy or \"wiped out\". Your should start feeling better in the next 24 hours.    · To e

## 2019-07-26 NOTE — PLAN OF CARE
Pain managed with norco. Ambulates SBA. Tolerating general diet. Plan to discharge today.       Problem: Patient Centered Care  Goal: Patient preferences are identified and integrated in the patient's plan of care  Description  Interventions:  - What would Discharge     Problem: SAFETY ADULT - FALL  Goal: Free from fall injury  Description  INTERVENTIONS:  - Assess pt frequently for physical needs  - Identify cognitive and physical deficits and behaviors that affect risk of falls.   - [place] fall precautio

## 2019-07-26 NOTE — PAYOR COMM NOTE
--------------  ADMISSION REVIEW     Payor: [address] [employer]  Subscriber #:  [id number]  Authorization Number: [id number]    Admit date: 7/24/19  Admit time: [address]       Admitting Physician: Melyssa Vargas MD  Attending Physician:  Melyssa Vargas, occurred 1 year ago. MEDICATIONS:  Norco 5/325 for pelvic pain p.r.n., Singulair, and ProAir HFA for asthma. ALLERGIES:  No known drug allergies. FAMILY HISTORY:  Father has a history of glaucoma.   Mother has a history of diabetes and hypertension FLUIDS:  1200 mL.     URINE OUTPUT:  50 mL and clear.       COUNTS:  Correct x2.     COMPLICATIONS:  None.      CONDITION:  Stable.     FINDINGS:  Uterus approximately 22-week size. Enlarged fibroid uterus, irregular in shape.   Normal fallopian tubes and more fibroids, 2 being posterior. These were removed through the incised cavity. Then, with 0 Vicryl, the endometrial cavity was closed in a running locking fashion.   Then, a second layer of 0 Vicryl was used in a running locking fashion to close the naldo

## 2019-07-26 NOTE — PAYOR COMM NOTE
--------------  CONTINUED STAY REVIEW    Payor: [facility]  Subscriber #:  [id number]  Authorization Number: [id number]    Admit date: 7/24/19  Admit time: 1140    REQUESTING APPROVAL 7/24-7/26    Admitting Physician: Vanessa Boykin MD  Attend   ABO O 07/22/2019     RH Positive 07/22/2019     WBC 14.3 (H) 07/25/2019     HGB 9.7 (L) 07/25/2019     HCT 29.7 (L) 07/25/2019     .0 07/25/2019     CREATSERUM 0.73 03/19/2019     BUN 12 03/19/2019      03/19/2019     K 3.8 03/19/2019     CL Date Action Dose Route User    7/26/2019 0930 Given 2 spray Nasal (Bilateral Nares) Megan Pemberton RN      HYDROcodone-acetaminophen (NORCO) 7.5-325 MG per tab 1 tablet     Date Action Dose Route User    7/26/2019 1414 Given 1 tablet Oral Amira Renee RN

## 2019-07-27 ENCOUNTER — TELEPHONE (OUTPATIENT)
Dept: OBGYN CLINIC | Facility: CLINIC | Age: 39
End: 2019-07-27

## 2019-07-29 ENCOUNTER — NURSE ONLY (OUTPATIENT)
Dept: ALLERGY | Facility: CLINIC | Age: 39
End: 2019-07-29
Payer: COMMERCIAL

## 2019-07-29 ENCOUNTER — NURSE ONLY (OUTPATIENT)
Dept: OBGYN CLINIC | Facility: CLINIC | Age: 39
End: 2019-07-29
Payer: COMMERCIAL

## 2019-07-29 VITALS — TEMPERATURE: 98 F

## 2019-07-29 DIAGNOSIS — J30.89 ENVIRONMENTAL AND SEASONAL ALLERGIES: ICD-10-CM

## 2019-07-29 PROCEDURE — 99024 POSTOP FOLLOW-UP VISIT: CPT | Performed by: OBSTETRICS & GYNECOLOGY

## 2019-07-29 PROCEDURE — 95165 ANTIGEN THERAPY SERVICES: CPT | Performed by: ALLERGY & IMMUNOLOGY

## 2019-07-29 PROCEDURE — 95117 IMMUNOTHERAPY INJECTIONS: CPT | Performed by: ALLERGY & IMMUNOLOGY

## 2019-08-05 ENCOUNTER — TELEPHONE (OUTPATIENT)
Dept: OBGYN CLINIC | Facility: CLINIC | Age: 39
End: 2019-08-05

## 2019-08-05 ENCOUNTER — NURSE ONLY (OUTPATIENT)
Dept: OBGYN CLINIC | Facility: CLINIC | Age: 39
End: 2019-08-05
Payer: COMMERCIAL

## 2019-08-05 VITALS — DIASTOLIC BLOOD PRESSURE: 82 MMHG | TEMPERATURE: 99 F | SYSTOLIC BLOOD PRESSURE: 136 MMHG

## 2019-08-05 DIAGNOSIS — Z09 SURGERY FOLLOW-UP: Primary | ICD-10-CM

## 2019-08-05 RX ORDER — MONTELUKAST SODIUM 10 MG/1
TABLET ORAL
Qty: 90 TABLET | Refills: 0 | Status: SHIPPED | OUTPATIENT
Start: 2019-08-05 | End: 2019-08-30

## 2019-08-05 NOTE — TELEPHONE ENCOUNTER
Pt last seen in Allergy for physician visit 12/17/2018 for .  . .    Mild intermittent asthma without complication  (primary encounter diagnosis)  Seasonal allergic rhinitis due to pollen  Allergy to [employer] house dust mite     Refill request received for

## 2019-08-05 NOTE — TELEPHONE ENCOUNTER
----- Message from Dung Aguirre MD sent at 8/5/2019 11:57 AM CDT -----  Regarding: FW: Other  Contact: [phone]      ----- Message -----  From: Alejandro Acosta RN  Sent: 8/5/2019  11:16 AM CDT  To: Dung Aguirre MD  Subject: FW: Other

## 2019-08-05 NOTE — PROGRESS NOTES
Pt 2 weeks s/p myomectomy with MLM on 7/24/19 presents to office for incision check. RN observes incision which is clean, dry and intact, a small area approximately 1 inch length reinforced with steristrip. Vitals WNL.  Pt is using abdominal binder for in
Normal rate, regular rhythm.  Heart sounds S1, S2.  No murmurs, rubs or gallops.

## 2019-08-05 NOTE — TELEPHONE ENCOUNTER
Received Disability forms for patient by fax from [facility] state they must be completed by 7/29/19

## 2019-08-05 NOTE — TELEPHONE ENCOUNTER
Called patient to notify them of RX refill and need for follow up appointment in October/November. Assisted patient in scheduling a follow up appointment for October 12th.

## 2019-08-06 NOTE — TELEPHONE ENCOUNTER
[employer] Disability form for Dr. Sandeep Zarate received in Forms dept. Logged for processing.  NK

## 2019-08-07 ENCOUNTER — TELEPHONE (OUTPATIENT)
Dept: OBGYN CLINIC | Facility: CLINIC | Age: 39
End: 2019-08-07

## 2019-08-07 ENCOUNTER — HOSPITAL ENCOUNTER (OUTPATIENT)
Facility: HOSPITAL | Age: 39
Setting detail: OBSERVATION
Discharge: HOME | End: 2019-08-09
Attending: OBSTETRICS & GYNECOLOGY | Admitting: OBSTETRICS & GYNECOLOGY
Payer: COMMERCIAL

## 2019-08-07 PROBLEM — N92.0 MENORRHAGIA: Status: ACTIVE | Noted: 2019-08-07

## 2019-08-07 PROCEDURE — 99220 INITIAL OBSERVATION CARE,LEVL III: CPT | Performed by: OBSTETRICS & GYNECOLOGY

## 2019-08-07 PROCEDURE — 30233N1 TRANSFUSION OF NONAUTOLOGOUS RED BLOOD CELLS INTO PERIPHERAL VEIN, PERCUTANEOUS APPROACH: ICD-10-PCS | Performed by: OBSTETRICS & GYNECOLOGY

## 2019-08-07 RX ORDER — IBUPROFEN 600 MG/1
600 TABLET ORAL EVERY 6 HOURS PRN
Status: DISCONTINUED | OUTPATIENT
Start: 2019-08-07 | End: 2019-08-09

## 2019-08-07 RX ORDER — CALCIUM CARBONATE 200(500)MG
1000 TABLET,CHEWABLE ORAL
Status: DISCONTINUED | OUTPATIENT
Start: 2019-08-07 | End: 2019-08-09

## 2019-08-07 RX ORDER — SODIUM CHLORIDE 0.9 % (FLUSH) 0.9 %
10 SYRINGE (ML) INJECTION AS NEEDED
Status: DISCONTINUED | OUTPATIENT
Start: 2019-08-07 | End: 2019-08-09

## 2019-08-07 RX ORDER — DOCUSATE SODIUM 100 MG/1
100 CAPSULE, LIQUID FILLED ORAL 2 TIMES DAILY
Status: DISCONTINUED | OUTPATIENT
Start: 2019-08-07 | End: 2019-08-09

## 2019-08-07 RX ORDER — DEXTROSE, SODIUM CHLORIDE, SODIUM LACTATE, POTASSIUM CHLORIDE, AND CALCIUM CHLORIDE 5; .6; .31; .03; .02 G/100ML; G/100ML; G/100ML; G/100ML; G/100ML
INJECTION, SOLUTION INTRAVENOUS CONTINUOUS
Status: DISCONTINUED | OUTPATIENT
Start: 2019-08-07 | End: 2019-08-09

## 2019-08-07 NOTE — TELEPHONE ENCOUNTER
Called pt to f/u on status and was informed she wet to [facility], pt to call back for f/u.

## 2019-08-07 NOTE — TELEPHONE ENCOUNTER
Pt with hx of exploratory laparotomy with multiple myomectomies on 7/24 c/o of feeling gush of blood while lying in bed followed by very heavy bleeding for the last 5 minutes. Currently on toilet since flow is heavy. Passing some small clots per pt.    Rep

## 2019-08-07 NOTE — TELEPHONE ENCOUNTER
Pt had surgery on 7/24, states she just started bleeding today and has been heavy, states currently sitting on the toilet.  Please advise

## 2019-08-08 LAB
ANTIBODY SCREEN: NEGATIVE
B-HCG SERPL-ACNC: <1 MIU/ML
BASOPHILS # BLD AUTO: 0.03 X10(3) UL (ref 0–0.2)
BASOPHILS # BLD AUTO: 0.04 X10(3) UL (ref 0–0.2)
BASOPHILS NFR BLD AUTO: 0.3 %
BASOPHILS NFR BLD AUTO: 0.5 %
DEPRECATED RDW RBC AUTO: 38.7 FL (ref 35.1–46.3)
DEPRECATED RDW RBC AUTO: 41.3 FL (ref 35.1–46.3)
EOSINOPHIL # BLD AUTO: 0.04 X10(3) UL (ref 0–0.7)
EOSINOPHIL # BLD AUTO: 0.11 X10(3) UL (ref 0–0.7)
EOSINOPHIL NFR BLD AUTO: 0.4 %
EOSINOPHIL NFR BLD AUTO: 1.3 %
ERYTHROCYTE [DISTWIDTH] IN BLOOD BY AUTOMATED COUNT: 12.7 % (ref 11–15)
ERYTHROCYTE [DISTWIDTH] IN BLOOD BY AUTOMATED COUNT: 13.5 % (ref 11–15)
HCT VFR BLD AUTO: 21.3 % (ref 35–48)
HCT VFR BLD AUTO: 26.7 % (ref 35–48)
HGB BLD-MCNC: 6.8 G/DL (ref 12–16)
HGB BLD-MCNC: 8.6 G/DL (ref 12–16)
IMM GRANULOCYTES # BLD AUTO: 0.02 X10(3) UL (ref 0–1)
IMM GRANULOCYTES # BLD AUTO: 0.03 X10(3) UL (ref 0–1)
IMM GRANULOCYTES NFR BLD: 0.2 %
IMM GRANULOCYTES NFR BLD: 0.3 %
LYMPHOCYTES # BLD AUTO: 2.83 X10(3) UL (ref 1–4)
LYMPHOCYTES # BLD AUTO: 2.9 X10(3) UL (ref 1–4)
LYMPHOCYTES NFR BLD AUTO: 30.8 %
LYMPHOCYTES NFR BLD AUTO: 34.6 %
MCH RBC QN AUTO: 27.3 PG (ref 26–34)
MCH RBC QN AUTO: 27.3 PG (ref 26–34)
MCHC RBC AUTO-ENTMCNC: 31.9 G/DL (ref 31–37)
MCHC RBC AUTO-ENTMCNC: 32.2 G/DL (ref 31–37)
MCV RBC AUTO: 84.8 FL (ref 80–100)
MCV RBC AUTO: 85.5 FL (ref 80–100)
MONOCYTES # BLD AUTO: 0.52 X10(3) UL (ref 0.1–1)
MONOCYTES # BLD AUTO: 0.58 X10(3) UL (ref 0.1–1)
MONOCYTES NFR BLD AUTO: 6.2 %
MONOCYTES NFR BLD AUTO: 6.3 %
NEUTROPHILS # BLD AUTO: 4.67 X10 (3) UL (ref 1.5–7.7)
NEUTROPHILS # BLD AUTO: 4.67 X10(3) UL (ref 1.5–7.7)
NEUTROPHILS # BLD AUTO: 5.83 X10 (3) UL (ref 1.5–7.7)
NEUTROPHILS # BLD AUTO: 5.83 X10(3) UL (ref 1.5–7.7)
NEUTROPHILS NFR BLD AUTO: 57.1 %
NEUTROPHILS NFR BLD AUTO: 62 %
PLATELET # BLD AUTO: 321 10(3)UL (ref 150–450)
PLATELET # BLD AUTO: 372 10(3)UL (ref 150–450)
RBC # BLD AUTO: 2.49 X10(6)UL (ref 3.8–5.3)
RBC # BLD AUTO: 3.15 X10(6)UL (ref 3.8–5.3)
RH BLOOD TYPE: POSITIVE
WBC # BLD AUTO: 8.2 X10(3) UL (ref 4–11)
WBC # BLD AUTO: 9.4 X10(3) UL (ref 4–11)

## 2019-08-08 RX ORDER — HYDROCODONE BITARTRATE AND ACETAMINOPHEN 5; 325 MG/1; MG/1
1 TABLET ORAL EVERY 6 HOURS PRN
Status: DISCONTINUED | OUTPATIENT
Start: 2019-08-08 | End: 2019-08-09

## 2019-08-08 RX ORDER — METHYLERGONOVINE MALEATE 0.2 MG/1
0.2 TABLET ORAL EVERY 8 HOURS
Status: DISCONTINUED | OUTPATIENT
Start: 2019-08-08 | End: 2019-08-09

## 2019-08-08 RX ORDER — ACETAMINOPHEN, ASPIRIN AND CAFFEINE 250; 250; 65 MG/1; MG/1; MG/1
1 TABLET, FILM COATED ORAL EVERY 6 HOURS PRN
Status: DISCONTINUED | OUTPATIENT
Start: 2019-08-08 | End: 2019-08-09

## 2019-08-08 RX ORDER — SODIUM CHLORIDE 9 MG/ML
INJECTION, SOLUTION INTRAVENOUS ONCE
Status: COMPLETED | OUTPATIENT
Start: 2019-08-08 | End: 2019-08-08

## 2019-08-08 RX ORDER — MELATONIN
325 2 TIMES DAILY WITH MEALS
Status: DISCONTINUED | OUTPATIENT
Start: 2019-08-08 | End: 2019-08-09

## 2019-08-08 RX ORDER — SODIUM CHLORIDE 0.9 % (FLUSH) 0.9 %
10 SYRINGE (ML) INJECTION AS NEEDED
Status: DISCONTINUED | OUTPATIENT
Start: 2019-08-08 | End: 2019-08-09

## 2019-08-08 NOTE — H&P
[facility]    History & Physical     Dennis Patient Status:  Observation    10/24/1980 MRN [id number]   Location [facility] 4W/SW/SE Attending Ning Rodriguez MD   Hosp Day # 0 PCP Shelly Hartley.  DO Kisha     Date of Admi (PROAIR HFA) 108 (90 Base) MCG/ACT Inhalation Aero Soln Inhale 2 puffs into the lungs every 6 (six) hours as needed for Wheezing.  Disp: 1 Inhaler Rfl: 0 8/6/2019 at Unknown time   Fluticasone Propionate (FLONASE) 50 MCG/ACT Nasal Suspension 2 sprays by Delfin 18  BP: (134)/(90) 134/90  No intake or output data in the 24 hours ending 08/07/19 2225    Constitutional: comfortable  Genitourinary: Labia: normal  Uterus: fundus below umbilicus  Vagina: no bleeding present. Scant spotting on pad.   Gastrointestinal: n

## 2019-08-08 NOTE — PLAN OF CARE
April arrived as a direct admit this evening under Dr. Yany Nuñez. Patient is alert and oriented x4, room air. Complaints of pain in the lower abd, cramping pain. PRN motrin given. Patient also complaining of a migraine, PRN given with some relief.  Vaginal b

## 2019-08-08 NOTE — PROGRESS NOTES
[facility]    OB/GYNE Progress Note       Dennis Patient Status:  Observation    10/24/1980 MRN [id number]   Location [facility] 4W/SW/SE Attending Mary Ann Whyte MD   Hosp Day # 0 PCP Shantel Mares.  DO Kisha        Asse 08/07/2019    HCT 21.3 (L) 08/07/2019    .0 08/07/2019    CREATSERUM 0.73 03/19/2019    BUN 12 03/19/2019     03/19/2019    K 3.8 03/19/2019     03/19/2019    CO2 28.0 03/19/2019     (H) 03/19/2019    CA 9.3 03/19/2019    ALB 3.8

## 2019-08-09 ENCOUNTER — TELEPHONE (OUTPATIENT)
Dept: OBGYN CLINIC | Facility: CLINIC | Age: 39
End: 2019-08-09

## 2019-08-09 VITALS
RESPIRATION RATE: 16 BRPM | SYSTOLIC BLOOD PRESSURE: 123 MMHG | HEART RATE: 83 BPM | TEMPERATURE: 98 F | OXYGEN SATURATION: 100 % | DIASTOLIC BLOOD PRESSURE: 79 MMHG

## 2019-08-09 LAB — BLOOD TYPE BARCODE: 5100

## 2019-08-09 PROCEDURE — 99217 OBSERVATION CARE DISCHARGE: CPT | Performed by: OBSTETRICS & GYNECOLOGY

## 2019-08-09 RX ORDER — HYDROCODONE BITARTRATE AND ACETAMINOPHEN 5; 325 MG/1; MG/1
1 TABLET ORAL EVERY 6 HOURS PRN
Qty: 15 TABLET | Refills: 0 | Status: SHIPPED | OUTPATIENT
Start: 2019-08-09 | End: 2021-05-04 | Stop reason: ALTCHOICE

## 2019-08-09 RX ORDER — METHYLERGONOVINE MALEATE 0.2 MG/1
0.2 TABLET ORAL EVERY 8 HOURS
Qty: 6 TABLET | Refills: 0 | Status: SHIPPED | OUTPATIENT
Start: 2019-08-09 | End: 2021-05-04 | Stop reason: ALTCHOICE

## 2019-08-09 RX ORDER — NORETHINDRONE ACETATE AND ETHINYL ESTRADIOL 1MG-20(21)
1 KIT ORAL DAILY
Qty: 1 PACKAGE | Refills: 11 | Status: SHIPPED | OUTPATIENT
Start: 2019-08-09 | End: 2021-05-04 | Stop reason: ALTCHOICE

## 2019-08-09 RX ORDER — MELATONIN
325 2 TIMES DAILY WITH MEALS
Qty: 60 TABLET | Refills: 1 | Status: SHIPPED | OUTPATIENT
Start: 2019-08-09 | End: 2019-10-22

## 2019-08-09 NOTE — PROGRESS NOTES
[facility] - [facility]    OB/GYNE Progress Note       Dennis Patient Status:  Observation    10/24/1980 MRN [id number]   Location [facility] 4W/SW/SE Attending Ning Rodriguez MD   Hosp Day # 0 PCP Shelly Hartley.  DO Kisha        Asse Negative 03/30/2019    HBVSAG Non-Reactive 12/20/2011    ABO O 08/07/2019    RH Positive 08/07/2019    WBC 8.2 08/08/2019    HGB 8.6 (L) 08/08/2019    HCT 26.7 (L) 08/08/2019    .0 08/08/2019    CREATSERUM 0.73 03/19/2019    BUN 12 03/19/2019    NA

## 2019-08-09 NOTE — PLAN OF CARE
Patient with minimal bleeding reported. Patient having some intermittent cramping related to medication prescribed to control bleeding. Pain well controlled with alternating motrin and Norco. Tolerating diet without nausea or vomiting.

## 2019-08-09 NOTE — DISCHARGE SUMMARY
[facility]    Discharge Summary     Kajal Leno Patient Status:  Observation    10/24/1980 MRN [id number]   Location [facility] 4W/SW/SE Attending Loc Delong MD   Hosp Day # 0 PCP Osei Gallardo.  DO Kisha     Date of Admis for Pain.    lamoTRIgine 25 MG Oral Tab  Take 25 mg by mouth nightly. Multiple Vitamins-Minerals (MULTIVITAMIN ADULT OR)  Take 1 tablet by mouth daily. Levocetirizine Dihydrochloride 5 MG Oral Tab  TAKE 1 TABLET BY MOUTH NIGHTLY.     Albuterol Sulfate

## 2019-08-10 NOTE — PLAN OF CARE
Problem: Patient Centered Care  Goal: Patient preferences are identified and integrated in the patient's plan of care  Description  Interventions:  - What would you like us to know as we care for you?   - Provide timely, complete, and accurate informatio for Discharge     Problem: SAFETY ADULT - FALL  Goal: Free from fall injury  Description  INTERVENTIONS:  - Assess pt frequently for physical needs  - Identify cognitive and physical deficits and behaviors that affect risk of falls.   - Deposit fall preca and 1 on Sunday and start BC pill. Patient ambulated to lobby with nurse. No distress noted.

## 2019-08-12 RX ORDER — IBUPROFEN 600 MG/1
TABLET ORAL
Qty: 30 TABLET | Refills: 0 | OUTPATIENT
Start: 2019-08-12

## 2019-08-13 ENCOUNTER — LAB ENCOUNTER (OUTPATIENT)
Dept: LAB | Facility: HOSPITAL | Age: 39
End: 2019-08-13
Attending: OBSTETRICS & GYNECOLOGY
Payer: COMMERCIAL

## 2019-08-13 ENCOUNTER — OFFICE VISIT (OUTPATIENT)
Dept: OBGYN CLINIC | Facility: CLINIC | Age: 39
End: 2019-08-13
Payer: COMMERCIAL

## 2019-08-13 VITALS — WEIGHT: 188 LBS | DIASTOLIC BLOOD PRESSURE: 70 MMHG | SYSTOLIC BLOOD PRESSURE: 108 MMHG | BODY MASS INDEX: 34 KG/M2

## 2019-08-13 DIAGNOSIS — N85.3 SUBINVOLUTION OF UTERUS: ICD-10-CM

## 2019-08-13 DIAGNOSIS — Z98.890 STATUS POST MYOMECTOMY: Primary | ICD-10-CM

## 2019-08-13 DIAGNOSIS — Z98.890 STATUS POST MYOMECTOMY: ICD-10-CM

## 2019-08-13 LAB
ALBUMIN SERPL-MCNC: 3.4 G/DL (ref 3.4–5)
ALBUMIN/GLOB SERPL: 0.9 {RATIO} (ref 1–2)
ALP LIVER SERPL-CCNC: 70 U/L (ref 37–98)
ALT SERPL-CCNC: 21 U/L (ref 13–56)
ANION GAP SERPL CALC-SCNC: 7 MMOL/L (ref 0–18)
AST SERPL-CCNC: 11 U/L (ref 15–37)
BASOPHILS # BLD AUTO: 0.06 X10(3) UL (ref 0–0.2)
BASOPHILS NFR BLD AUTO: 0.8 %
BILIRUB SERPL-MCNC: 0.3 MG/DL (ref 0.1–2)
BUN BLD-MCNC: 8 MG/DL (ref 7–18)
BUN/CREAT SERPL: 10 (ref 10–20)
CALCIUM BLD-MCNC: 9.1 MG/DL (ref 8.5–10.1)
CHLORIDE SERPL-SCNC: 105 MMOL/L (ref 98–112)
CO2 SERPL-SCNC: 27 MMOL/L (ref 21–32)
CREAT BLD-MCNC: 0.8 MG/DL (ref 0.55–1.02)
DEPRECATED RDW RBC AUTO: 43.5 FL (ref 35.1–46.3)
EOSINOPHIL # BLD AUTO: 0.15 X10(3) UL (ref 0–0.7)
EOSINOPHIL NFR BLD AUTO: 1.9 %
ERYTHROCYTE [DISTWIDTH] IN BLOOD BY AUTOMATED COUNT: 14.1 % (ref 11–15)
GLOBULIN PLAS-MCNC: 3.8 G/DL (ref 2.8–4.4)
GLUCOSE BLD-MCNC: 180 MG/DL (ref 70–99)
HCT VFR BLD AUTO: 29.9 % (ref 35–48)
HGB BLD-MCNC: 9.3 G/DL (ref 12–16)
IMM GRANULOCYTES # BLD AUTO: 0.03 X10(3) UL (ref 0–1)
IMM GRANULOCYTES NFR BLD: 0.4 %
LYMPHOCYTES # BLD AUTO: 3.28 X10(3) UL (ref 1–4)
LYMPHOCYTES NFR BLD AUTO: 41.7 %
M PROTEIN MFR SERPL ELPH: 7.2 G/DL (ref 6.4–8.2)
MCH RBC QN AUTO: 27 PG (ref 26–34)
MCHC RBC AUTO-ENTMCNC: 31.1 G/DL (ref 31–37)
MCV RBC AUTO: 86.9 FL (ref 80–100)
MONOCYTES # BLD AUTO: 0.47 X10(3) UL (ref 0.1–1)
MONOCYTES NFR BLD AUTO: 6 %
NEUTROPHILS # BLD AUTO: 3.87 X10 (3) UL (ref 1.5–7.7)
NEUTROPHILS # BLD AUTO: 3.87 X10(3) UL (ref 1.5–7.7)
NEUTROPHILS NFR BLD AUTO: 49.2 %
OSMOLALITY SERPL CALC.SUM OF ELEC: 291 MOSM/KG (ref 275–295)
PATIENT FASTING: NO
PLATELET # BLD AUTO: 459 10(3)UL (ref 150–450)
POTASSIUM SERPL-SCNC: 3.8 MMOL/L (ref 3.5–5.1)
RBC # BLD AUTO: 3.44 X10(6)UL (ref 3.8–5.3)
SODIUM SERPL-SCNC: 139 MMOL/L (ref 136–145)
WBC # BLD AUTO: 7.9 X10(3) UL (ref 4–11)

## 2019-08-13 PROCEDURE — 80053 COMPREHEN METABOLIC PANEL: CPT

## 2019-08-13 PROCEDURE — 36415 COLL VENOUS BLD VENIPUNCTURE: CPT

## 2019-08-13 PROCEDURE — 99024 POSTOP FOLLOW-UP VISIT: CPT | Performed by: OBSTETRICS & GYNECOLOGY

## 2019-08-13 PROCEDURE — 85025 COMPLETE CBC W/AUTO DIFF WBC: CPT

## 2019-08-13 RX ORDER — IBUPROFEN 600 MG/1
600 TABLET ORAL EVERY 6 HOURS PRN
Qty: 30 TABLET | Refills: 0 | Status: SHIPPED | OUTPATIENT
Start: 2019-08-13 | End: 2021-05-04 | Stop reason: ALTCHOICE

## 2019-08-13 NOTE — PROGRESS NOTES
HPI:    Patient ID: April R Lulu Stover is a 41year old female. Patient here for F/U visit after hospitalization for uterine hemorrhage. Patient had Multiple myomectomies three weeks ago and started to hemorrhage on Post-Op Day #14.   [facility]) Take 1 tablet by mouth daily. Disp:  Rfl:    Levocetirizine Dihydrochloride 5 MG Oral Tab TAKE 1 TABLET BY MOUTH NIGHTLY. Disp: 90 tablet Rfl: 1   Fluticasone Propionate (FLONASE) 50 MCG/ACT Nasal Suspension 2 sprays by Nasal route daily.  Disp: 1 Kamryn

## 2019-08-19 ENCOUNTER — NURSE ONLY (OUTPATIENT)
Dept: ALLERGY | Facility: CLINIC | Age: 39
End: 2019-08-19
Payer: COMMERCIAL

## 2019-08-19 DIAGNOSIS — J30.89 ENVIRONMENTAL AND SEASONAL ALLERGIES: ICD-10-CM

## 2019-08-19 PROCEDURE — 95117 IMMUNOTHERAPY INJECTIONS: CPT | Performed by: ALLERGY & IMMUNOLOGY

## 2019-08-20 NOTE — TELEPHONE ENCOUNTER
Patient called requesting office and hospital notes be faxed to [address] [place] [address]. Completed and faxed to [phone].  SC

## 2019-08-21 ENCOUNTER — HOSPITAL ENCOUNTER (OUTPATIENT)
Dept: ULTRASOUND IMAGING | Age: 39
Discharge: HOME | End: 2019-08-21
Attending: OBSTETRICS & GYNECOLOGY
Payer: COMMERCIAL

## 2019-08-21 DIAGNOSIS — Z98.890 STATUS POST MYOMECTOMY: ICD-10-CM

## 2019-08-21 PROCEDURE — 76856 US EXAM PELVIC COMPLETE: CPT | Performed by: OBSTETRICS & GYNECOLOGY

## 2019-08-21 PROCEDURE — 76830 TRANSVAGINAL US NON-OB: CPT | Performed by: OBSTETRICS & GYNECOLOGY

## 2019-08-31 ENCOUNTER — OFFICE VISIT (OUTPATIENT)
Dept: OBGYN CLINIC | Facility: CLINIC | Age: 39
End: 2019-08-31
Payer: COMMERCIAL

## 2019-08-31 VITALS
WEIGHT: 189 LBS | DIASTOLIC BLOOD PRESSURE: 99 MMHG | SYSTOLIC BLOOD PRESSURE: 155 MMHG | BODY MASS INDEX: 35 KG/M2 | HEART RATE: 94 BPM

## 2019-08-31 DIAGNOSIS — Z09 SURGERY FOLLOW-UP EXAMINATION: Primary | ICD-10-CM

## 2019-08-31 PROCEDURE — 99024 POSTOP FOLLOW-UP VISIT: CPT | Performed by: OBSTETRICS & GYNECOLOGY

## 2019-08-31 NOTE — PROGRESS NOTES
HPI:    Patient ID: April R Charmian Hodgkin is a 41year old female. Patient here for Post-Op exam.  S/P Multiple Myomectomy of very large fibroid uterus. Has been on first pack of OCPs. Took last active pill today.   Reports BTB starting nine days ago describ NIGHTLY. Disp: 90 tablet Rfl: 1   Albuterol Sulfate HFA (PROAIR HFA) 108 (90 Base) MCG/ACT Inhalation Aero Soln Inhale 2 puffs into the lungs every 6 (six) hours as needed for Wheezing.  Disp: 1 Inhaler Rfl: 0   Fluticasone Propionate (FLONASE) 50 MCG/ACT N

## 2019-09-01 ENCOUNTER — HOSPITAL ENCOUNTER (EMERGENCY)
Facility: HOSPITAL | Age: 39
Discharge: HOME | End: 2019-09-01
Attending: EMERGENCY MEDICINE
Payer: COMMERCIAL

## 2019-09-01 VITALS
RESPIRATION RATE: 17 BRPM | HEART RATE: 88 BPM | BODY MASS INDEX: 34.78 KG/M2 | OXYGEN SATURATION: 97 % | HEIGHT: 62 IN | TEMPERATURE: 98 F | WEIGHT: 189 LBS | DIASTOLIC BLOOD PRESSURE: 96 MMHG | SYSTOLIC BLOOD PRESSURE: 131 MMHG

## 2019-09-01 DIAGNOSIS — I10 HYPERTENSION, UNSPECIFIED TYPE: Primary | ICD-10-CM

## 2019-09-01 DIAGNOSIS — R73.9 ELEVATED BLOOD SUGAR: ICD-10-CM

## 2019-09-01 LAB
ANION GAP SERPL CALC-SCNC: 6 MMOL/L (ref 0–18)
BACTERIA UR QL AUTO: NEGATIVE /HPF
BASOPHILS # BLD AUTO: 0.06 X10(3) UL (ref 0–0.2)
BASOPHILS NFR BLD AUTO: 0.8 %
BILIRUB UR QL: NEGATIVE
BUN BLD-MCNC: 9 MG/DL (ref 7–18)
BUN/CREAT SERPL: 11 (ref 10–20)
CALCIUM BLD-MCNC: 8.8 MG/DL (ref 8.5–10.1)
CHLORIDE SERPL-SCNC: 104 MMOL/L (ref 98–112)
CLARITY UR: CLEAR
CO2 SERPL-SCNC: 29 MMOL/L (ref 21–32)
COLOR UR: YELLOW
CREAT BLD-MCNC: 0.82 MG/DL (ref 0.55–1.02)
DEPRECATED RDW RBC AUTO: 46.1 FL (ref 35.1–46.3)
EOSINOPHIL # BLD AUTO: 0.16 X10(3) UL (ref 0–0.7)
EOSINOPHIL NFR BLD AUTO: 2.1 %
ERYTHROCYTE [DISTWIDTH] IN BLOOD BY AUTOMATED COUNT: 14.3 % (ref 11–15)
GLUCOSE BLD-MCNC: 182 MG/DL (ref 70–99)
GLUCOSE UR-MCNC: 50 MG/DL
HCT VFR BLD AUTO: 35.2 % (ref 35–48)
HGB BLD-MCNC: 11.1 G/DL (ref 12–16)
IMM GRANULOCYTES # BLD AUTO: 0.02 X10(3) UL (ref 0–1)
IMM GRANULOCYTES NFR BLD: 0.3 %
KETONES UR-MCNC: NEGATIVE MG/DL
LEUKOCYTE ESTERASE UR QL STRIP.AUTO: NEGATIVE
LYMPHOCYTES # BLD AUTO: 2.51 X10(3) UL (ref 1–4)
LYMPHOCYTES NFR BLD AUTO: 32.5 %
MCH RBC QN AUTO: 27.8 PG (ref 26–34)
MCHC RBC AUTO-ENTMCNC: 31.5 G/DL (ref 31–37)
MCV RBC AUTO: 88 FL (ref 80–100)
MONOCYTES # BLD AUTO: 0.52 X10(3) UL (ref 0.1–1)
MONOCYTES NFR BLD AUTO: 6.7 %
NEUTROPHILS # BLD AUTO: 4.45 X10 (3) UL (ref 1.5–7.7)
NEUTROPHILS # BLD AUTO: 4.45 X10(3) UL (ref 1.5–7.7)
NEUTROPHILS NFR BLD AUTO: 57.6 %
NITRITE UR QL STRIP.AUTO: NEGATIVE
OSMOLALITY SERPL CALC.SUM OF ELEC: 291 MOSM/KG (ref 275–295)
PH UR: 6 [PH] (ref 5–8)
PLATELET # BLD AUTO: 360 10(3)UL (ref 150–450)
POTASSIUM SERPL-SCNC: 3.8 MMOL/L (ref 3.5–5.1)
PROT UR-MCNC: 30 MG/DL
RBC # BLD AUTO: 4 X10(6)UL (ref 3.8–5.3)
RBC #/AREA URNS AUTO: 892 /HPF
SODIUM SERPL-SCNC: 139 MMOL/L (ref 136–145)
SP GR UR STRIP: 1.01 (ref 1–1.03)
UROBILINOGEN UR STRIP-ACNC: <2
VIT C UR-MCNC: NEGATIVE MG/DL
WBC # BLD AUTO: 7.7 X10(3) UL (ref 4–11)
WBC #/AREA URNS AUTO: 2 /HPF

## 2019-09-01 PROCEDURE — 36415 COLL VENOUS BLD VENIPUNCTURE: CPT

## 2019-09-01 PROCEDURE — 81001 URINALYSIS AUTO W/SCOPE: CPT | Performed by: EMERGENCY MEDICINE

## 2019-09-01 PROCEDURE — 99283 EMERGENCY DEPT VISIT LOW MDM: CPT

## 2019-09-01 PROCEDURE — 80048 BASIC METABOLIC PNL TOTAL CA: CPT | Performed by: EMERGENCY MEDICINE

## 2019-09-01 PROCEDURE — 85025 COMPLETE CBC W/AUTO DIFF WBC: CPT | Performed by: EMERGENCY MEDICINE

## 2019-09-01 RX ORDER — AMLODIPINE BESYLATE 5 MG/1
5 TABLET ORAL DAILY
Qty: 30 TABLET | Refills: 0 | Status: SHIPPED | OUTPATIENT
Start: 2019-09-01 | End: 2019-09-06

## 2019-09-01 RX ORDER — ACETAMINOPHEN 500 MG
1000 TABLET ORAL ONCE
Status: COMPLETED | OUTPATIENT
Start: 2019-09-01 | End: 2019-09-01

## 2019-09-01 NOTE — ED INITIAL ASSESSMENT (HPI)
Pt reports systolic BP readings in the 180s, was at PCP yesterday morning and was told to get a BP cuff to keep track at home, states that she has also been having frontal headaches with dizziness and nausea, hx of migraines.  Denies vision changes

## 2019-09-01 NOTE — ED NOTES
Discharge instructions given to pt. Pt verbalized understanding of home care, medication use, and to follow up with pcp. Pt denied further questions or concerns. Pt ambulatory out of ED with family, discharged in stable condition.

## 2019-09-02 NOTE — ED PROVIDER NOTES
Patient Seen in: [facility] AND [facility] Emergency Department    History   Patient presents with:  Hypertension (cardiovascular)    Stated Complaint: hypertension    HPI    Patient complains of of elevated blood pressure since last night.   Patient does have hi TAKE 1 TABLET BY MOUTH EVERY DAY AT NIGHT   lamoTRIgine 25 MG Oral Tab,  Take 25 mg by mouth nightly. Multiple Vitamins-Minerals (MULTIVITAMIN ADULT OR),  Take 1 tablet by mouth daily.    Levocetirizine Dihydrochloride 5 MG Oral Tab,  TAKE 1 TABLET BY PRAKASH meningeal signs  LUNGS: no resp distress, cta bilateral  CARDIO: RRR without murmur  GI: abdomen is soft and non tender, no masses, nl bowel sounds   EXTREMITIES: from, 5/5 strength in all 4 ext, no edema  NEURO: alert and oiented *3, 2-12 intact, no focal 7:56     Approved by (CST):  Gina De Souza MD on 8/22/2019 at 8:03                MDM   Will start norvasc, keep bp log fu with primary./              Disposition and Plan     Clinical Impression:  Hypertension, unspecified type  (primary encounter diag

## 2019-09-03 RX ORDER — MONTELUKAST SODIUM 10 MG/1
TABLET ORAL
Qty: 30 TABLET | Refills: 1 | Status: SHIPPED | OUTPATIENT
Start: 2019-09-03 | End: 2019-10-31

## 2019-09-03 NOTE — TELEPHONE ENCOUNTER
Spoke with patient. Verified name and . Informed patient Singulair has been refilled and will see her at her next appointment in Oct. 2019. Patient verbalizes understanding.

## 2019-09-03 NOTE — TELEPHONE ENCOUNTER
Received refill request for Singulair 10 MG- 1 tablet to mouth daily. LOV 12-17--18    Last refill 8-3-19. Patient has an appointment 10-12-19 at 11:30 am.     Refill pended and forwarded to Dr. Walker Allen for further directions.

## 2019-09-06 ENCOUNTER — NURSE ONLY (OUTPATIENT)
Dept: OBGYN CLINIC | Facility: CLINIC | Age: 39
End: 2019-09-06
Payer: COMMERCIAL

## 2019-09-06 ENCOUNTER — OFFICE VISIT (OUTPATIENT)
Dept: FAMILY MEDICINE CLINIC | Facility: CLINIC | Age: 39
End: 2019-09-06
Payer: COMMERCIAL

## 2019-09-06 VITALS
BODY MASS INDEX: 34.57 KG/M2 | WEIGHT: 187.88 LBS | HEART RATE: 99 BPM | SYSTOLIC BLOOD PRESSURE: 138 MMHG | DIASTOLIC BLOOD PRESSURE: 88 MMHG | HEIGHT: 62 IN

## 2019-09-06 VITALS — SYSTOLIC BLOOD PRESSURE: 142 MMHG | DIASTOLIC BLOOD PRESSURE: 82 MMHG

## 2019-09-06 DIAGNOSIS — I10 ESSENTIAL HYPERTENSION: ICD-10-CM

## 2019-09-06 DIAGNOSIS — Z01.30 BLOOD PRESSURE CHECK: Primary | ICD-10-CM

## 2019-09-06 DIAGNOSIS — R73.9 HYPERGLYCEMIA: Primary | ICD-10-CM

## 2019-09-06 PROCEDURE — 99214 OFFICE O/P EST MOD 30 MIN: CPT | Performed by: FAMILY MEDICINE

## 2019-09-06 PROCEDURE — 90686 IIV4 VACC NO PRSV 0.5 ML IM: CPT | Performed by: FAMILY MEDICINE

## 2019-09-06 PROCEDURE — 90471 IMMUNIZATION ADMIN: CPT | Performed by: FAMILY MEDICINE

## 2019-09-06 RX ORDER — LABETALOL 100 MG/1
100 TABLET, FILM COATED ORAL 2 TIMES DAILY
Qty: 60 TABLET | Refills: 1 | Status: SHIPPED | OUTPATIENT
Start: 2019-09-06 | End: 2019-09-10

## 2019-09-06 NOTE — PROGRESS NOTES
Pt presents for BP check. Pt has been taking Amlodipine 5mg each morning since 9/1. Per patient has had fluctuating Bps since initiation of medication. Has kept log which she brings to today's visit. Last at home BP was 125/88 this morning.   Pt BP readi

## 2019-09-06 NOTE — PROGRESS NOTES
Blood pressure 138/88, pulse 99, height 5' 2\" (1.575 m), weight 187 lb 14.4 oz (85.2 kg), last menstrual period 06/29/2019, not currently breastfeeding.     Patient presents today following up for myomectomy history of elevated blood sugar reading and hype

## 2019-09-07 ENCOUNTER — LAB ENCOUNTER (OUTPATIENT)
Dept: LAB | Age: 39
End: 2019-09-07
Attending: FAMILY MEDICINE
Payer: COMMERCIAL

## 2019-09-07 DIAGNOSIS — R73.9 HYPERGLYCEMIA: ICD-10-CM

## 2019-09-07 DIAGNOSIS — I10 ESSENTIAL HYPERTENSION: ICD-10-CM

## 2019-09-07 LAB
ALT SERPL-CCNC: 25 U/L (ref 13–56)
ANION GAP SERPL CALC-SCNC: 7 MMOL/L (ref 0–18)
AST SERPL-CCNC: 18 U/L (ref 15–37)
BASOPHILS # BLD AUTO: 0.05 X10(3) UL (ref 0–0.2)
BASOPHILS NFR BLD AUTO: 0.9 %
BUN BLD-MCNC: 10 MG/DL (ref 7–18)
BUN/CREAT SERPL: 10.4 (ref 10–20)
CALCIUM BLD-MCNC: 9.3 MG/DL (ref 8.5–10.1)
CHLORIDE SERPL-SCNC: 104 MMOL/L (ref 98–112)
CO2 SERPL-SCNC: 27 MMOL/L (ref 21–32)
CREAT BLD-MCNC: 0.96 MG/DL (ref 0.55–1.02)
CREAT UR-SCNC: 202 MG/DL
DEPRECATED RDW RBC AUTO: 44.9 FL (ref 35.1–46.3)
EOSINOPHIL # BLD AUTO: 0.09 X10(3) UL (ref 0–0.7)
EOSINOPHIL NFR BLD AUTO: 1.6 %
ERYTHROCYTE [DISTWIDTH] IN BLOOD BY AUTOMATED COUNT: 13.9 % (ref 11–15)
EST. AVERAGE GLUCOSE BLD GHB EST-MCNC: 146 MG/DL (ref 68–126)
GLUCOSE BLD-MCNC: 257 MG/DL (ref 70–99)
HBA1C MFR BLD HPLC: 6.7 % (ref ?–5.7)
HCT VFR BLD AUTO: 35.9 % (ref 35–48)
HGB BLD-MCNC: 11 G/DL (ref 12–16)
IMM GRANULOCYTES # BLD AUTO: 0.01 X10(3) UL (ref 0–1)
IMM GRANULOCYTES NFR BLD: 0.2 %
LYMPHOCYTES # BLD AUTO: 1.16 X10(3) UL (ref 1–4)
LYMPHOCYTES NFR BLD AUTO: 20 %
MCH RBC QN AUTO: 26.9 PG (ref 26–34)
MCHC RBC AUTO-ENTMCNC: 30.6 G/DL (ref 31–37)
MCV RBC AUTO: 87.8 FL (ref 80–100)
MICROALBUMIN UR-MCNC: 4.48 MG/DL
MICROALBUMIN/CREAT 24H UR-RTO: 22.2 UG/MG (ref ?–30)
MONOCYTES # BLD AUTO: 0.64 X10(3) UL (ref 0.1–1)
MONOCYTES NFR BLD AUTO: 11 %
NEUTROPHILS # BLD AUTO: 3.85 X10 (3) UL (ref 1.5–7.7)
NEUTROPHILS # BLD AUTO: 3.85 X10(3) UL (ref 1.5–7.7)
NEUTROPHILS NFR BLD AUTO: 66.3 %
OSMOLALITY SERPL CALC.SUM OF ELEC: 294 MOSM/KG (ref 275–295)
PATIENT FASTING: NO
PLATELET # BLD AUTO: 379 10(3)UL (ref 150–450)
POTASSIUM SERPL-SCNC: 3.6 MMOL/L (ref 3.5–5.1)
RBC # BLD AUTO: 4.09 X10(6)UL (ref 3.8–5.3)
SODIUM SERPL-SCNC: 138 MMOL/L (ref 136–145)
TSI SER-ACNC: 0.61 MIU/ML (ref 0.36–3.74)
WBC # BLD AUTO: 5.8 X10(3) UL (ref 4–11)

## 2019-09-07 PROCEDURE — 83036 HEMOGLOBIN GLYCOSYLATED A1C: CPT

## 2019-09-07 PROCEDURE — 85025 COMPLETE CBC W/AUTO DIFF WBC: CPT

## 2019-09-07 PROCEDURE — 84460 ALANINE AMINO (ALT) (SGPT): CPT

## 2019-09-07 PROCEDURE — 84443 ASSAY THYROID STIM HORMONE: CPT

## 2019-09-07 PROCEDURE — 82570 ASSAY OF URINE CREATININE: CPT

## 2019-09-07 PROCEDURE — 82043 UR ALBUMIN QUANTITATIVE: CPT

## 2019-09-07 PROCEDURE — 84450 TRANSFERASE (AST) (SGOT): CPT

## 2019-09-07 PROCEDURE — 36415 COLL VENOUS BLD VENIPUNCTURE: CPT

## 2019-09-07 PROCEDURE — 82088 ASSAY OF ALDOSTERONE: CPT

## 2019-09-07 PROCEDURE — 80048 BASIC METABOLIC PNL TOTAL CA: CPT

## 2019-09-08 ENCOUNTER — TELEPHONE (OUTPATIENT)
Dept: FAMILY MEDICINE CLINIC | Facility: CLINIC | Age: 39
End: 2019-09-08

## 2019-09-09 ENCOUNTER — APPOINTMENT (OUTPATIENT)
Dept: LAB | Age: 39
End: 2019-09-09
Attending: FAMILY MEDICINE
Payer: COMMERCIAL

## 2019-09-09 ENCOUNTER — NURSE TRIAGE (OUTPATIENT)
Dept: FAMILY MEDICINE CLINIC | Facility: CLINIC | Age: 39
End: 2019-09-09

## 2019-09-09 DIAGNOSIS — I10 ESSENTIAL HYPERTENSION: ICD-10-CM

## 2019-09-09 LAB
CHOLEST SMN-MCNC: 174 MG/DL (ref ?–200)
HDLC SERPL-MCNC: 48 MG/DL (ref 40–59)
LDLC SERPL CALC-MCNC: 103 MG/DL (ref ?–100)
NONHDLC SERPL-MCNC: 126 MG/DL (ref ?–130)
PATIENT FASTING: YES
TRIGL SERPL-MCNC: 116 MG/DL (ref 30–149)
VLDLC SERPL CALC-MCNC: 23 MG/DL (ref 0–30)

## 2019-09-09 PROCEDURE — 80061 LIPID PANEL: CPT

## 2019-09-09 PROCEDURE — 36415 COLL VENOUS BLD VENIPUNCTURE: CPT

## 2019-09-09 NOTE — TELEPHONE ENCOUNTER
----- Message from April [address] sent at 9/9/2019  4:19 PM CDT -----  Regarding: Visit Follow-up Question  Contact: [phone]  [facility] Doctor Nando Argueta been experiencing a headache for the past few days, on and off.  I'm also experiencing some l

## 2019-09-09 NOTE — TELEPHONE ENCOUNTER
Action Requested: Summary for Provider     []  Critical Lab, Recommendations Needed  [x] Need Additional Advice  []   FYI    []   Need Orders  [] Need Medications Sent to Pharmacy  []  Other     SUMMARY:     Reason for call: Headache  Onset: Data Unavailab

## 2019-09-09 NOTE — TELEPHONE ENCOUNTER
Spoke with pt. Reports being switched from amlodipine to propranolol but feeling bad on it. Having chest pressure and pulse high and BP 190s/100.  Advised to take her amlodipine 5 mg dose now and in the morning and start taking amlodipine 10 mg in AM corbin

## 2019-09-09 NOTE — TELEPHONE ENCOUNTER
Paging    Message # [phone]         2019 06:40p   [[facility]]  To:  From:  LEATHA Ng MD:  Phone#:  ----------------------------------------------------------------------  Farzana Cole [phone],  [birth year] RE POSSIBLE REACTION TO RX;  Ap Kind

## 2019-09-09 NOTE — TELEPHONE ENCOUNTER
Take 5 mg tonight extra of amlodipine and tomorrow begin 10 mg amlodipine daily until seen by  [facility] [place]

## 2019-09-10 ENCOUNTER — OFFICE VISIT (OUTPATIENT)
Dept: FAMILY MEDICINE CLINIC | Facility: CLINIC | Age: 39
End: 2019-09-10
Payer: COMMERCIAL

## 2019-09-10 VITALS
WEIGHT: 184.5 LBS | HEIGHT: 62 IN | DIASTOLIC BLOOD PRESSURE: 97 MMHG | HEART RATE: 112 BPM | SYSTOLIC BLOOD PRESSURE: 163 MMHG | BODY MASS INDEX: 33.95 KG/M2

## 2019-09-10 DIAGNOSIS — I10 ESSENTIAL HYPERTENSION: Primary | ICD-10-CM

## 2019-09-10 DIAGNOSIS — E11.9 TYPE 2 DIABETES MELLITUS WITHOUT COMPLICATION, WITHOUT LONG-TERM CURRENT USE OF INSULIN (HCC): ICD-10-CM

## 2019-09-10 PROCEDURE — 99214 OFFICE O/P EST MOD 30 MIN: CPT | Performed by: FAMILY MEDICINE

## 2019-09-10 RX ORDER — INSULIN PUMP/INFUS. SET/METER
1 KIT MISCELLANEOUS DAILY
Qty: 1 KIT | Refills: 0 | Status: SHIPPED | OUTPATIENT
Start: 2019-09-10 | End: 2019-09-11 | Stop reason: CLARIF

## 2019-09-10 RX ORDER — METOPROLOL SUCCINATE 50 MG/1
50 TABLET, EXTENDED RELEASE ORAL DAILY
Qty: 30 TABLET | Refills: 1 | Status: SHIPPED | OUTPATIENT
Start: 2019-09-10 | End: 2019-10-31

## 2019-09-10 NOTE — PATIENT INSTRUCTIONS
A1C  Does this test have other names? Hemoglobin A1c; HbA1c; glycosylated hemoglobin; glycohemoglobin; Glycated hemoglobin  What is this test?  A1C is a blood test that shows average blood sugar (glucose) levels over the last 3 months.  The test is done Test results may vary depending on your age, gender, health history, the method used for the test, and other things. Your test results may not mean you have a problem.  Ask your healthcare provider what your test results mean for you.   A1C results are repo © 4258-3819 [employer] [address]. [address] [facility], [address] [place]. All rights reserved. This information is not intended as a substitute for professional medical care. Always follow your healthcare professional's instructions.     Lizzette Lyles. Kedar Restrepo

## 2019-09-10 NOTE — PROGRESS NOTES
Blood pressure (!) 163/97, pulse 112, height 5' 2\" (1.575 m), weight 184 lb 8 oz (83.7 kg), last menstrual period 06/29/2019, not currently breastfeeding. Following up for hypertension and hyperglycemia.   History of anxiety but no recent panic attacks

## 2019-09-11 ENCOUNTER — TELEPHONE (OUTPATIENT)
Dept: FAMILY MEDICINE CLINIC | Facility: CLINIC | Age: 39
End: 2019-09-11

## 2019-09-11 ENCOUNTER — APPOINTMENT (OUTPATIENT)
Dept: LAB | Age: 39
End: 2019-09-11
Attending: FAMILY MEDICINE
Payer: COMMERCIAL

## 2019-09-11 ENCOUNTER — LAB ENCOUNTER (OUTPATIENT)
Dept: LAB | Age: 39
End: 2019-09-11
Attending: FAMILY MEDICINE
Payer: COMMERCIAL

## 2019-09-11 ENCOUNTER — NURSE ONLY (OUTPATIENT)
Dept: ALLERGY | Facility: CLINIC | Age: 39
End: 2019-09-11
Payer: COMMERCIAL

## 2019-09-11 DIAGNOSIS — I10 ESSENTIAL HYPERTENSION: Primary | ICD-10-CM

## 2019-09-11 DIAGNOSIS — E11.9 TYPE 2 DIABETES MELLITUS WITHOUT COMPLICATION, WITHOUT LONG-TERM CURRENT USE OF INSULIN (HCC): ICD-10-CM

## 2019-09-11 DIAGNOSIS — J30.89 ENVIRONMENTAL AND SEASONAL ALLERGIES: ICD-10-CM

## 2019-09-11 LAB
ALDOSTERONE: 6.4 NG/DL
CREAT UR-SCNC: 428 MG/DL
MICROALBUMIN UR-MCNC: 11.9 MG/DL
MICROALBUMIN/CREAT 24H UR-RTO: 27.8 UG/MG (ref ?–30)

## 2019-09-11 PROCEDURE — 82043 UR ALBUMIN QUANTITATIVE: CPT

## 2019-09-11 PROCEDURE — 93010 ELECTROCARDIOGRAM REPORT: CPT | Performed by: FAMILY MEDICINE

## 2019-09-11 PROCEDURE — 95117 IMMUNOTHERAPY INJECTIONS: CPT | Performed by: ALLERGY & IMMUNOLOGY

## 2019-09-11 PROCEDURE — 93005 ELECTROCARDIOGRAM TRACING: CPT

## 2019-09-11 PROCEDURE — 82570 ASSAY OF URINE CREATININE: CPT

## 2019-09-11 RX ORDER — LANCETS
EACH MISCELLANEOUS
Qty: 100 EACH | Refills: 0 | Status: SHIPPED | OUTPATIENT
Start: 2019-09-11 | End: 2019-12-30

## 2019-09-11 RX ORDER — BLOOD-GLUCOSE METER
KIT MISCELLANEOUS
Qty: 1 KIT | Refills: 0 | Status: SHIPPED | OUTPATIENT
Start: 2019-09-11 | End: 2021-12-13

## 2019-09-11 NOTE — TELEPHONE ENCOUNTER
Dr. Hernesto Mcrae: please review. You sent a prescription for Insulin infusion pump. Does the patient need insulin pump or did you intend to send a RX for glucometer accu-chek? If glucometer, please indicate how many times she should check.

## 2019-09-18 ENCOUNTER — HOSPITAL ENCOUNTER (OUTPATIENT)
Dept: ENDOCRINOLOGY | Facility: HOSPITAL | Age: 39
Discharge: HOME | End: 2019-09-18
Attending: FAMILY MEDICINE
Payer: COMMERCIAL

## 2019-09-18 ENCOUNTER — TELEPHONE (OUTPATIENT)
Dept: ENDOCRINOLOGY | Facility: HOSPITAL | Age: 39
End: 2019-09-18

## 2019-09-18 VITALS — WEIGHT: 186.5 LBS | BODY MASS INDEX: 34 KG/M2

## 2019-09-18 DIAGNOSIS — E11.65 TYPE 2 DIABETES MELLITUS WITH HYPERGLYCEMIA, WITHOUT LONG-TERM CURRENT USE OF INSULIN (HCC): Primary | ICD-10-CM

## 2019-09-18 DIAGNOSIS — E11.9 TYPE 2 DIABETES MELLITUS WITHOUT COMPLICATION, WITHOUT LONG-TERM CURRENT USE OF INSULIN (HCC): ICD-10-CM

## 2019-09-18 NOTE — PROGRESS NOTES
Arlette Collins  : 10/24/1980 attended individual initial assessment for Diabetes Education:    Date: 2019   Start time: 12p End time: 1p    HgbA1C (%)   Date Value   2019 6.7 (H)        Assessment:     Patient presents with referral from PCP glucose as directed (will begin to include 2 hour PP readings)  Follow Basic Diet Guidelines. Attend Next individual class for Step 2 discussion    Written materials provided for all areas covered.     Patient verbalized understanding and has no further q

## 2019-09-18 NOTE — TELEPHONE ENCOUNTER
Patient is unable to attend Diabetes Education classes due to schedule. Please sign attached pended order for completion of her education in individual sessions.      Thank you Dr Alden Beyer

## 2019-09-26 NOTE — TELEPHONE ENCOUNTER
Refill sent to    Glucose Blood (ONETOUCH ULTRA BLUE) In Vitro Strip 100 strip 3 9/11/2019     Sig: check sugar fasting MWF and at bedtime Koko Phillips    Sent to pharmacy as: Georgina Mendoza In [employer]    Notes to Pharmacy: DX Code: E11.9,

## 2019-09-27 ENCOUNTER — OFFICE VISIT (OUTPATIENT)
Dept: OBGYN CLINIC | Facility: CLINIC | Age: 39
End: 2019-09-27
Payer: COMMERCIAL

## 2019-09-27 VITALS — WEIGHT: 188.81 LBS | BODY MASS INDEX: 35 KG/M2 | DIASTOLIC BLOOD PRESSURE: 84 MMHG | SYSTOLIC BLOOD PRESSURE: 124 MMHG

## 2019-09-27 DIAGNOSIS — Z09 SURGERY FOLLOW-UP: Primary | ICD-10-CM

## 2019-09-27 PROCEDURE — 99024 POSTOP FOLLOW-UP VISIT: CPT | Performed by: OBSTETRICS & GYNECOLOGY

## 2019-09-27 NOTE — TELEPHONE ENCOUNTER
Refill passed per [facility] protocol.  Previously sent to [facility], this request is for mail order  Refill Protocol Appointment Criteria  · Appointment scheduled in the past 12 months or in the next 3 months  Recent Outpatient Visits            2 weeks ago

## 2019-09-27 NOTE — PROGRESS NOTES
HPI:    Patient ID: April R Olivia Ureña is a 41year old female. Patient here for post-op exam.  BP is now under control and Tachycardia has also resolved with Metoprolol. Patient plans on using condoms.   Working on diet for weight loss and diabetes contro Propionate (FLONASE) 50 MCG/ACT Nasal Suspension 2 sprays by Nasal route daily. Disp: 1 Bottle Rfl: 5   ibuprofen 600 MG Oral Tab Take 1 tablet (600 mg total) by mouth every 6 (six) hours as needed for Pain.  Disp: 30 tablet Rfl: 0   HYDROcodone-acetaminoph

## 2019-10-02 ENCOUNTER — PATIENT MESSAGE (OUTPATIENT)
Dept: FAMILY MEDICINE CLINIC | Facility: CLINIC | Age: 39
End: 2019-10-02

## 2019-10-02 NOTE — TELEPHONE ENCOUNTER
From: April KHADIJAH Stover  To: Virgie Rose DO  Sent: 10/2/2019 8:32 AM CDT  Subject: Prescription Question    Hello,    The test strips I received are the wrong ones for my meter and I'm unable to use them.  Can you send the correct prescription in the s

## 2019-10-03 RX ORDER — BLOOD SUGAR DIAGNOSTIC
STRIP MISCELLANEOUS
Qty: 100 STRIP | Refills: 3 | Status: SHIPPED | OUTPATIENT
Start: 2019-10-03 | End: 2019-10-03

## 2019-10-03 RX ORDER — BLOOD SUGAR DIAGNOSTIC
STRIP MISCELLANEOUS
Qty: 100 STRIP | Refills: 3 | Status: SHIPPED | OUTPATIENT
Start: 2019-10-03 | End: 2019-10-04

## 2019-10-03 NOTE — TELEPHONE ENCOUNTER
Spoke to pharmacist Jairo bee at [facility] in [employer]. She states that patient was dispensed One Touch Verio Kit and lancets. Per pharmacist, patient needs One Touch Verio test strips. Test strips sent to pharmacy and patient informed through [employer].

## 2019-10-04 ENCOUNTER — APPOINTMENT (OUTPATIENT)
Dept: ENDOCRINOLOGY | Facility: HOSPITAL | Age: 39
End: 2019-10-04
Attending: FAMILY MEDICINE
Payer: COMMERCIAL

## 2019-10-09 RX ORDER — MONTELUKAST SODIUM 10 MG/1
TABLET ORAL
Qty: 90 TABLET | Refills: 0 | OUTPATIENT
Start: 2019-10-09

## 2019-10-09 NOTE — TELEPHONE ENCOUNTER
Lyle Medrano from [facility] [employer] stated patient needs a refill on MONTELUKAST SODIUM 10 MG Oral Tab for a 90 day supply up to 3 refills. Please fax order to #[phone]    Thank you.

## 2019-10-09 NOTE — TELEPHONE ENCOUNTER
Refill requested for   MONTELUKAST SODIUM 10 MG Oral Tab 30 tablet 1 9/3/2019    Sig: Gabrielle Seadaysi 1 TABLET BY MOUTH EVERY DAY EVERY NIGHT         Last office visit: 12/17/18    Previously advised to follow up in:  6-12 months    F/U currently scheduled?  10/12/1

## 2019-10-11 ENCOUNTER — OFFICE VISIT (OUTPATIENT)
Dept: FAMILY MEDICINE CLINIC | Facility: CLINIC | Age: 39
End: 2019-10-11
Payer: COMMERCIAL

## 2019-10-11 VITALS
HEART RATE: 70 BPM | WEIGHT: 186 LBS | HEIGHT: 62 IN | DIASTOLIC BLOOD PRESSURE: 80 MMHG | SYSTOLIC BLOOD PRESSURE: 125 MMHG | BODY MASS INDEX: 34.23 KG/M2

## 2019-10-11 DIAGNOSIS — F41.0 PANIC ANXIETY SYNDROME: ICD-10-CM

## 2019-10-11 DIAGNOSIS — R00.2 PALPITATIONS: Primary | ICD-10-CM

## 2019-10-11 PROCEDURE — 99214 OFFICE O/P EST MOD 30 MIN: CPT | Performed by: FAMILY MEDICINE

## 2019-10-11 NOTE — PROGRESS NOTES
Blood pressure 125/80, pulse 70, height 5' 2\" (1.575 m), weight 186 lb (84.4 kg), last menstrual period 06/29/2019, not currently breastfeeding. Following up for anxiety with tachycardia.   She reports that she has noticed her pulse gets very fast at ti

## 2019-10-12 ENCOUNTER — OFFICE VISIT (OUTPATIENT)
Dept: ALLERGY | Facility: CLINIC | Age: 39
End: 2019-10-12
Payer: COMMERCIAL

## 2019-10-12 VITALS
HEART RATE: 76 BPM | TEMPERATURE: 98 F | RESPIRATION RATE: 18 BRPM | OXYGEN SATURATION: 99 % | DIASTOLIC BLOOD PRESSURE: 81 MMHG | SYSTOLIC BLOOD PRESSURE: 122 MMHG

## 2019-10-12 DIAGNOSIS — J30.1 SEASONAL ALLERGIC RHINITIS DUE TO POLLEN: ICD-10-CM

## 2019-10-12 DIAGNOSIS — Z23 FLU VACCINE NEED: ICD-10-CM

## 2019-10-12 DIAGNOSIS — J45.20 MILD INTERMITTENT ASTHMA WITHOUT COMPLICATION: Primary | ICD-10-CM

## 2019-10-12 DIAGNOSIS — Z91.09 ALLERGY TO AMERICAN HOUSE DUST MITE: ICD-10-CM

## 2019-10-12 PROCEDURE — 99214 OFFICE O/P EST MOD 30 MIN: CPT | Performed by: ALLERGY & IMMUNOLOGY

## 2019-10-12 PROCEDURE — 94010 BREATHING CAPACITY TEST: CPT | Performed by: ALLERGY & IMMUNOLOGY

## 2019-10-12 RX ORDER — MONTELUKAST SODIUM 10 MG/1
10 TABLET ORAL NIGHTLY
Qty: 90 TABLET | Refills: 1 | Status: SHIPPED | OUTPATIENT
Start: 2019-10-12 | End: 2019-10-31

## 2019-10-12 NOTE — PROGRESS NOTES
April Princess Guevara is a 41year old female. HPI:   Patient presents with:  Asthma: Asthma is good. Pt denies any asthma flares. No prednisone or ER visits for asthma.  Pt reports 3 weeks ago Metoprolol was started for blood pressure, and the last couple wee Laterality Date   • HYSTEROSCOPY DIAGNOSTIC N/A 4/25/2018    Performed by Lukas Broderick MD at [facility] OR   • LAPAROTOMY MYOMECTOMY N/A 7/24/2019    Performed by Lukas Broderick MD at [facility] OR   • OTHER  04/2018    Surgical IUD removal       Fam sprays by Nasal route daily. , Disp: 1 Bottle, Rfl: 5  ibuprofen 600 MG Oral Tab, Take 1 tablet (600 mg total) by mouth every 6 (six) hours as needed for Pain., Disp: 30 tablet, Rfl: 0  HYDROcodone-acetaminophen 5-325 MG Oral Tab, Take 1 tablet by mouth bunny regular rate and rhythm no murmurs, gallups, or rubs  Abdomen: soft non-tender non-distended  Skin/Hair: no unusual rashes present   Extremities: no edema, cyanosis, or clubbing     ASSESSMENT/PLAN:   Assessment   Mild intermittent asthma without complicat

## 2019-10-16 ENCOUNTER — NURSE ONLY (OUTPATIENT)
Dept: ALLERGY | Facility: CLINIC | Age: 39
End: 2019-10-16
Payer: COMMERCIAL

## 2019-10-16 DIAGNOSIS — J30.89 ENVIRONMENTAL AND SEASONAL ALLERGIES: ICD-10-CM

## 2019-10-16 PROCEDURE — 95117 IMMUNOTHERAPY INJECTIONS: CPT | Performed by: ALLERGY & IMMUNOLOGY

## 2019-10-18 ENCOUNTER — HOSPITAL ENCOUNTER (OUTPATIENT)
Dept: CV DIAGNOSTICS | Facility: HOSPITAL | Age: 39
Discharge: HOME | End: 2019-10-18
Attending: FAMILY MEDICINE
Payer: COMMERCIAL

## 2019-10-18 DIAGNOSIS — R00.2 PALPITATIONS: ICD-10-CM

## 2019-10-18 PROCEDURE — 93225 XTRNL ECG REC<48 HRS REC: CPT | Performed by: FAMILY MEDICINE

## 2019-10-18 PROCEDURE — 93227 XTRNL ECG REC<48 HR R&I: CPT | Performed by: FAMILY MEDICINE

## 2019-10-22 RX ORDER — MELATONIN
325 2 TIMES DAILY WITH MEALS
Qty: 60 TABLET | Refills: 1 | Status: SHIPPED | OUTPATIENT
Start: 2019-10-22 | End: 2019-12-12

## 2019-10-31 RX ORDER — MONTELUKAST SODIUM 10 MG/1
TABLET ORAL
Qty: 90 TABLET | Refills: 1 | Status: SHIPPED | OUTPATIENT
Start: 2019-10-31 | End: 2020-03-17

## 2019-10-31 RX ORDER — METOPROLOL SUCCINATE 50 MG/1
TABLET, EXTENDED RELEASE ORAL
Qty: 90 TABLET | Refills: 1 | Status: SHIPPED | OUTPATIENT
Start: 2019-10-31 | End: 2019-12-27

## 2019-11-04 RX ORDER — ALBUTEROL SULFATE 90 UG/1
POWDER, METERED RESPIRATORY (INHALATION)
Qty: 1 EACH | Refills: 0 | Status: SHIPPED | OUTPATIENT
Start: 2019-11-04

## 2019-11-05 NOTE — TELEPHONE ENCOUNTER
Refill requested for    Name from pharmacy: [facility] [address]         Will file in chart as: PROAIR RESPICLICK 357 (90 Base) MCG/ACT Inhalation Aerosol Powder, Breath Activated         Sig: INHALE 2 PUFFS INTO THE LUNGS EVERY 4 TO 6 HOURS AS N

## 2019-11-21 ENCOUNTER — PATIENT MESSAGE (OUTPATIENT)
Dept: ALLERGY | Facility: CLINIC | Age: 39
End: 2019-11-21

## 2019-11-21 NOTE — TELEPHONE ENCOUNTER
From: Arlette Collins  To: Angie Mae MD  Sent: 11/21/2019 12:24 PM CST  Subject: Other    Hello,     Are you guys in the office this Saturday? I wanted to get my allergy shots.

## 2019-11-25 ENCOUNTER — NURSE ONLY (OUTPATIENT)
Dept: ALLERGY | Facility: CLINIC | Age: 39
End: 2019-11-25
Payer: COMMERCIAL

## 2019-11-25 DIAGNOSIS — J30.89 ENVIRONMENTAL AND SEASONAL ALLERGIES: ICD-10-CM

## 2019-11-25 PROCEDURE — 95117 IMMUNOTHERAPY INJECTIONS: CPT | Performed by: ALLERGY & IMMUNOLOGY

## 2019-12-02 ENCOUNTER — NURSE ONLY (OUTPATIENT)
Dept: ALLERGY | Facility: CLINIC | Age: 39
End: 2019-12-02
Payer: COMMERCIAL

## 2019-12-02 DIAGNOSIS — J30.89 ENVIRONMENTAL AND SEASONAL ALLERGIES: ICD-10-CM

## 2019-12-02 PROCEDURE — 95117 IMMUNOTHERAPY INJECTIONS: CPT | Performed by: ALLERGY & IMMUNOLOGY

## 2019-12-02 PROCEDURE — 95165 ANTIGEN THERAPY SERVICES: CPT | Performed by: ALLERGY & IMMUNOLOGY

## 2019-12-12 RX ORDER — MELATONIN
Qty: 60 TABLET | Refills: 1 | Status: SHIPPED | OUTPATIENT
Start: 2019-12-12 | End: 2020-04-10

## 2019-12-27 NOTE — TELEPHONE ENCOUNTER
90 refill request on following.     Current Outpatient Medications   Medication Sig Dispense Refill   •       •       •       • METOPROLOL SUCCINATE ER 50 MG Oral Tablet 24 Hr TAKE 1 TABLET BY MOUTH EVERY DAY 90 tablet 1   • FLUoxetine HCl 20 MG Oral Cap Ta

## 2019-12-28 NOTE — TELEPHONE ENCOUNTER
[employer] message sent. RN=call and clarify pharmacy: [facility] and still with available refills.

## 2019-12-30 RX ORDER — LANCETS
EACH MISCELLANEOUS
Qty: 100 EACH | Refills: 1 | Status: SHIPPED | OUTPATIENT
Start: 2019-12-30

## 2019-12-30 RX ORDER — METOPROLOL SUCCINATE 50 MG/1
50 TABLET, EXTENDED RELEASE ORAL
Qty: 90 TABLET | Refills: 1 | Status: SHIPPED | OUTPATIENT
Start: 2019-12-30 | End: 2020-06-04

## 2019-12-30 NOTE — TELEPHONE ENCOUNTER
Spoke with patient request refills sent to [facility] mail order.        Hypertensive Medications  Protocol Criteria:  · Appointment scheduled in the past 6 months or in the next 3 months  · BMP or CMP in the past 12 months  · Creatinine result < 2  Rec

## 2019-12-30 NOTE — TELEPHONE ENCOUNTER
Refill passed per [facility], [facility] protocol.     Severe drug interaction with fluoxetine and Ibuprofen   Unable to refill

## 2020-01-02 RX ORDER — FLUOXETINE HYDROCHLORIDE 20 MG/1
20 CAPSULE ORAL DAILY
Qty: 90 CAPSULE | Refills: 1 | Status: SHIPPED | OUTPATIENT
Start: 2020-01-02 | End: 2020-06-08

## 2020-01-03 ENCOUNTER — PATIENT MESSAGE (OUTPATIENT)
Dept: FAMILY MEDICINE CLINIC | Facility: CLINIC | Age: 40
End: 2020-01-03

## 2020-01-03 NOTE — TELEPHONE ENCOUNTER
From: Arlette Collins  To: Feli Lee. DO Kisha  Sent: 1/3/2020 1:05 PM CST  Subject: Other    Sukumar Taylor,    I have developed a rash on my abdomen. Is it possible that it is from one of my medications?

## 2020-01-08 ENCOUNTER — HOSPITAL ENCOUNTER (EMERGENCY)
Facility: HOSPITAL | Age: 40
Discharge: HOME | End: 2020-01-08
Attending: EMERGENCY MEDICINE
Payer: COMMERCIAL

## 2020-01-08 VITALS
BODY MASS INDEX: 33.13 KG/M2 | OXYGEN SATURATION: 100 % | DIASTOLIC BLOOD PRESSURE: 80 MMHG | HEART RATE: 65 BPM | HEIGHT: 62 IN | TEMPERATURE: 97 F | RESPIRATION RATE: 16 BRPM | WEIGHT: 180 LBS | SYSTOLIC BLOOD PRESSURE: 135 MMHG

## 2020-01-08 DIAGNOSIS — M54.50 LUMBAR BACK PAIN: ICD-10-CM

## 2020-01-08 DIAGNOSIS — M54.32 SCIATICA OF LEFT SIDE: Primary | ICD-10-CM

## 2020-01-08 LAB
BILIRUB UR QL STRIP.AUTO: NEGATIVE
CLARITY UR REFRACT.AUTO: CLEAR
COLOR UR AUTO: YELLOW
GLUCOSE UR STRIP.AUTO-MCNC: 50 MG/DL
KETONES UR STRIP.AUTO-MCNC: NEGATIVE MG/DL
LEUKOCYTE ESTERASE UR QL STRIP.AUTO: NEGATIVE
NITRITE UR QL STRIP.AUTO: NEGATIVE
PH UR STRIP.AUTO: 6 [PH] (ref 4.5–8)
PROT UR STRIP.AUTO-MCNC: NEGATIVE MG/DL
RBC UR QL AUTO: NEGATIVE
SP GR UR STRIP.AUTO: 1.02 (ref 1–1.03)
UROBILINOGEN UR STRIP.AUTO-MCNC: 2 MG/DL

## 2020-01-08 PROCEDURE — 81003 URINALYSIS AUTO W/O SCOPE: CPT

## 2020-01-08 PROCEDURE — 81003 URINALYSIS AUTO W/O SCOPE: CPT | Performed by: EMERGENCY MEDICINE

## 2020-01-08 PROCEDURE — 99283 EMERGENCY DEPT VISIT LOW MDM: CPT

## 2020-01-08 RX ORDER — NAPROXEN 250 MG/1
500 TABLET ORAL ONCE
Status: COMPLETED | OUTPATIENT
Start: 2020-01-08 | End: 2020-01-08

## 2020-01-08 RX ORDER — METAXALONE 800 MG/1
800 TABLET ORAL 3 TIMES DAILY
Qty: 12 TABLET | Refills: 0 | Status: SHIPPED | OUTPATIENT
Start: 2020-01-08 | End: 2020-01-08

## 2020-01-08 RX ORDER — METHYLPREDNISOLONE 4 MG/1
TABLET ORAL
Qty: 1 PACKAGE | Refills: 0 | Status: SHIPPED | OUTPATIENT
Start: 2020-01-08 | End: 2021-05-04 | Stop reason: ALTCHOICE

## 2020-01-08 RX ORDER — METAXALONE 800 MG/1
800 TABLET ORAL 3 TIMES DAILY
Qty: 12 TABLET | Refills: 0 | Status: SHIPPED | OUTPATIENT
Start: 2020-01-08 | End: 2021-05-04 | Stop reason: ALTCHOICE

## 2020-01-08 RX ORDER — METHYLPREDNISOLONE 4 MG/1
TABLET ORAL
Qty: 1 PACKAGE | Refills: 0 | Status: SHIPPED | OUTPATIENT
Start: 2020-01-08 | End: 2020-01-08

## 2020-01-08 RX ORDER — NAPROXEN 500 MG/1
500 TABLET ORAL 2 TIMES DAILY PRN
Qty: 14 TABLET | Refills: 0 | Status: SHIPPED | OUTPATIENT
Start: 2020-01-08 | End: 2020-01-15

## 2020-01-08 NOTE — ED INITIAL ASSESSMENT (HPI)
Pt to ED with complaints of left sided lower back pain, getting worse for past week. Pt denies any injury.

## 2020-01-08 NOTE — ED PROVIDER NOTES
Patient Seen in: [facility] Emergency Department      History   Patient presents with:  Back Pain    Stated Complaint: back pain shooting down left leg, denies known injury    HPI    April is a 31-year-old female presents today for evaluation of david Alcohol/week: 0.8 standard drinks      Types: 1 Glasses of wine per week      Comment: Rarely    Drug use:  No             Review of Systems    Positive for stated complaint: back pain shooting down left leg, denies known injury  Other systems are as noted care.        MDM   Patient is advised that there is likely a sciatic component, although this does not appear to be true sciatica. She does have musculoskeletal low back pain without true midline tenderness.   She denies any specific blunt force trauma or 3 (three) times daily.  Start 1/11 if naproxen ineffective., Normal, Disp-12 tablet, R-0    methylPREDNISolone 4 MG Oral Tablet Therapy Pack  Dispense as dose pack., Normal, Disp-1 Package, R-0

## 2020-01-20 ENCOUNTER — NURSE ONLY (OUTPATIENT)
Dept: ALLERGY | Facility: CLINIC | Age: 40
End: 2020-01-20
Payer: COMMERCIAL

## 2020-01-20 DIAGNOSIS — J30.89 ENVIRONMENTAL AND SEASONAL ALLERGIES: ICD-10-CM

## 2020-01-20 PROCEDURE — 95117 IMMUNOTHERAPY INJECTIONS: CPT | Performed by: ALLERGY & IMMUNOLOGY

## 2020-01-27 ENCOUNTER — NURSE ONLY (OUTPATIENT)
Dept: ALLERGY | Facility: CLINIC | Age: 40
End: 2020-01-27
Payer: COMMERCIAL

## 2020-01-27 DIAGNOSIS — J30.89 ENVIRONMENTAL AND SEASONAL ALLERGIES: ICD-10-CM

## 2020-01-27 PROCEDURE — 95117 IMMUNOTHERAPY INJECTIONS: CPT | Performed by: ALLERGY & IMMUNOLOGY

## 2020-01-29 ENCOUNTER — PATIENT MESSAGE (OUTPATIENT)
Dept: FAMILY MEDICINE CLINIC | Facility: CLINIC | Age: 40
End: 2020-01-29

## 2020-01-29 NOTE — TELEPHONE ENCOUNTER
From: Arlette Collins  To: Gerhard Wetzel. DO Kisha  Sent: 1/29/2020 9:35 AM CST  Subject: Other    I have been calling for over 30 minutes and unable to get through to anyone because the automated system is not responding to my selections.  I needed to resche

## 2020-01-29 NOTE — TELEPHONE ENCOUNTER
Patient states she has been calling since yesterday to reschedule her appt but not able to get through to a live person.  OV rescheduled per patient request.

## 2020-01-31 NOTE — Clinical Note
[address] [facility] regarding a follow up appt for pt  Spoke with Eliazar who will be calling pt/spouse at home to schedule an appointment  She is to begin Levemir and sending BS to [facility] weekly for review

## 2020-02-03 ENCOUNTER — NURSE ONLY (OUTPATIENT)
Dept: ALLERGY | Facility: CLINIC | Age: 40
End: 2020-02-03
Payer: COMMERCIAL

## 2020-02-03 DIAGNOSIS — J30.89 ENVIRONMENTAL AND SEASONAL ALLERGIES: ICD-10-CM

## 2020-02-03 PROCEDURE — 95117 IMMUNOTHERAPY INJECTIONS: CPT | Performed by: ALLERGY & IMMUNOLOGY

## 2020-02-17 ENCOUNTER — NURSE ONLY (OUTPATIENT)
Dept: ALLERGY | Facility: CLINIC | Age: 40
End: 2020-02-17
Payer: COMMERCIAL

## 2020-02-17 DIAGNOSIS — J30.89 ENVIRONMENTAL AND SEASONAL ALLERGIES: ICD-10-CM

## 2020-02-17 PROCEDURE — 95117 IMMUNOTHERAPY INJECTIONS: CPT | Performed by: ALLERGY & IMMUNOLOGY

## 2020-02-19 ENCOUNTER — HOSPITAL ENCOUNTER (EMERGENCY)
Facility: HOSPITAL | Age: 40
Discharge: HOME | End: 2020-02-19
Attending: EMERGENCY MEDICINE
Payer: COMMERCIAL

## 2020-02-19 VITALS
DIASTOLIC BLOOD PRESSURE: 104 MMHG | HEART RATE: 99 BPM | TEMPERATURE: 99 F | RESPIRATION RATE: 20 BRPM | OXYGEN SATURATION: 100 % | SYSTOLIC BLOOD PRESSURE: 166 MMHG

## 2020-02-19 DIAGNOSIS — J10.1 INFLUENZA A: Primary | ICD-10-CM

## 2020-02-19 LAB
FLUAV + FLUBV RNA SPEC NAA+PROBE: NEGATIVE
FLUAV + FLUBV RNA SPEC NAA+PROBE: NEGATIVE
FLUAV + FLUBV RNA SPEC NAA+PROBE: POSITIVE

## 2020-02-19 PROCEDURE — 99283 EMERGENCY DEPT VISIT LOW MDM: CPT

## 2020-02-19 PROCEDURE — 87631 RESP VIRUS 3-5 TARGETS: CPT

## 2020-02-19 PROCEDURE — 87631 RESP VIRUS 3-5 TARGETS: CPT | Performed by: EMERGENCY MEDICINE

## 2020-02-19 RX ORDER — OSELTAMIVIR PHOSPHATE 75 MG/1
75 CAPSULE ORAL 2 TIMES DAILY
Qty: 10 CAPSULE | Refills: 0 | Status: SHIPPED | OUTPATIENT
Start: 2020-02-19 | End: 2021-05-04 | Stop reason: ALTCHOICE

## 2020-02-19 RX ORDER — OSELTAMIVIR PHOSPHATE 75 MG/1
75 CAPSULE ORAL ONCE
Status: COMPLETED | OUTPATIENT
Start: 2020-02-19 | End: 2020-02-19

## 2020-02-20 NOTE — ED PROVIDER NOTES
Patient Seen in: [facility] AND [facility] Emergency Department    History   Patient presents with:   Body ache and/or chills  Cough/URI    Stated Complaint: coughing/bodyache/chills    HPI    Patient started yesterday with feeling a bit achy and a bit under the MW and at bedtime TuTh Sat Sun   FERROUS SULFATE 325 (65 FE) MG Oral Tab EC,  TAKE 1 TABLET BY MOUTH 2 TIMES DAILY WITH MEALS.    PROAIR RESPICLICK 086 (90 Base) MCG/ACT Inhalation Aerosol Powder, Breath Activated,  INHALE 2 PUFFS INTO THE LUNGS EVERY 4 TO Temp 99.4 °F (37.4 °C)   Temp src    SpO2 99 %   O2 Device None (Room air)       Current:BP (!) 166/104   Pulse 101   Temp 99.4 °F (37.4 °C)   Resp 20   LMP 02/03/2020   SpO2 99%         Physical Exam  Constitutional:  Alert, well nourished adult lying i 1 capsule (75 mg total) by mouth 2 (two) times daily.   Qty: 10 capsule Refills: 0

## 2020-03-02 ENCOUNTER — NURSE ONLY (OUTPATIENT)
Dept: ALLERGY | Facility: CLINIC | Age: 40
End: 2020-03-02
Payer: COMMERCIAL

## 2020-03-02 DIAGNOSIS — J30.89 ENVIRONMENTAL AND SEASONAL ALLERGIES: ICD-10-CM

## 2020-03-02 PROCEDURE — 95117 IMMUNOTHERAPY INJECTIONS: CPT | Performed by: ALLERGY & IMMUNOLOGY

## 2020-03-02 PROCEDURE — 95165 ANTIGEN THERAPY SERVICES: CPT | Performed by: ALLERGY & IMMUNOLOGY

## 2020-03-17 RX ORDER — MONTELUKAST SODIUM 10 MG/1
TABLET ORAL
Qty: 90 TABLET | Refills: 0 | Status: SHIPPED | OUTPATIENT
Start: 2020-03-17 | End: 2020-06-15

## 2020-03-17 NOTE — TELEPHONE ENCOUNTER
Pt last seen in Allergy for physician visit on 10/12/2019 for .  . .    Mild intermittent asthma without complication  (primary encounter diagnosis)  Allergy to americ[employer] house dust mite  Seasonal allergic rhinitis due to pollen  Flu vaccine need    Refill r

## 2020-03-19 ENCOUNTER — TELEPHONE (OUTPATIENT)
Dept: OBGYN CLINIC | Facility: CLINIC | Age: 40
End: 2020-03-19

## 2020-03-19 NOTE — TELEPHONE ENCOUNTER
Pt who was scheduled with MLM for office visit tomorrow with pos travel screen  - possible exposure to covid 19. Pt reports close contact with friend who cared for pt with confirmed case of covid19. Pt currently under self quarrantine (day 2).  Denies resp

## 2020-03-25 ENCOUNTER — TELEPHONE (OUTPATIENT)
Dept: ALLERGY | Facility: CLINIC | Age: 40
End: 2020-03-25

## 2020-03-25 NOTE — TELEPHONE ENCOUNTER
Patient returned call. Scheduled next injection for:  3/30/20 at 0920. Questions answered. At this time, we are continuing to do injections. Our shot hours now end at 5:30pm on Mondays and Tuesdays.   The rest of the shot hours and scheduled Saturda

## 2020-03-25 NOTE — TELEPHONE ENCOUNTER
Left a message for patient to contact our office regarding allergy injections. Patient will need to schedule allergy shots and that our office will be closing at 6 pm therefore the last injections will be 5:30 pm on Mondays and Tuesdays.

## 2020-03-30 ENCOUNTER — NURSE ONLY (OUTPATIENT)
Dept: ALLERGY | Facility: CLINIC | Age: 40
End: 2020-03-30
Payer: COMMERCIAL

## 2020-03-30 DIAGNOSIS — J30.89 ENVIRONMENTAL AND SEASONAL ALLERGIES: ICD-10-CM

## 2020-03-30 PROCEDURE — 95117 IMMUNOTHERAPY INJECTIONS: CPT | Performed by: ALLERGY & IMMUNOLOGY

## 2020-04-10 RX ORDER — MELATONIN
Qty: 60 TABLET | Refills: 1 | Status: SHIPPED | OUTPATIENT
Start: 2020-04-10 | End: 2020-07-29

## 2020-05-04 ENCOUNTER — TELEPHONE (OUTPATIENT)
Dept: ALLERGY | Facility: CLINIC | Age: 40
End: 2020-05-04

## 2020-05-04 NOTE — TELEPHONE ENCOUNTER
RN reached out to patient because we had her scheduled for an injection appointment for this morning. Pt reports she was laid off, and she is in the process of signing up for [employer].  Pt thinks she has [employer], but she does not have any i

## 2020-05-04 NOTE — TELEPHONE ENCOUNTER
Spoke to billing office, Patricia Hull. She reports self pay patients are given a 25% discount. The charged prices for [address] is $38, discounted to $28.50. Serum code 51430 is $21/unit. Discounted to $15.75/unit.      Patient would receive 2 units of serum

## 2020-05-05 NOTE — TELEPHONE ENCOUNTER
Spoke to April. Informed her of the pricing I received from the Billing department. April verbalizes her understanding, and will continue to look into changing to [employer]. No further action needed on our end.

## 2020-05-11 ENCOUNTER — NURSE ONLY (OUTPATIENT)
Dept: ALLERGY | Facility: CLINIC | Age: 40
End: 2020-05-11

## 2020-05-11 DIAGNOSIS — J30.89 ENVIRONMENTAL AND SEASONAL ALLERGIES: ICD-10-CM

## 2020-05-11 PROCEDURE — 95117 IMMUNOTHERAPY INJECTIONS: CPT | Performed by: ALLERGY & IMMUNOLOGY

## 2020-06-04 RX ORDER — METOPROLOL SUCCINATE 50 MG/1
TABLET, EXTENDED RELEASE ORAL
Qty: 90 TABLET | Refills: 0 | Status: SHIPPED | OUTPATIENT
Start: 2020-06-04 | End: 2020-10-26

## 2020-06-08 ENCOUNTER — NURSE ONLY (OUTPATIENT)
Dept: ALLERGY | Facility: CLINIC | Age: 40
End: 2020-06-08
Payer: COMMERCIAL

## 2020-06-08 DIAGNOSIS — J30.89 ENVIRONMENTAL AND SEASONAL ALLERGIES: ICD-10-CM

## 2020-06-08 PROCEDURE — 95117 IMMUNOTHERAPY INJECTIONS: CPT | Performed by: ALLERGY & IMMUNOLOGY

## 2020-06-08 RX ORDER — FLUOXETINE HYDROCHLORIDE 20 MG/1
CAPSULE ORAL
Qty: 90 CAPSULE | Refills: 0 | Status: SHIPPED | OUTPATIENT
Start: 2020-06-08 | End: 2020-08-24

## 2020-06-15 RX ORDER — MONTELUKAST SODIUM 10 MG/1
TABLET ORAL
Qty: 90 TABLET | Refills: 0 | Status: SHIPPED | OUTPATIENT
Start: 2020-06-15 | End: 2021-08-14

## 2020-06-15 NOTE — TELEPHONE ENCOUNTER
Refill requested for   MONTELUKAST SODIUM 10 MG Oral Tab 90 tablet 0 3/17/2020    Sig:   TAKE 1 TABLET NIGHTLY         Last office visit: 10/12/19    Previously advised to follow up in:  6 months    F/U currently scheduled?  7/6/20      Appointment with can

## 2020-07-29 RX ORDER — MELATONIN
Qty: 60 TABLET | Refills: 1 | Status: SHIPPED | OUTPATIENT
Start: 2020-07-29 | End: 2020-12-18

## 2020-08-24 RX ORDER — FLUOXETINE HYDROCHLORIDE 20 MG/1
CAPSULE ORAL
Qty: 30 CAPSULE | Refills: 0 | Status: SHIPPED | OUTPATIENT
Start: 2020-08-24 | End: 2020-09-28

## 2020-09-27 RX ORDER — FLUOXETINE HYDROCHLORIDE 20 MG/1
CAPSULE ORAL
Qty: 30 CAPSULE | Refills: 0 | OUTPATIENT
Start: 2020-09-27

## 2020-09-28 ENCOUNTER — PATIENT MESSAGE (OUTPATIENT)
Dept: FAMILY MEDICINE CLINIC | Facility: CLINIC | Age: 40
End: 2020-09-28

## 2020-09-29 RX ORDER — FLUOXETINE HYDROCHLORIDE 20 MG/1
CAPSULE ORAL
Qty: 30 CAPSULE | Refills: 0 | Status: SHIPPED | OUTPATIENT
Start: 2020-09-29 | End: 2020-10-26

## 2020-09-29 RX ORDER — FLUOXETINE HYDROCHLORIDE 20 MG/1
20 CAPSULE ORAL EVERY 6 HOURS SCHEDULED
Qty: 30 CAPSULE | Refills: 0 | Status: ON HOLD | OUTPATIENT
Start: 2020-09-29 | End: 2021-12-14

## 2020-09-29 NOTE — TELEPHONE ENCOUNTER
From: Arlette Collins  To: Nahomy Beard DO  Sent: 9/28/2020 2:16 PM CDT  Subject: Prescription Question    Hi Dr Lawrence Chakraborty,     I tried reaching out to a new doctor to see going forward.  Unfortunately my insurance is being changed as of October first.

## 2020-09-29 NOTE — TELEPHONE ENCOUNTER
Routed to Dr Bijan Ramos for advise, thanks.       Requested Prescriptions     Pending Prescriptions Disp Refills   • FLUoxetine HCl 20 MG Oral Cap 30 capsule 0     Sig: Take one capsule  daily       Last Office Visit with PCP: 10/11/2019    No future appointm

## 2020-10-26 RX ORDER — METOPROLOL SUCCINATE 50 MG/1
50 TABLET, EXTENDED RELEASE ORAL DAILY
Qty: 30 TABLET | Refills: 0 | Status: SHIPPED | OUTPATIENT
Start: 2020-10-26 | End: 2020-11-30

## 2020-10-26 RX ORDER — FLUOXETINE HYDROCHLORIDE 20 MG/1
CAPSULE ORAL
Qty: 30 CAPSULE | Refills: 0 | Status: SHIPPED | OUTPATIENT
Start: 2020-10-26 | End: 2020-11-30

## 2020-11-30 RX ORDER — FLUOXETINE HYDROCHLORIDE 20 MG/1
CAPSULE ORAL
Qty: 30 CAPSULE | Refills: 0 | Status: SHIPPED | OUTPATIENT
Start: 2020-11-30 | End: 2020-12-31

## 2020-11-30 RX ORDER — METOPROLOL SUCCINATE 50 MG/1
TABLET, EXTENDED RELEASE ORAL
Qty: 30 TABLET | Refills: 0 | Status: SHIPPED | OUTPATIENT
Start: 2020-11-30 | End: 2020-12-31

## 2020-12-18 RX ORDER — MELATONIN
Qty: 60 TABLET | Refills: 1 | Status: SHIPPED | OUTPATIENT
Start: 2020-12-18 | End: 2021-05-12

## 2020-12-31 RX ORDER — METOPROLOL SUCCINATE 50 MG/1
50 TABLET, EXTENDED RELEASE ORAL DAILY
Qty: 30 TABLET | Refills: 0 | Status: SHIPPED | OUTPATIENT
Start: 2020-12-31 | End: 2021-01-25

## 2020-12-31 RX ORDER — FLUOXETINE HYDROCHLORIDE 20 MG/1
CAPSULE ORAL
Qty: 30 CAPSULE | Refills: 0 | Status: SHIPPED | OUTPATIENT
Start: 2020-12-31 | End: 2021-01-25

## 2021-01-25 RX ORDER — METOPROLOL SUCCINATE 50 MG/1
TABLET, EXTENDED RELEASE ORAL
Qty: 30 TABLET | Refills: 0 | Status: SHIPPED | OUTPATIENT
Start: 2021-01-25 | End: 2021-08-14

## 2021-01-25 RX ORDER — FLUOXETINE HYDROCHLORIDE 20 MG/1
CAPSULE ORAL
Qty: 30 CAPSULE | Refills: 0 | Status: SHIPPED | OUTPATIENT
Start: 2021-01-25 | End: 2021-05-04 | Stop reason: ALTCHOICE

## 2021-01-25 NOTE — TELEPHONE ENCOUNTER
rx canceled per [facility] [place], pt has not been seen in office since 2019, INS no longer covers, needs new PCP

## 2021-01-29 ENCOUNTER — TELEPHONE (OUTPATIENT)
Dept: ALLERGY | Facility: CLINIC | Age: 41
End: 2021-01-29

## 2021-01-30 NOTE — TELEPHONE ENCOUNTER
Refill request denied.   Patient last seen in October 2019 and will require follow-up appointment for refills

## 2021-01-30 NOTE — TELEPHONE ENCOUNTER
Refill requested for:  MONTELUKAST SODIUM 10 MG Oral Tab, TAKE 1 TABLET NIGHTLY, Disp: 90 tablet, Rfl: 0     Last office visit: 10/12/2019    Previously advised to follow up in: 6 months or sooner if needed    F/U currently scheduled?  No    Date of last re

## 2021-01-30 NOTE — TELEPHONE ENCOUNTER
Patient reports she has new insurance and that is why she has not been to office--she is unsure if she is covered to see Dr. Doc Kawasaki.     She said she has [address] Medicaid--RN advised she call her insurance so they can tell her if she is covered t

## 2021-02-03 NOTE — TELEPHONE ENCOUNTER
Pt threatening staff and stating \"I am going to punch you in the face. \"      Lorna Stratton RN  02/02/21 6060 •  Glucose Blood (ONETOUCH ULTRA BLUE) In Vitro Strip, check sugar fasting MWF and at bedtime Koko Phillips, Disp: 100 strip, Rfl: 3

## 2021-02-05 DIAGNOSIS — Z23 NEED FOR VACCINATION: ICD-10-CM

## 2021-03-05 ENCOUNTER — TELEPHONE (OUTPATIENT)
Dept: FAMILY MEDICINE CLINIC | Facility: CLINIC | Age: 41
End: 2021-03-05

## 2021-03-05 NOTE — TELEPHONE ENCOUNTER
Pt has new INS not accepted with [facility] PPD,can no longer provide refills, pt has been made aware several times

## 2021-03-09 ENCOUNTER — IMMUNIZATION (OUTPATIENT)
Dept: LAB | Facility: HOSPITAL | Age: 41
End: 2021-03-09
Attending: HOSPITALIST
Payer: MEDICAID

## 2021-03-09 DIAGNOSIS — Z23 NEED FOR VACCINATION: Primary | ICD-10-CM

## 2021-03-09 PROCEDURE — 0011A SARSCOV2 VAC 100MCG/0.5ML IM: CPT

## 2021-04-06 ENCOUNTER — IMMUNIZATION (OUTPATIENT)
Dept: LAB | Facility: HOSPITAL | Age: 41
End: 2021-04-06
Attending: EMERGENCY MEDICINE
Payer: COMMERCIAL

## 2021-04-06 DIAGNOSIS — Z23 NEED FOR VACCINATION: Primary | ICD-10-CM

## 2021-04-06 PROCEDURE — 0012A SARSCOV2 VAC 100MCG/0.5ML IM: CPT

## 2021-04-20 ENCOUNTER — OFFICE VISIT (OUTPATIENT)
Dept: OBGYN CLINIC | Facility: CLINIC | Age: 41
End: 2021-04-20
Payer: COMMERCIAL

## 2021-04-20 ENCOUNTER — HOSPITAL ENCOUNTER (OUTPATIENT)
Dept: ULTRASOUND IMAGING | Facility: HOSPITAL | Age: 41
Discharge: HOME | End: 2021-04-20
Attending: OBSTETRICS & GYNECOLOGY
Payer: COMMERCIAL

## 2021-04-20 VITALS — BODY MASS INDEX: 33 KG/M2 | SYSTOLIC BLOOD PRESSURE: 118 MMHG | DIASTOLIC BLOOD PRESSURE: 66 MMHG | WEIGHT: 181 LBS

## 2021-04-20 DIAGNOSIS — Z87.59 HISTORY OF ECTOPIC PREGNANCY: Primary | ICD-10-CM

## 2021-04-20 DIAGNOSIS — Z87.59 HISTORY OF ECTOPIC PREGNANCY: ICD-10-CM

## 2021-04-20 DIAGNOSIS — N92.6 MISSED MENSES: ICD-10-CM

## 2021-04-20 PROBLEM — O09.521 MULTIGRAVIDA OF ADVANCED MATERNAL AGE IN FIRST TRIMESTER: Status: ACTIVE | Noted: 2021-04-20

## 2021-04-20 PROBLEM — O09.521 MULTIGRAVIDA OF ADVANCED MATERNAL AGE IN FIRST TRIMESTER (HCC): Status: ACTIVE | Noted: 2021-04-20

## 2021-04-20 PROCEDURE — 76801 OB US < 14 WKS SINGLE FETUS: CPT | Performed by: OBSTETRICS & GYNECOLOGY

## 2021-04-20 PROCEDURE — 3078F DIAST BP <80 MM HG: CPT | Performed by: OBSTETRICS & GYNECOLOGY

## 2021-04-20 PROCEDURE — 99213 OFFICE O/P EST LOW 20 MIN: CPT | Performed by: OBSTETRICS & GYNECOLOGY

## 2021-04-20 PROCEDURE — 76817 TRANSVAGINAL US OBSTETRIC: CPT | Performed by: OBSTETRICS & GYNECOLOGY

## 2021-04-20 PROCEDURE — 3074F SYST BP LT 130 MM HG: CPT | Performed by: OBSTETRICS & GYNECOLOGY

## 2021-04-20 NOTE — PROGRESS NOTES
HPI/Subjective:   Patient ID: Arlette Del Rosario is a 47year old female. Patient here for PCV. H/O Ectopic pregnancy in 2019 and treated with Methotrexate. Discussed going for Hold and Call ultrasound today to confirm location of pregnancy.   Patient is TuTaidee Sat Sun 100 each 1   • PROAIR RESPICLICK 547 (90 Base) MCG/ACT Inhalation Aerosol Powder, Breath Activated INHALE 2 PUFFS INTO THE LUNGS EVERY 4 TO 6 HOURS AS NEEDED.  1 each 0   • Glucose Blood (ONETOUCH VERIO) In Vitro Strip check sugar fasting MWF a note reviewed. Constitutional:       Appearance: Normal appearance. She is normal weight. Skin:     General: Skin is warm and dry. Neurological:      General: No focal deficit present.       Mental Status: She is alert and oriented to person, place, a

## 2021-05-04 ENCOUNTER — NURSE ONLY (OUTPATIENT)
Dept: OBGYN CLINIC | Facility: CLINIC | Age: 41
End: 2021-05-04
Payer: COMMERCIAL

## 2021-05-04 DIAGNOSIS — Z34.01 ENCOUNTER FOR SUPERVISION OF NORMAL FIRST PREGNANCY IN FIRST TRIMESTER: Primary | ICD-10-CM

## 2021-05-04 DIAGNOSIS — O24.111 TYPE 2 DIABETES MELLITUS AFFECTING PREGNANCY IN FIRST TRIMESTER, ANTEPARTUM: ICD-10-CM

## 2021-05-04 DIAGNOSIS — O10.919 HTN IN PREGNANCY, CHRONIC: ICD-10-CM

## 2021-05-04 RX ORDER — CHOLECALCIFEROL (VITAMIN D3) 25 MCG
1 TABLET,CHEWABLE ORAL DAILY
COMMUNITY

## 2021-05-04 NOTE — PROGRESS NOTES
Pt is a  with EDC of 21 based on LMP of 21 and first trimester ultrasound on 21. PT DOES NOT WANT PARTNER TO KNOW BOUT ECTOPIC PREGNANCY. Med Hx- H/O Ectopic pregnancy in 2019, H/O Multiple Myomectomies in 2019.    AMA, Chronic Hyp

## 2021-05-12 RX ORDER — MELATONIN
Qty: 60 TABLET | Refills: 1 | Status: SHIPPED | OUTPATIENT
Start: 2021-05-12 | End: 2021-11-25

## 2021-05-15 ENCOUNTER — LAB ENCOUNTER (OUTPATIENT)
Dept: LAB | Age: 41
End: 2021-05-15
Attending: OBSTETRICS & GYNECOLOGY
Payer: COMMERCIAL

## 2021-05-15 DIAGNOSIS — O10.919 HTN IN PREGNANCY, CHRONIC: ICD-10-CM

## 2021-05-15 DIAGNOSIS — Z34.01 ENCOUNTER FOR SUPERVISION OF NORMAL FIRST PREGNANCY IN FIRST TRIMESTER: ICD-10-CM

## 2021-05-15 DIAGNOSIS — O24.111 TYPE 2 DIABETES MELLITUS AFFECTING PREGNANCY IN FIRST TRIMESTER, ANTEPARTUM: ICD-10-CM

## 2021-05-15 PROCEDURE — 82570 ASSAY OF URINE CREATININE: CPT

## 2021-05-15 PROCEDURE — 84156 ASSAY OF PROTEIN URINE: CPT

## 2021-05-15 PROCEDURE — 86778 TOXOPLASMA ANTIBODY IGM: CPT

## 2021-05-15 PROCEDURE — 86777 TOXOPLASMA ANTIBODY: CPT

## 2021-05-15 PROCEDURE — 86850 RBC ANTIBODY SCREEN: CPT

## 2021-05-15 PROCEDURE — 87340 HEPATITIS B SURFACE AG IA: CPT

## 2021-05-15 PROCEDURE — 36415 COLL VENOUS BLD VENIPUNCTURE: CPT

## 2021-05-15 PROCEDURE — 80053 COMPREHEN METABOLIC PANEL: CPT

## 2021-05-15 PROCEDURE — 86900 BLOOD TYPING SEROLOGIC ABO: CPT

## 2021-05-15 PROCEDURE — 86901 BLOOD TYPING SEROLOGIC RH(D): CPT

## 2021-05-15 PROCEDURE — 85025 COMPLETE CBC W/AUTO DIFF WBC: CPT

## 2021-05-15 PROCEDURE — 86762 RUBELLA ANTIBODY: CPT

## 2021-05-15 PROCEDURE — 85660 RBC SICKLE CELL TEST: CPT

## 2021-05-15 PROCEDURE — 87389 HIV-1 AG W/HIV-1&-2 AB AG IA: CPT

## 2021-05-15 PROCEDURE — 86803 HEPATITIS C AB TEST: CPT

## 2021-05-15 PROCEDURE — 87086 URINE CULTURE/COLONY COUNT: CPT

## 2021-05-15 PROCEDURE — 83036 HEMOGLOBIN GLYCOSYLATED A1C: CPT

## 2021-05-15 PROCEDURE — 86780 TREPONEMA PALLIDUM: CPT

## 2021-05-17 ENCOUNTER — TELEPHONE (OUTPATIENT)
Dept: OBGYN CLINIC | Facility: CLINIC | Age: 41
End: 2021-05-17

## 2021-05-17 DIAGNOSIS — Z34.81 ENCOUNTER FOR SUPERVISION OF OTHER NORMAL PREGNANCY IN FIRST TRIMESTER: Primary | ICD-10-CM

## 2021-05-17 NOTE — TELEPHONE ENCOUNTER
----- Message from Luisa Gonzalez MD sent at 5/15/2021  8:41 PM CDT -----  HbA1C is elevated. I am not sure why it was done instead of a 1 hour GTT but Patient needs to do 1 hour GTT ASAP. Call patient.

## 2021-05-18 ENCOUNTER — TELEPHONE (OUTPATIENT)
Dept: OBGYN CLINIC | Facility: CLINIC | Age: 41
End: 2021-05-18

## 2021-05-18 NOTE — TELEPHONE ENCOUNTER
----- Message from April [address] sent at 5/18/2021  4:10 PM CDT -----  Regarding: Visit Follow-up Question  Contact: [phone],    The nurse who did my educational visit stated that she was going to write me a letter for my job.  This is just s

## 2021-05-20 ENCOUNTER — HOSPITAL ENCOUNTER (EMERGENCY)
Facility: HOSPITAL | Age: 41
Discharge: HOME | End: 2021-05-20
Payer: COMMERCIAL

## 2021-05-20 ENCOUNTER — TELEPHONE (OUTPATIENT)
Dept: OBGYN CLINIC | Facility: CLINIC | Age: 41
End: 2021-05-20

## 2021-05-20 ENCOUNTER — LAB ENCOUNTER (OUTPATIENT)
Dept: LAB | Age: 41
End: 2021-05-20
Attending: OBSTETRICS & GYNECOLOGY
Payer: COMMERCIAL

## 2021-05-20 ENCOUNTER — APPOINTMENT (OUTPATIENT)
Dept: ULTRASOUND IMAGING | Facility: HOSPITAL | Age: 41
End: 2021-05-20
Attending: NURSE PRACTITIONER
Payer: COMMERCIAL

## 2021-05-20 VITALS
WEIGHT: 183 LBS | HEIGHT: 62 IN | OXYGEN SATURATION: 100 % | SYSTOLIC BLOOD PRESSURE: 130 MMHG | TEMPERATURE: 99 F | HEART RATE: 82 BPM | BODY MASS INDEX: 33.68 KG/M2 | RESPIRATION RATE: 16 BRPM | DIASTOLIC BLOOD PRESSURE: 86 MMHG

## 2021-05-20 DIAGNOSIS — O46.8X9 SUBCHORIONIC HEMATOMA, ANTEPARTUM, SINGLE OR UNSPECIFIED FETUS: Primary | ICD-10-CM

## 2021-05-20 DIAGNOSIS — O20.0 THREATENED MISCARRIAGE IN EARLY PREGNANCY: ICD-10-CM

## 2021-05-20 DIAGNOSIS — Z34.81 ENCOUNTER FOR SUPERVISION OF OTHER NORMAL PREGNANCY IN FIRST TRIMESTER: ICD-10-CM

## 2021-05-20 DIAGNOSIS — O41.8X90 SUBCHORIONIC HEMATOMA, ANTEPARTUM, SINGLE OR UNSPECIFIED FETUS: Primary | ICD-10-CM

## 2021-05-20 PROCEDURE — 80048 BASIC METABOLIC PNL TOTAL CA: CPT | Performed by: NURSE PRACTITIONER

## 2021-05-20 PROCEDURE — 36415 COLL VENOUS BLD VENIPUNCTURE: CPT

## 2021-05-20 PROCEDURE — 84702 CHORIONIC GONADOTROPIN TEST: CPT | Performed by: NURSE PRACTITIONER

## 2021-05-20 PROCEDURE — 99284 EMERGENCY DEPT VISIT MOD MDM: CPT

## 2021-05-20 PROCEDURE — 87086 URINE CULTURE/COLONY COUNT: CPT | Performed by: NURSE PRACTITIONER

## 2021-05-20 PROCEDURE — 85025 COMPLETE CBC W/AUTO DIFF WBC: CPT | Performed by: NURSE PRACTITIONER

## 2021-05-20 PROCEDURE — 82950 GLUCOSE TEST: CPT

## 2021-05-20 PROCEDURE — 81001 URINALYSIS AUTO W/SCOPE: CPT | Performed by: NURSE PRACTITIONER

## 2021-05-20 PROCEDURE — 76801 OB US < 14 WKS SINGLE FETUS: CPT | Performed by: NURSE PRACTITIONER

## 2021-05-20 RX ORDER — NITROFURANTOIN 25; 75 MG/1; MG/1
100 CAPSULE ORAL 2 TIMES DAILY
Qty: 14 CAPSULE | Refills: 0 | Status: SHIPPED | OUTPATIENT
Start: 2021-05-20 | End: 2021-05-27

## 2021-05-20 NOTE — ED QUICK NOTES
Pt to ED per medial, suprapubic cramping with spotting. Pt c/o nausea. Hx of ectopic pregnancy. Pt is 10 weeks pregnant, previously saw her OBGYN and reports a normal 6 week US.

## 2021-05-20 NOTE — TELEPHONE ENCOUNTER
----- Message from Harsh May MD sent at 5/20/2021  2:51 PM CDT -----  Patient's 1 hour GTT is over 200. She is automatically a Pregestational Diabetic. No need to do the 3 hour GTT. [facility] to [facility] for Diabetic education. She will probably need to be on Insulin but for now we will start with trying Diet control. Patient also needs to see [facility] due to Pregestational Diabetes diagnosis. Call patient.

## 2021-05-20 NOTE — TELEPHONE ENCOUNTER
Pt weeks gestation. Was at work when she just had a sharp pain in her abdomen, and then had a gush of bright red vaginal bleeding. Advised pt she needs to go to [facility] ER for evaluation. Pt voices understanding. Pt has hx of ectopic pregnancy.  Pt had

## 2021-05-20 NOTE — ED PROVIDER NOTES
Patient Seen in: [facility] AND [facility] Emergency Department      History   Patient presents with:  Pregnancy Issues    Stated Complaint: preg issues    HPI/Subjective:   39yo/f ~ 11 weeks preg by us reports to the ED with 1 min of sharp pain followed by vagi Temp 98.7 °F (37.1 °C) (Temporal)   Resp 16   Ht 157.5 cm (5' 2\")   Wt 83 kg   LMP 03/05/2021 (Exact Date)   SpO2 100%   BMI 33.47 kg/m²         Physical Exam  Vitals and nursing note reviewed.    Constitutional:       General: She is not in acute distress components within normal limits   CBC W/ DIFFERENTIAL - Abnormal; Notable for the following components:    HGB 11.9 (*)     All other components within normal limits   CBC WITH DIFFERENTIAL WITH PLATELET    Narrative:      The following orders were created 3.2 x 3.4 cm.       MENSTRUAL AGE/FABY:   10 weeks 5 days, 12/11/2021   CRL:   4.47 cm. ULTRASOUND AGE/FABY:   11 weeks 2 days, 12/07/2020         OVARIES AND ADNEXA:     RIGHT OVARY:   Right ovary measures 4.5 x 2.4 x 4.7 cm and is unremarkable.    LEFT

## 2021-05-21 ENCOUNTER — INITIAL PRENATAL (OUTPATIENT)
Dept: OBGYN CLINIC | Facility: CLINIC | Age: 41
End: 2021-05-21
Payer: COMMERCIAL

## 2021-05-21 VITALS — DIASTOLIC BLOOD PRESSURE: 68 MMHG | WEIGHT: 185 LBS | SYSTOLIC BLOOD PRESSURE: 124 MMHG | BODY MASS INDEX: 34 KG/M2

## 2021-05-21 DIAGNOSIS — Z34.01 ENCOUNTER FOR SUPERVISION OF NORMAL FIRST PREGNANCY IN FIRST TRIMESTER: Primary | ICD-10-CM

## 2021-05-21 DIAGNOSIS — O24.111 TYPE 2 DIABETES MELLITUS AFFECTING PREGNANCY IN FIRST TRIMESTER, ANTEPARTUM: ICD-10-CM

## 2021-05-21 DIAGNOSIS — O09.521 MULTIGRAVIDA OF ADVANCED MATERNAL AGE IN FIRST TRIMESTER: ICD-10-CM

## 2021-05-21 DIAGNOSIS — O10.919 HTN IN PREGNANCY, CHRONIC: ICD-10-CM

## 2021-05-21 PROCEDURE — 3074F SYST BP LT 130 MM HG: CPT | Performed by: OBSTETRICS & GYNECOLOGY

## 2021-05-21 PROCEDURE — 81002 URINALYSIS NONAUTO W/O SCOPE: CPT | Performed by: OBSTETRICS & GYNECOLOGY

## 2021-05-21 PROCEDURE — 3078F DIAST BP <80 MM HG: CPT | Performed by: OBSTETRICS & GYNECOLOGY

## 2021-05-21 NOTE — PROGRESS NOTES
MFM appointment for AMA, Chronic Hypertension. H/O Fibroids and Multiple Myomectomies and newly diagnosed Pre-Gestational DM. GC/Chlamydia Culture Done. No active bleeding. To go to [facility] Center ASAP. Fax Log weekly.

## 2021-05-22 ENCOUNTER — TELEPHONE (OUTPATIENT)
Dept: OBGYN CLINIC | Facility: CLINIC | Age: 41
End: 2021-05-22

## 2021-05-22 NOTE — TELEPHONE ENCOUNTER
Pt seen yesterday for PNV with mlm. All information was given to this pt at visit. Referrals placed for MFM and the [facility] per mlm. Pt placed in follow up book.        ----- Message from Rey Damian MD sent at 5/20/2021  2:51 PM CDT -----  Pa

## 2021-05-25 ENCOUNTER — TELEPHONE (OUTPATIENT)
Dept: OBGYN CLINIC | Facility: CLINIC | Age: 41
End: 2021-05-25

## 2021-05-25 ENCOUNTER — HOSPITAL ENCOUNTER (OUTPATIENT)
Dept: ENDOCRINOLOGY | Facility: HOSPITAL | Age: 41
Discharge: HOME | End: 2021-05-25
Attending: OBSTETRICS & GYNECOLOGY
Payer: COMMERCIAL

## 2021-05-25 VITALS — WEIGHT: 189.88 LBS | BODY MASS INDEX: 35 KG/M2

## 2021-05-25 DIAGNOSIS — O24.111 TYPE 2 DIABETES MELLITUS AFFECTING PREGNANCY IN FIRST TRIMESTER, ANTEPARTUM: Primary | ICD-10-CM

## 2021-05-25 RX ORDER — BLOOD SUGAR DIAGNOSTIC
1 STRIP MISCELLANEOUS 4 TIMES DAILY
Qty: 200 STRIP | Refills: 5 | Status: SHIPPED | OUTPATIENT
Start: 2021-05-25 | End: 2021-09-22

## 2021-05-25 RX ORDER — LANCETS 30 GAUGE
4 EACH MISCELLANEOUS 4 TIMES DAILY
Qty: 200 EACH | Refills: 5 | Status: SHIPPED | OUTPATIENT
Start: 2021-05-25 | End: 2021-09-22

## 2021-05-25 NOTE — TELEPHONE ENCOUNTER
----- Message from Austin Pierre MD sent at 5/25/2021  5:10 PM CDT -----  Regarding: FW: Other  Contact: [phone]      ----- Message -----  From: Carolee Flores RN  Sent: 5/25/2021   4:11 PM CDT  To: Austin Pierre MD  Subject: FW: Other Detail Level: Generalized

## 2021-05-25 NOTE — PROGRESS NOTES
Arlette Collins  : 10/24/1980 was seen for Type 2 Diabetes with Pregnancy Counseling: Individual    Date: 2021   Start time: 2:20 pm End time: 3:23 pm    April is currently 11 weeks pregnant.  She has type 2 Diabetes and A1c prior to pregnancy was 6 monitor/proper lancet disposal. Testing schedules are:   Fastin-90 mg/dL, Call MD if >95 md/dL twice in 1 week   2 Hour Post Prandial:  120 md/dL, Call MD if >120 md/dL twice in 1 week.     Taking Medication:  Reviewed when medication might be indica

## 2021-05-25 NOTE — TELEPHONE ENCOUNTER
----- Message from Shanice Lester MD sent at 5/25/2021  5:10 PM CDT -----  Regarding: FW: Other  Contact: [phone]      ----- Message -----  From: Ness Day RN  Sent: 5/25/2021   4:11 PM CDT  To: Shanice Lester MD  Subject: FW: Other

## 2021-05-26 NOTE — TELEPHONE ENCOUNTER
----- Message from April [address] sent at 5/25/2021  6:56 PM CDT -----  Regarding: RE: Other  Contact: [phone]  Yes, it's coming over now.     ----- Message -----  From: Fidel Pena  Sent: 5/25/21, 6:34 PM  To:  Arlette Collins  Subject: RE: Other

## 2021-05-27 NOTE — PROGRESS NOTES
Outpatient Maternal-Fetal Medicine Consultation    Dear Dr. Arielle Kauffman,    Thank you for requesting ultrasound evaluation and maternal fetal medicine consultation on your patient April R Denae Lu.   As you are aware she is a 47year old female with a [place] alive.    Medications:   Current Outpatient Medications:   •  Lancets (ONETOUCH DELICA PLUS [id number]) Does not apply Misc, 4 each by Does not apply route 4 (four) times daily.  Test morning and 2 hours after each meal., Disp: 200 each, Rfl: 5  •  Glucose B Nasal Suspension, 2 sprays by Nasal route daily. , Disp: 1 Bottle, Rfl: 5  Allergies:    Other                   Runny nose, Coughing    Comment:environmental tree's, grass, dust, feathers      PHYSICAL EXAMINATION:  Wt 189 lb (85.7 kg)   LMP 03/05/2021 (Exa because of differences in methodology and a lack of standardization among laboratories, a value >1 percent above the upper limit of the normal range is associated with an increased risk of congenital anomalies.   Patients with a markedly elevated glycated h initiated. Preeclampsia:  The incidences of hypertension and preeclampsia are increased in pregnant women with diabetes and are related to pregestational hypertension and vascular and renal disease. Poor glycemic control also appears to play a role.   Artemio Villagomez intrauterine fetal death (excluding congenital malformations) with such protocols is approximately 3 per 1000 pregnancies in women with type 1 diabetes. Glycemic Control:   We discussed goals of optimal glucose control: Fasting levels between 60 - 95, an pregnancy and that it does not cross to the fetal circulation. Metformin is commonly used in pregnancy but the long term data on / childhood effects are lacking.   Both medications cross the placenta and  levels are >70% of the maternal lev hypertension including heart disease, strokes and kidney disease.     Preeclampsia Prevention  Many studies have been performed to try to find a way to prevent preeclampsia. These include the use of fish oil, vitamin C, Vitamin E, calcium and aspirin.   A risk of  birth in obese gravidas is primarily associated with obesity-related medical and  complications, rather than an intrinsic predisposition to spontaneous  birth.  Prevention of  birth in these patients, therefore, should complications. Women with weight loss or insufficient weight gain have higher rates of small for gestational age infants.     A recent study found that initiating moderate exercise in early pregnancy for obese gravidas significantly reduced the incidence o women over age 48. The incidence of gestational diabetes in the general obstetric population is 3 percent, rising to 7 to 15 percent in women over age 36 and 21 percent in women over age 48.   Women 28years of age or older are more likely to be delivered genetic abnormalities. Given that the patient will be 46years old at the time of delivery I reviewed that her risk (at amniocentesis) of having a fetus with any chromosome abnormality is 1:50 and with trisomy 24 is 1: [address]     Invasive Testing  I offered in laparoscopic myomectomy, which is likely due to the technical challenge of laparoscopic suturing. Moreover, such uterine ruptures may also occur in the third trimester prior to the onset of labor.  Despite these concerns, the nine studies describing result ultrasound and cervical length at 16 weeks  · Level 2 ultrasound at 20 weeks  · Fetal echocardiogram at 24 weeks  · Monthly growth ultrasound and BPP starting at 28 weeks  · Plan  delivery at 37 weeks due to history of myomectomy  · Begin Levemir i

## 2021-06-01 ENCOUNTER — TELEPHONE (OUTPATIENT)
Dept: ENDOCRINOLOGY | Facility: HOSPITAL | Age: 41
End: 2021-06-01

## 2021-06-01 NOTE — TELEPHONE ENCOUNTER
April contacted this Educator requesting BG meter prescription. States [facility] employs are only prescribed a specific BG meter and test strips. This Educator contacted [facility] and prescriptions for BG kit, lancets and test strips ordered.

## 2021-06-03 ENCOUNTER — HOSPITAL ENCOUNTER (OUTPATIENT)
Dept: PERINATAL CARE | Facility: HOSPITAL | Age: 41
Discharge: HOME | End: 2021-06-03
Attending: OBSTETRICS & GYNECOLOGY
Payer: COMMERCIAL

## 2021-06-03 VITALS — WEIGHT: 189 LBS | BODY MASS INDEX: 35 KG/M2

## 2021-06-03 DIAGNOSIS — D25.9 UTERINE LEIOMYOMA, UNSPECIFIED LOCATION: ICD-10-CM

## 2021-06-03 DIAGNOSIS — O09.521 MULTIGRAVIDA OF ADVANCED MATERNAL AGE IN FIRST TRIMESTER: ICD-10-CM

## 2021-06-03 DIAGNOSIS — O99.211 OBESITY AFFECTING PREGNANCY IN FIRST TRIMESTER: ICD-10-CM

## 2021-06-03 DIAGNOSIS — O10.011 ESSENTIAL HYPERTENSION AFFECTING PREGNANCY IN FIRST TRIMESTER: ICD-10-CM

## 2021-06-03 DIAGNOSIS — O24.911 DIABETES MELLITUS AFFECTING PREGNANCY IN FIRST TRIMESTER: ICD-10-CM

## 2021-06-03 DIAGNOSIS — Z36.9 FIRST TRIMESTER SCREENING: Primary | ICD-10-CM

## 2021-06-03 DIAGNOSIS — Z36.9 1ST TRIMESTER SCREENING: ICD-10-CM

## 2021-06-03 DIAGNOSIS — E11.9 DIABETES (HCC): Primary | ICD-10-CM

## 2021-06-03 DIAGNOSIS — O24.912 DIABETES MELLITUS AFFECTING PREGNANCY, SECOND TRIMESTER: ICD-10-CM

## 2021-06-03 DIAGNOSIS — Z36.9 FIRST TRIMESTER SCREENING: ICD-10-CM

## 2021-06-03 PROCEDURE — 99244 OFF/OP CNSLTJ NEW/EST MOD 40: CPT | Performed by: OBSTETRICS & GYNECOLOGY

## 2021-06-03 PROCEDURE — 76813 OB US NUCHAL MEAS 1 GEST: CPT | Performed by: OBSTETRICS & GYNECOLOGY

## 2021-06-03 RX ORDER — BLOOD SUGAR DIAGNOSTIC
STRIP MISCELLANEOUS
Qty: 100 EACH | Refills: 4 | Status: SHIPPED | OUTPATIENT
Start: 2021-06-03 | End: 2021-12-13

## 2021-06-03 RX ORDER — INSULIN DETEMIR 100 [IU]/ML
14 INJECTION, SOLUTION SUBCUTANEOUS NIGHTLY
Qty: 3 EACH | Refills: 6 | Status: SHIPPED | OUTPATIENT
Start: 2021-06-03 | End: 2021-07-27

## 2021-06-10 ENCOUNTER — TELEPHONE (OUTPATIENT)
Dept: PERINATAL CARE | Facility: HOSPITAL | Age: 41
End: 2021-06-10

## 2021-06-10 NOTE — TELEPHONE ENCOUNTER
Please have the patient increase her Levemir dosing to the following:  Levemir 14 units in the morning and 20 units with her bedtime snack. Please to remind her to set alarms so that she take her blood sugar 2 hours after her meals and if fasting.   If s

## 2021-06-10 NOTE — TELEPHONE ENCOUNTER
Blood Glucose reporting 6/4-6/9    Pt taking 14 Units Levimer at HS      Fasting                                                5/6 out of range    2 Hour post Breakfast                                         2/4 out of range    2 Hour post Shana

## 2021-06-10 NOTE — TELEPHONE ENCOUNTER
My chart message sent with recommendations    Pt states understanding of Blood sugar timing and need for HS snack

## 2021-06-10 NOTE — TELEPHONE ENCOUNTER
[facility] screening results obt  Reviewed by MD Brian Shanks  Low risk for  Trisomy 21  1:80155 risk  Low risk for Trisomy 18/13  1:35201 risk    Fetal sex:  Male  Pt states understanding  Copy of results sent for scanning into pt record

## 2021-06-16 ENCOUNTER — TELEPHONE (OUTPATIENT)
Dept: OBGYN CLINIC | Facility: CLINIC | Age: 41
End: 2021-06-16

## 2021-06-16 NOTE — TELEPHONE ENCOUNTER
Restrictions and limitations and disab received in forms dept. Logged for processing. Valid unum auth received as well. Sent fee information via [employer].

## 2021-06-17 ENCOUNTER — TELEPHONE (OUTPATIENT)
Dept: PERINATAL CARE | Facility: HOSPITAL | Age: 41
End: 2021-06-17

## 2021-06-17 ENCOUNTER — ROUTINE PRENATAL (OUTPATIENT)
Dept: OBGYN CLINIC | Facility: CLINIC | Age: 41
End: 2021-06-17
Payer: COMMERCIAL

## 2021-06-17 VITALS — WEIGHT: 190.38 LBS | SYSTOLIC BLOOD PRESSURE: 126 MMHG | BODY MASS INDEX: 35 KG/M2 | DIASTOLIC BLOOD PRESSURE: 60 MMHG

## 2021-06-17 DIAGNOSIS — Z34.82 ENCOUNTER FOR SUPERVISION OF OTHER NORMAL PREGNANCY IN SECOND TRIMESTER: Primary | ICD-10-CM

## 2021-06-17 DIAGNOSIS — O09.521 MULTIGRAVIDA OF ADVANCED MATERNAL AGE IN FIRST TRIMESTER: ICD-10-CM

## 2021-06-17 PROBLEM — N92.0 MENORRHAGIA: Status: RESOLVED | Noted: 2019-08-07 | Resolved: 2021-06-17

## 2021-06-17 PROBLEM — E11.9 TYPE 2 DIABETES MELLITUS WITHOUT COMPLICATION (HCC): Status: ACTIVE | Noted: 2021-06-17

## 2021-06-17 PROBLEM — O00.109 ECTOPIC PREGNANCY, TUBAL: Status: RESOLVED | Noted: 2019-03-19 | Resolved: 2021-06-17

## 2021-06-17 PROBLEM — O00.109 ECTOPIC PREGNANCY, TUBAL (HCC): Status: RESOLVED | Noted: 2019-03-19 | Resolved: 2021-06-17

## 2021-06-17 PROCEDURE — 81002 URINALYSIS NONAUTO W/O SCOPE: CPT | Performed by: OBSTETRICS & GYNECOLOGY

## 2021-06-17 PROCEDURE — 3074F SYST BP LT 130 MM HG: CPT | Performed by: OBSTETRICS & GYNECOLOGY

## 2021-06-17 PROCEDURE — 3078F DIAST BP <80 MM HG: CPT | Performed by: OBSTETRICS & GYNECOLOGY

## 2021-06-17 RX ORDER — INSULIN LISPRO 100 [IU]/ML
4 INJECTION, SOLUTION INTRAVENOUS; SUBCUTANEOUS
Qty: 10 ML | Refills: 1 | Status: SHIPPED | OUTPATIENT
Start: 2021-06-17 | End: 2021-06-22

## 2021-06-17 NOTE — TELEPHONE ENCOUNTER
14w5d  Received BS log for date range 6/11-6/16     Low  High Out of Range   Fasting Blood Sugar 76 91 0/6   Post Breakfast 72 197 2/4   Post Lunch 89 153 3/6   Post Dinner 117 186 5/6     Patient is currently taking    Levemir 14 units in the morning and

## 2021-06-17 NOTE — PROGRESS NOTES
Patient with diabetes on Levemir 14 units at bedtime. Had first trimester screen which was normal.  Will order MSAFP to do at 16 weeks. Has a follow-up ultrasound with MFM and for cervical length at 16 weeks. Will have level 2 ultrasound at 20 weeks.   Thaddeus Wen

## 2021-06-17 NOTE — TELEPHONE ENCOUNTER
Continue Levemir 14 units in the morning and 20 units with her bedtime snack  Begin Humalog (or Novolog) 4 units before lunch and 4 units before dinner

## 2021-06-19 ENCOUNTER — TELEPHONE (OUTPATIENT)
Dept: OBGYN CLINIC | Facility: CLINIC | Age: 41
End: 2021-06-19

## 2021-06-19 NOTE — TELEPHONE ENCOUNTER
Received fax for FMLA paperwork from [employer] for patient. No ADENIKE Received  No Payment collected. Please follow up, thank you.

## 2021-06-21 ENCOUNTER — MED REC SCAN ONLY (OUTPATIENT)
Dept: ADMINISTRATIVE | Age: 41
End: 2021-06-21

## 2021-06-22 ENCOUNTER — TELEPHONE (OUTPATIENT)
Dept: PERINATAL CARE | Facility: HOSPITAL | Age: 41
End: 2021-06-22

## 2021-06-22 RX ORDER — INSULIN LISPRO 100 [IU]/ML
4 INJECTION, SOLUTION INTRAVENOUS; SUBCUTANEOUS
Qty: 10 ML | Refills: 1 | COMMUNITY
Start: 2021-06-22 | End: 2021-07-15

## 2021-06-22 NOTE — TELEPHONE ENCOUNTER
15w3d  Received BS log for date range 6/17-6/22     Low  High Out of Range   Fasting Blood Sugar 63 109 1/5   Post Breakfast 98 130 1/4   Post Lunch 77 127 1/5   Post Dinner 144 157 5/5     Patient is currently taking     Levimer 14 Units in AM  20 Units a
Kera 14 Units in AM  20 Units at HS     Humalog 4 Units Lunch  10 Units Dinner
Pt contacted through My chart with new recommendations
Pain, multiple myeloma

## 2021-06-23 NOTE — TELEPHONE ENCOUNTER
Dr. Minnie Moura,    **4 forms needing signature**     Please sign off on form: 2 Disability forms, FMla and restriction  -Highlight the patient and hit \"Chart\" button.   -In Chart Review, w/in the Encounter tab - click 1 time on the Telephone call encounter for

## 2021-06-24 NOTE — TELEPHONE ENCOUNTER
Forms completed and faxed to [facility] at [phone]. Sent [facility].

## 2021-06-29 ENCOUNTER — HOSPITAL ENCOUNTER (OUTPATIENT)
Dept: PERINATAL CARE | Facility: HOSPITAL | Age: 41
Discharge: HOME | End: 2021-06-29
Attending: OBSTETRICS & GYNECOLOGY
Payer: COMMERCIAL

## 2021-06-29 ENCOUNTER — TELEPHONE (OUTPATIENT)
Dept: PERINATAL CARE | Facility: HOSPITAL | Age: 41
End: 2021-06-29

## 2021-06-29 VITALS
BODY MASS INDEX: 35 KG/M2 | SYSTOLIC BLOOD PRESSURE: 133 MMHG | HEART RATE: 97 BPM | WEIGHT: 193 LBS | DIASTOLIC BLOOD PRESSURE: 83 MMHG

## 2021-06-29 DIAGNOSIS — D25.9 UTERINE LEIOMYOMA, UNSPECIFIED LOCATION: ICD-10-CM

## 2021-06-29 DIAGNOSIS — O09.521 MULTIGRAVIDA OF ADVANCED MATERNAL AGE IN FIRST TRIMESTER: ICD-10-CM

## 2021-06-29 DIAGNOSIS — E11.9 TYPE 2 DIABETES MELLITUS WITHOUT COMPLICATION (HCC): ICD-10-CM

## 2021-06-29 DIAGNOSIS — E11.9 TYPE 2 DIABETES MELLITUS WITHOUT COMPLICATION, WITHOUT LONG-TERM CURRENT USE OF INSULIN (HCC): ICD-10-CM

## 2021-06-29 DIAGNOSIS — E11.9 TYPE 2 DIABETES MELLITUS WITHOUT COMPLICATION (HCC): Primary | ICD-10-CM

## 2021-06-29 PROCEDURE — 76817 TRANSVAGINAL US OBSTETRIC: CPT | Performed by: OBSTETRICS & GYNECOLOGY

## 2021-06-29 PROCEDURE — 99213 OFFICE O/P EST LOW 20 MIN: CPT | Performed by: OBSTETRICS & GYNECOLOGY

## 2021-06-29 PROCEDURE — 76816 OB US FOLLOW-UP PER FETUS: CPT | Performed by: OBSTETRICS & GYNECOLOGY

## 2021-06-29 NOTE — TELEPHONE ENCOUNTER
16w3d  Received BS log for date range 6/23-6/29       Low  High Out of Range   Fasting Blood Sugar 56 93 0/7   Post Breakfast 75 125 1/5   Post Lunch 77 167 1/4   Post Dinner No data No data No data      NO CHANGE     Levimer 14 Units in AM  20 Units at HS

## 2021-06-29 NOTE — TELEPHONE ENCOUNTER
16w3d  Received BS log for date range 6/23-6/29     Low  High Out of Range   Fasting Blood Sugar 56 93 0/7   Post Breakfast 75 125 1/5   Post Lunch 77 167 1/4   Post Dinner No data No data No data     Patient is currently taking     Levimer 14 Units in AM

## 2021-06-29 NOTE — PROGRESS NOTES
Outpatient Maternal-Fetal Medicine Consultation    Dear Dr. Arielle Kauffman,    Thank you for requesting ultrasound evaluation and maternal fetal medicine consultation on your patient April R Denae Lu.   As you are aware she is a 47year old female with a [place] alive.    Medications:   Current Outpatient Medications:   •  Insulin Lispro 100 UNIT/ML Subcutaneous Solution, Inject 4 Units into the skin daily before lunch.  Units before Lunch and 10 Units before Dinner, Disp: 10 mL, Rfl: 1  •  insulin detemir (LEVEMIR Activated, INHALE 2 PUFFS INTO THE LUNGS EVERY 4 TO 6 HOURS AS NEEDED.  (Patient not taking: Reported on 6/17/2021), Disp: 1 each, Rfl: 0  •  Glucose Blood (ONETOUCH VERIO) In Vitro Strip, check sugar fasting MWF and at bedtime Koko Phillips, Disp: 200 strip neural tube), and fetal demise and possible need for  delivery. The signs and symptoms of preeclampsia were discussed. We also discussed the potential risks and benefits of low dose aspirin and anti-hypertensive medication.   She understands that study reported no uterine ruptures in 120 patients who had underwent abdominal myomectomy; however, this cohort was limited to myomectomies that did not enter the uterine cavity and were not complicated by postoperative infection.  Subsequent reports have s Anterior  2. Size 42 mm x 36 mm x 41 mm. Mean 40.0 mm. Vol 33.370 cm³. intramural/LIBAN  3. Size 38 mm x 28 mm x 36 mm. Mean 33.6 mm. Vol 19.371 cm³. LIBAN      Transvaginal ultrasound was performed and the cervical length is 4.4 cm without funneling.       I

## 2021-07-02 ENCOUNTER — HOSPITAL ENCOUNTER (EMERGENCY)
Facility: HOSPITAL | Age: 41
Discharge: HOME | End: 2021-07-02
Attending: EMERGENCY MEDICINE
Payer: COMMERCIAL

## 2021-07-02 ENCOUNTER — APPOINTMENT (OUTPATIENT)
Dept: GENERAL RADIOLOGY | Facility: HOSPITAL | Age: 41
End: 2021-07-02
Attending: EMERGENCY MEDICINE
Payer: COMMERCIAL

## 2021-07-02 VITALS
DIASTOLIC BLOOD PRESSURE: 76 MMHG | SYSTOLIC BLOOD PRESSURE: 128 MMHG | HEART RATE: 80 BPM | WEIGHT: 193 LBS | OXYGEN SATURATION: 100 % | RESPIRATION RATE: 18 BRPM | BODY MASS INDEX: 35.51 KG/M2 | HEIGHT: 62 IN

## 2021-07-02 DIAGNOSIS — R00.2 PALPITATIONS: Primary | ICD-10-CM

## 2021-07-02 DIAGNOSIS — J18.9 ATYPICAL PNEUMONIA: ICD-10-CM

## 2021-07-02 LAB
ANION GAP SERPL CALC-SCNC: 6 MMOL/L (ref 0–18)
BASOPHILS # BLD AUTO: 0.05 X10(3) UL (ref 0–0.2)
BASOPHILS NFR BLD AUTO: 0.6 %
BUN BLD-MCNC: 7 MG/DL (ref 7–18)
BUN/CREAT SERPL: 12.3 (ref 10–20)
CALCIUM BLD-MCNC: 9.6 MG/DL (ref 8.5–10.1)
CHLORIDE SERPL-SCNC: 107 MMOL/L (ref 98–112)
CO2 SERPL-SCNC: 26 MMOL/L (ref 21–32)
CREAT BLD-MCNC: 0.57 MG/DL
DEPRECATED RDW RBC AUTO: 43.1 FL (ref 35.1–46.3)
EOSINOPHIL # BLD AUTO: 0.14 X10(3) UL (ref 0–0.7)
EOSINOPHIL NFR BLD AUTO: 1.6 %
ERYTHROCYTE [DISTWIDTH] IN BLOOD BY AUTOMATED COUNT: 13.1 % (ref 11–15)
GLUCOSE BLD-MCNC: 92 MG/DL (ref 70–99)
HCT VFR BLD AUTO: 36.4 %
HGB BLD-MCNC: 11.9 G/DL
IMM GRANULOCYTES # BLD AUTO: 0.05 X10(3) UL (ref 0–1)
IMM GRANULOCYTES NFR BLD: 0.6 %
LYMPHOCYTES # BLD AUTO: 2.44 X10(3) UL (ref 1–4)
LYMPHOCYTES NFR BLD AUTO: 27.1 %
MCH RBC QN AUTO: 29.4 PG (ref 26–34)
MCHC RBC AUTO-ENTMCNC: 32.7 G/DL (ref 31–37)
MCV RBC AUTO: 89.9 FL
MONOCYTES # BLD AUTO: 0.83 X10(3) UL (ref 0.1–1)
MONOCYTES NFR BLD AUTO: 9.2 %
NEUTROPHILS # BLD AUTO: 5.5 X10 (3) UL (ref 1.5–7.7)
NEUTROPHILS # BLD AUTO: 5.5 X10(3) UL (ref 1.5–7.7)
NEUTROPHILS NFR BLD AUTO: 60.9 %
OSMOLALITY SERPL CALC.SUM OF ELEC: 286 MOSM/KG (ref 275–295)
PLATELET # BLD AUTO: 239 10(3)UL (ref 150–450)
POTASSIUM SERPL-SCNC: 3.7 MMOL/L (ref 3.5–5.1)
RBC # BLD AUTO: 4.05 X10(6)UL
SODIUM SERPL-SCNC: 139 MMOL/L (ref 136–145)
TROPONIN I SERPL-MCNC: <0.045 NG/ML (ref ?–0.04)
WBC # BLD AUTO: 9 X10(3) UL (ref 4–11)

## 2021-07-02 PROCEDURE — 85025 COMPLETE CBC W/AUTO DIFF WBC: CPT | Performed by: EMERGENCY MEDICINE

## 2021-07-02 PROCEDURE — 84484 ASSAY OF TROPONIN QUANT: CPT | Performed by: EMERGENCY MEDICINE

## 2021-07-02 PROCEDURE — 93010 ELECTROCARDIOGRAM REPORT: CPT | Performed by: EMERGENCY MEDICINE

## 2021-07-02 PROCEDURE — 99284 EMERGENCY DEPT VISIT MOD MDM: CPT

## 2021-07-02 PROCEDURE — 71045 X-RAY EXAM CHEST 1 VIEW: CPT | Performed by: EMERGENCY MEDICINE

## 2021-07-02 PROCEDURE — 93005 ELECTROCARDIOGRAM TRACING: CPT

## 2021-07-02 PROCEDURE — 36415 COLL VENOUS BLD VENIPUNCTURE: CPT

## 2021-07-02 PROCEDURE — 80048 BASIC METABOLIC PNL TOTAL CA: CPT | Performed by: EMERGENCY MEDICINE

## 2021-07-02 RX ORDER — AZITHROMYCIN 250 MG/1
TABLET, FILM COATED ORAL
Qty: 6 TABLET | Refills: 0 | Status: SHIPPED | OUTPATIENT
Start: 2021-07-02 | End: 2021-07-07

## 2021-07-02 NOTE — ED PROVIDER NOTES
Patient Seen in: [facility] AND [facility] Emergency Department      History   Patient presents with:  Palpitations    Stated Complaint: palpitatoins    HPI/Subjective:   HPI    45-year-old with diabetes and hypertension without family history of cardiac disease systems reviewed and negative except as noted above.     Physical Exam     ED Triage Vitals [07/02/21 0341]   BP (!) 132/104   Pulse 99   Resp 24   Temp    Temp src    SpO2 100 %   O2 Device None (Room air)       Current:/76   Pulse 80   Resp 18   Ht individual orders. RAINBOW DRAW BLUE   RAINBOW DRAW LAVENDER   RAINBOW DRAW LIGHT GREEN   RAINBOW DRAW GOLD     EKG    Rate, intervals and axes as noted on EKG Report.   Rate: 95  Rhythm: Sinus Rhythm  Reading: No acute ischemic changes                Lacey Medication List    START taking these medications    azithromycin (ZITHROMAX Z-TO) 250 MG Oral Tab  500 mg once followed by 250 mg daily x 4 days  Qty: 6 tablet Refills: 0

## 2021-07-06 ENCOUNTER — TELEPHONE (OUTPATIENT)
Dept: PERINATAL CARE | Facility: HOSPITAL | Age: 41
End: 2021-07-06

## 2021-07-06 NOTE — TELEPHONE ENCOUNTER
Blood Sugar Log was reviewed  Through 7/5/2021    No changes in management    Levimer 14 Units in AM  20 Units at HS     Humalog 4 Units Lunch  10 Units Dinner    Pt encourage to test 4 times per day for more effective glucose managment

## 2021-07-12 ENCOUNTER — TELEPHONE (OUTPATIENT)
Dept: PERINATAL CARE | Facility: HOSPITAL | Age: 41
End: 2021-07-12

## 2021-07-13 ENCOUNTER — ROUTINE PRENATAL (OUTPATIENT)
Dept: OBGYN CLINIC | Facility: CLINIC | Age: 41
End: 2021-07-13
Payer: COMMERCIAL

## 2021-07-13 VITALS — DIASTOLIC BLOOD PRESSURE: 76 MMHG | SYSTOLIC BLOOD PRESSURE: 122 MMHG | WEIGHT: 196 LBS | BODY MASS INDEX: 36 KG/M2

## 2021-07-13 DIAGNOSIS — O24.419 GDM, CLASS A2: ICD-10-CM

## 2021-07-13 DIAGNOSIS — O10.919 CHRONIC HYPERTENSION AFFECTING PREGNANCY: ICD-10-CM

## 2021-07-13 DIAGNOSIS — O09.529 ANTEPARTUM MULTIGRAVIDA OF ADVANCED MATERNAL AGE: Primary | ICD-10-CM

## 2021-07-13 DIAGNOSIS — Z34.82 ENCOUNTER FOR SUPERVISION OF OTHER NORMAL PREGNANCY IN SECOND TRIMESTER: ICD-10-CM

## 2021-07-13 LAB
APPEARANCE: CLEAR
BILIRUBIN: NEGATIVE
GLUCOSE (URINE DIPSTICK): NEGATIVE MG/DL
KETONES (URINE DIPSTICK): NEGATIVE MG/DL
LEUKOCYTES: NEGATIVE
MULTISTIX LOT#: 5077 NUMERIC
NITRITE, URINE: NEGATIVE
OCCULT BLOOD: NEGATIVE
PH, URINE: 6.5 (ref 4.5–8)
SPECIFIC GRAVITY: 1.01 (ref 1–1.03)
URINE-COLOR: YELLOW
UROBILINOGEN,SEMI-QN: 0.2 MG/DL (ref 0–1.9)

## 2021-07-13 PROCEDURE — 3078F DIAST BP <80 MM HG: CPT | Performed by: OBSTETRICS & GYNECOLOGY

## 2021-07-13 PROCEDURE — 81002 URINALYSIS NONAUTO W/O SCOPE: CPT | Performed by: OBSTETRICS & GYNECOLOGY

## 2021-07-13 PROCEDURE — 3074F SYST BP LT 130 MM HG: CPT | Performed by: OBSTETRICS & GYNECOLOGY

## 2021-07-13 NOTE — PROGRESS NOTES
[facility], [facility]  Obstetrics and Gynecology  Prenatal Visit  Lora Holden MD    HPI   April R Donis Nair is a 47year old.o.  18w4d weeks. Pt feeling fetal movement. No SROM, vaginal bleeding or regular contractions.   Pt checking her accuchecks QID capsule by mouth daily. • METOPROLOL SUCCINATE ER 50 MG Oral Tablet 24 Hr TAKE 1 TABLET BY MOUTH EVERY DAY 30 tablet 0   • FLUoxetine HCl 20 MG Oral Cap Take 1 capsule (20 mg total) by mouth every 6 (six) hours.  30 capsule 0   • MONTELUKAST SODIUM 10 M hypertension affecting pregnancy    (O24.419) GDM, class A2    Plan   Patient with MFM ultrasound in 2 weeks. Patient to continue faxing/sending glucose levels to [facility] for diabetes management.   Patient to continue antihypertensive medication and baby aspir

## 2021-07-15 ENCOUNTER — TELEPHONE (OUTPATIENT)
Dept: PERINATAL CARE | Facility: HOSPITAL | Age: 41
End: 2021-07-15

## 2021-07-15 RX ORDER — INSULIN LISPRO 100 [IU]/ML
4 INJECTION, SOLUTION INTRAVENOUS; SUBCUTANEOUS
Qty: 10 ML | Refills: 1 | Status: SHIPPED | OUTPATIENT
Start: 2021-07-15 | End: 2021-07-27

## 2021-07-15 NOTE — TELEPHONE ENCOUNTER
18w6d  Received BS log for date range 7/6-7/13     Low  High Out of Range   Fasting Blood Sugar 84 141 2/7 (ate pizza late for 141   Post Breakfast 85 145 3/3   Post Lunch 93 112 0/5   Post Dinner 88 145 1/3     Patient is currently taking     Levimer 14 U

## 2021-07-15 NOTE — TELEPHONE ENCOUNTER
Continue Levemir 14 units subcu in the morning and 20 units subcu at night.   Humalog begin 4 units subcu at breakfast, continue 4 units subcu at lunch and 10 units subcu at dinner

## 2021-07-19 ENCOUNTER — TELEPHONE (OUTPATIENT)
Dept: OBGYN CLINIC | Facility: CLINIC | Age: 41
End: 2021-07-19

## 2021-07-19 NOTE — TELEPHONE ENCOUNTER
Patient informed of MLM message. Patient advised again to push fluids, Brat diet, and rest. Also advised patient to reference OB packet on safe medications to take during pregnancy. encouraged patient if symptoms do not resolve to contact office.  No furthe

## 2021-07-19 NOTE — TELEPHONE ENCOUNTER
OB History     T0    L0    SAB0  TAB0  Ectopic1  Multiple0  Live Births0       Comment: PT DOES NOT WANT HER CURRENT PARTNER TO KNOW ABOUT HER ECTOPIC PREGNANCY.   19w3d    Patient states she vomited \"chucks of blood\" this morning and had a no

## 2021-07-19 NOTE — TELEPHONE ENCOUNTER
Agree with Triage advice given. It is common to have nose bleeds in pregnancy as well as seeing blood when vomiting. The mucous membranes are very spongy in pregnancy and bleed easily.   The force of vomiting can disrupt a lot of little vessels in the eso

## 2021-07-22 ENCOUNTER — TELEPHONE (OUTPATIENT)
Dept: PERINATAL CARE | Facility: HOSPITAL | Age: 41
End: 2021-07-22

## 2021-07-26 NOTE — PROGRESS NOTES
Julio Cesar Rock    Dear Dr. Tay Wells    Thank you for requesting an ultrasound and maternal-fetal medicine consultation on your patient  Chava Kenny.   As you are aware she is a 47year old female  with a keen pre dietary compliance is adequate. Her capillary blood glucose records were reviewed today; her compliance with the recommended four times daily assessments (fasting and two-hour post-prandial) is adequate. Her overall glucose control is inadequate.     Her pounds or less  · Fetal echocardiogram at 24 weeks  · Monthly growth ultrasound and BPP starting at 28 weeks  · Plan  delivery at 37 weeks due to history of myomectomy      Thank you for allowing me to participate in the care of your patient.   Yahaira

## 2021-07-27 ENCOUNTER — TELEPHONE (OUTPATIENT)
Dept: PERINATAL CARE | Facility: HOSPITAL | Age: 41
End: 2021-07-27

## 2021-07-27 ENCOUNTER — HOSPITAL ENCOUNTER (OUTPATIENT)
Dept: PERINATAL CARE | Facility: HOSPITAL | Age: 41
Discharge: HOME | End: 2021-07-27
Attending: OBSTETRICS & GYNECOLOGY
Payer: COMMERCIAL

## 2021-07-27 VITALS
SYSTOLIC BLOOD PRESSURE: 130 MMHG | WEIGHT: 198 LBS | BODY MASS INDEX: 36 KG/M2 | DIASTOLIC BLOOD PRESSURE: 76 MMHG | HEART RATE: 86 BPM

## 2021-07-27 DIAGNOSIS — E11.9 TYPE 2 DIABETES MELLITUS WITHOUT COMPLICATION, WITHOUT LONG-TERM CURRENT USE OF INSULIN (HCC): ICD-10-CM

## 2021-07-27 DIAGNOSIS — O10.919 CHRONIC HYPERTENSION AFFECTING PREGNANCY: ICD-10-CM

## 2021-07-27 DIAGNOSIS — O09.521 MULTIGRAVIDA OF ADVANCED MATERNAL AGE IN FIRST TRIMESTER: Primary | ICD-10-CM

## 2021-07-27 DIAGNOSIS — O24.419 GDM, CLASS A2: ICD-10-CM

## 2021-07-27 DIAGNOSIS — O24.911 DIABETES MELLITUS AFFECTING PREGNANCY IN FIRST TRIMESTER: ICD-10-CM

## 2021-07-27 DIAGNOSIS — O09.521 MULTIGRAVIDA OF ADVANCED MATERNAL AGE IN FIRST TRIMESTER: ICD-10-CM

## 2021-07-27 PROCEDURE — 76811 OB US DETAILED SNGL FETUS: CPT | Performed by: OBSTETRICS & GYNECOLOGY

## 2021-07-27 PROCEDURE — 99215 OFFICE O/P EST HI 40 MIN: CPT | Performed by: OBSTETRICS & GYNECOLOGY

## 2021-07-27 RX ORDER — INSULIN DETEMIR 100 [IU]/ML
INJECTION, SOLUTION SUBCUTANEOUS
Qty: 3 EACH | Refills: 6 | COMMUNITY
Start: 2021-07-27 | End: 2021-08-06

## 2021-07-27 RX ORDER — INSULIN LISPRO 100 [IU]/ML
INJECTION, SOLUTION INTRAVENOUS; SUBCUTANEOUS
Qty: 10 ML | Refills: 1 | Status: SHIPPED | OUTPATIENT
Start: 2021-07-27 | End: 2021-08-19 | Stop reason: ALTCHOICE

## 2021-07-27 RX ORDER — INSULIN LISPRO 100 [IU]/ML
INJECTION, SOLUTION INTRAVENOUS; SUBCUTANEOUS
Qty: 10 ML | Refills: 1 | COMMUNITY
Start: 2021-07-27 | End: 2021-07-27

## 2021-07-27 NOTE — TELEPHONE ENCOUNTER
New regimen:    Levemir 14 units AM   22 units HS     Humalog 6 units subcu at breakfast,               6 units  lunch                10 units t dinner    Kisha Nova. Candace Reid M.D.   Maternal-Fetal Medicine

## 2021-07-27 NOTE — TELEPHONE ENCOUNTER
20w4d  Received BS log for date range 7/17-7/24     Low  High Out of Range   Fasting Blood Sugar 79 143 3/8   143  Reading was pizza overnight   Post Breakfast 85 180 2/6   Post Lunch 82 148 2/6   Post Dinner 80 117 0/6     Patient is currently taking

## 2021-08-06 ENCOUNTER — TELEPHONE (OUTPATIENT)
Dept: PERINATAL CARE | Facility: HOSPITAL | Age: 41
End: 2021-08-06

## 2021-08-06 DIAGNOSIS — O24.911 DIABETES MELLITUS AFFECTING PREGNANCY IN FIRST TRIMESTER: ICD-10-CM

## 2021-08-06 RX ORDER — INSULIN DETEMIR 100 [IU]/ML
INJECTION, SOLUTION SUBCUTANEOUS
Qty: 3 EACH | Refills: 6 | COMMUNITY
Start: 2021-08-06 | End: 2021-08-27

## 2021-08-06 NOTE — TELEPHONE ENCOUNTER
New regimen:    Levemir 14 units qAM, 26units qPM  Humalog 6 units with breakfast, 6 units with lunch, 14 units with dinner. Mona Pichardo. Jeevan White M.D.   Maternal-Fetal Medicine

## 2021-08-06 NOTE — TELEPHONE ENCOUNTER
Pt notified of new insulin recommendations    Levemir 14 units qAM, 26units qPM  Humalog 6 units with breakfast, 6 units with lunch, 14 units with dinner.

## 2021-08-10 ENCOUNTER — TELEPHONE (OUTPATIENT)
Dept: OBGYN CLINIC | Facility: CLINIC | Age: 41
End: 2021-08-10

## 2021-08-10 NOTE — TELEPHONE ENCOUNTER
Pt has phlegm and has not been going to work because of it. She would like another solution besides saline. Please advise \"ASAP\" -Pt words not mine.

## 2021-08-10 NOTE — TELEPHONE ENCOUNTER
Pt with seasonal allergy sx,  pt counseled on antihistamines/use of nasal fluticasone/using an HEPA air purifier in her bedroom ( pt's sx start in the am when she wakes up). Pt to call if sx not resolved with these interventions.

## 2021-08-12 ENCOUNTER — TELEPHONE (OUTPATIENT)
Dept: PERINATAL CARE | Facility: HOSPITAL | Age: 41
End: 2021-08-12

## 2021-08-14 ENCOUNTER — ROUTINE PRENATAL (OUTPATIENT)
Dept: OBGYN CLINIC | Facility: CLINIC | Age: 41
End: 2021-08-14
Payer: COMMERCIAL

## 2021-08-14 VITALS — DIASTOLIC BLOOD PRESSURE: 62 MMHG | SYSTOLIC BLOOD PRESSURE: 126 MMHG | BODY MASS INDEX: 37 KG/M2 | WEIGHT: 201.63 LBS

## 2021-08-14 DIAGNOSIS — Z34.82 ENCOUNTER FOR SUPERVISION OF OTHER NORMAL PREGNANCY IN SECOND TRIMESTER: Primary | ICD-10-CM

## 2021-08-14 LAB
APPEARANCE: CLEAR
BILIRUBIN: NEGATIVE
GLUCOSE (URINE DIPSTICK): NEGATIVE MG/DL
KETONES (URINE DIPSTICK): NEGATIVE MG/DL
LEUKOCYTES: NEGATIVE
MULTISTIX LOT#: ABNORMAL NUMERIC
NITRITE, URINE: NEGATIVE
PH, URINE: 7 (ref 4.5–8)
PROTEIN (URINE DIPSTICK): 30 MG/DL
SPECIFIC GRAVITY: 1.02 (ref 1–1.03)
URINE-COLOR: YELLOW
UROBILINOGEN,SEMI-QN: 0.2 MG/DL (ref 0–1.9)

## 2021-08-14 PROCEDURE — 3078F DIAST BP <80 MM HG: CPT | Performed by: OBSTETRICS & GYNECOLOGY

## 2021-08-14 PROCEDURE — 81002 URINALYSIS NONAUTO W/O SCOPE: CPT | Performed by: OBSTETRICS & GYNECOLOGY

## 2021-08-14 PROCEDURE — 3074F SYST BP LT 130 MM HG: CPT | Performed by: OBSTETRICS & GYNECOLOGY

## 2021-08-14 RX ORDER — METOPROLOL SUCCINATE 50 MG/1
50 TABLET, EXTENDED RELEASE ORAL DAILY
Qty: 60 TABLET | Refills: 2 | Status: SHIPPED | OUTPATIENT
Start: 2021-08-14

## 2021-08-14 RX ORDER — MONTELUKAST SODIUM 10 MG/1
10 TABLET ORAL NIGHTLY
Qty: 90 TABLET | Refills: 0 | Status: SHIPPED | OUTPATIENT
Start: 2021-08-14 | End: 2021-11-06

## 2021-08-14 NOTE — PROGRESS NOTES
Pt stated her blood sugars are stable,  mfm just increased her insulin and she has been in contact with mfm at least weekly to manage her blood sugars. Pt asx, feels well,  30 protein noted, and pt to provide clean catch urine now.

## 2021-08-18 ENCOUNTER — LAB ENCOUNTER (OUTPATIENT)
Dept: LAB | Age: 41
End: 2021-08-18
Attending: OBSTETRICS & GYNECOLOGY
Payer: COMMERCIAL

## 2021-08-18 DIAGNOSIS — N06.0 ISOLATED PROTEINURIA WITH MINOR GLOMERULAR ABNORMALITY: Primary | ICD-10-CM

## 2021-08-18 DIAGNOSIS — N06.0 ISOLATED PROTEINURIA WITH MINOR GLOMERULAR ABNORMALITY: ICD-10-CM

## 2021-08-18 PROCEDURE — 87086 URINE CULTURE/COLONY COUNT: CPT

## 2021-08-19 ENCOUNTER — TELEPHONE (OUTPATIENT)
Dept: PERINATAL CARE | Facility: HOSPITAL | Age: 41
End: 2021-08-19

## 2021-08-19 RX ORDER — INSULIN LISPRO 100 [IU]/ML
10 INJECTION, SOLUTION INTRAVENOUS; SUBCUTANEOUS
Qty: 10 ML | Refills: 0 | Status: SHIPPED | OUTPATIENT
Start: 2021-08-19 | End: 2021-08-24 | Stop reason: CLARIF

## 2021-08-19 NOTE — TELEPHONE ENCOUNTER
23w6d  Received BS log for date range 8/7-8/13     Low  High Out of Range   Fasting Blood Sugar 88 134 2/6   Post Breakfast 115 147 4/5   Post Lunch 76 140 2/4   Post Dinner 91 230 3/5     Patient is currently taking       Levemir 14 units qAM, 26units qPM

## 2021-08-19 NOTE — TELEPHONE ENCOUNTER
Blood Glucose flow sheet  reviewed  By Dr Elysia Collins through 8/13       Recommendations      Levemir 18 units qAM, 30units qPM  Humalog 10 units with breakfast, 8 units with lunch, 16 units with dinner    Pt notified through My chart VMbox full

## 2021-08-19 NOTE — TELEPHONE ENCOUNTER
Adjust her insulin to the following:  Levemir 18 units qAM, 30units qPM  Humalog 10 units with breakfast, 8 units with lunch, 16 units with dinner.

## 2021-08-23 ENCOUNTER — TELEPHONE (OUTPATIENT)
Dept: OBGYN CLINIC | Facility: CLINIC | Age: 41
End: 2021-08-23

## 2021-08-23 DIAGNOSIS — Z20.822 EXPOSURE TO CONFIRMED CASE OF COVID-19: Primary | ICD-10-CM

## 2021-08-23 NOTE — TELEPHONE ENCOUNTER
Patient states she came in contact for someone who tested positive for covid. Patient wants to know what she should do.

## 2021-08-23 NOTE — PROGRESS NOTES
Angeles Lee  Dear Dr. Filiberto Perez     Thank you for requesting an ultrasound and maternal-fetal medicine consultation on your patient  Nicolette Merinocatherine.   As you are aware she is a 47year old female  with a keen p    Continued medication use and home blood pressure assessments were reviewed as well as recommendations for serial growth ultrasounds and antepartum testing.     OBESITY  See prior MFM notes for a detailed review.     ADVANCED MATERNAL AGE  See prior MF

## 2021-08-23 NOTE — TELEPHONE ENCOUNTER
Pt 24w3d was just informed she was exposed on Thursday to someone who came back positive for covid. Per pt this individual and her went to each others desk several times during the day and is looking for guidance.      Pt denied any apparant symptoms and i

## 2021-08-24 ENCOUNTER — TELEPHONE (OUTPATIENT)
Dept: PERINATAL CARE | Facility: HOSPITAL | Age: 41
End: 2021-08-24

## 2021-08-24 ENCOUNTER — LAB ENCOUNTER (OUTPATIENT)
Dept: LAB | Age: 41
End: 2021-08-24
Attending: OBSTETRICS & GYNECOLOGY
Payer: COMMERCIAL

## 2021-08-24 ENCOUNTER — APPOINTMENT (OUTPATIENT)
Dept: PERINATAL CARE | Facility: HOSPITAL | Age: 41
End: 2021-08-24
Attending: OBSTETRICS & GYNECOLOGY
Payer: COMMERCIAL

## 2021-08-24 DIAGNOSIS — Z20.822 EXPOSURE TO CONFIRMED CASE OF COVID-19: ICD-10-CM

## 2021-08-26 ENCOUNTER — TELEPHONE (OUTPATIENT)
Dept: OBGYN CLINIC | Facility: CLINIC | Age: 41
End: 2021-08-26

## 2021-08-26 LAB — SARS-COV-2 RNA RESP QL NAA+PROBE: NOT DETECTED

## 2021-08-26 NOTE — PROGRESS NOTES
Outpatient Maternal-Fetal Medicine Follow-Up     Dear Mckenzie Longoria     Thank you for requesting an ultrasound and maternal-fetal medicine consultation on your patient April Jose Rod you are aware she is a 47year old female  with a keen p appears to be a structurally  normal fetal heart and rhythm.  The patient was made aware of the limitations of the fetal heart study: malformations such as but not limiting to minor valve , VSD, anomalous pulmonary venus return, or coarctation of the aorta fibroids  · AMA: low-risk cell free fetal DNA, declined invasive testing  · Chronic hypertension  · Obesity complicating pregnancy  · Type 2 diabetes diagnosed in early pregnancy   · History of myomectomy     RECOMMENDATIONS:  · Continue care with Dr. Karyn Zaldivar

## 2021-08-27 ENCOUNTER — HOSPITAL ENCOUNTER (OUTPATIENT)
Dept: PERINATAL CARE | Facility: HOSPITAL | Age: 41
Discharge: HOME | End: 2021-08-27
Attending: OBSTETRICS & GYNECOLOGY
Payer: COMMERCIAL

## 2021-08-27 ENCOUNTER — TELEPHONE (OUTPATIENT)
Dept: PERINATAL CARE | Facility: HOSPITAL | Age: 41
End: 2021-08-27

## 2021-08-27 VITALS
WEIGHT: 201 LBS | BODY MASS INDEX: 37 KG/M2 | SYSTOLIC BLOOD PRESSURE: 126 MMHG | HEART RATE: 94 BPM | DIASTOLIC BLOOD PRESSURE: 79 MMHG

## 2021-08-27 DIAGNOSIS — O10.919 CHRONIC HYPERTENSION AFFECTING PREGNANCY: ICD-10-CM

## 2021-08-27 DIAGNOSIS — E11.9 TYPE 2 DIABETES MELLITUS WITHOUT COMPLICATION, WITHOUT LONG-TERM CURRENT USE OF INSULIN (HCC): ICD-10-CM

## 2021-08-27 DIAGNOSIS — E11.9 TYPE 2 DIABETES MELLITUS WITHOUT COMPLICATION, WITHOUT LONG-TERM CURRENT USE OF INSULIN (HCC): Primary | ICD-10-CM

## 2021-08-27 PROCEDURE — 93325 DOPPLER ECHO COLOR FLOW MAPG: CPT | Performed by: OBSTETRICS & GYNECOLOGY

## 2021-08-27 PROCEDURE — 76827 ECHO EXAM OF FETAL HEART: CPT | Performed by: OBSTETRICS & GYNECOLOGY

## 2021-08-27 PROCEDURE — 99215 OFFICE O/P EST HI 40 MIN: CPT | Performed by: OBSTETRICS & GYNECOLOGY

## 2021-08-27 PROCEDURE — 76825 ECHO EXAM OF FETAL HEART: CPT | Performed by: OBSTETRICS & GYNECOLOGY

## 2021-08-27 RX ORDER — INSULIN LISPRO 100 [IU]/ML
10 INJECTION, SOLUTION INTRAVENOUS; SUBCUTANEOUS
Qty: 10 ML | Refills: 0 | Status: SHIPPED | OUTPATIENT
Start: 2021-08-27 | End: 2021-09-14

## 2021-08-27 NOTE — TELEPHONE ENCOUNTER
Pt notified with Insulin changes at [facility] appointment and through My Chart

## 2021-08-27 NOTE — PROGRESS NOTES
Pt for fetal echo  Denies pregnancy complaints  States active fetus  Pt states understanding of new insulin recommendations

## 2021-08-27 NOTE — TELEPHONE ENCOUNTER
New regimen:    Levemir 18 units qAM, 30units qPM  Humalog 10 units with breakfast, 8 units with lunch, 20 units with dinner    Ankit Safe. Kendra Nickerson M.D.   Maternal-Fetal Medicine

## 2021-08-27 NOTE — TELEPHONE ENCOUNTER
25w0d  Received BS log for date range 8/20-8/26     Low  High Out of Range   Fasting Blood Sugar 75 110 1/6   Post Breakfast 67 144 1/6   Post Lunch 67  156   2/7   Post Dinner 106 156 5/6      Patient is currently taking     Levemir 18 units qAM, 30units

## 2021-09-07 ENCOUNTER — TELEPHONE (OUTPATIENT)
Dept: PERINATAL CARE | Facility: HOSPITAL | Age: 41
End: 2021-09-07

## 2021-09-07 NOTE — TELEPHONE ENCOUNTER
26w4d  Received BS log for date range  2/28-9/5     Low  High Out of Range   Fasting Blood Sugar 76 117 1/6   Post Breakfast 78 120 0/4   Post Lunch 59 181 1/7   Post Dinner 103 252 2/5 (252 pt did not take insulin 181 pt had pizza     Patient is currently

## 2021-09-07 NOTE — TELEPHONE ENCOUNTER
Continue  Levemir 18 units qAM, 30units qPM  Humalog 10 units with breakfast, 8 units with lunch, 20 units with dinner.

## 2021-09-13 ENCOUNTER — TELEPHONE (OUTPATIENT)
Dept: PERINATAL CARE | Facility: HOSPITAL | Age: 41
End: 2021-09-13

## 2021-09-14 ENCOUNTER — PATIENT MESSAGE (OUTPATIENT)
Dept: PERINATAL CARE | Facility: HOSPITAL | Age: 41
End: 2021-09-14

## 2021-09-14 ENCOUNTER — ROUTINE PRENATAL (OUTPATIENT)
Dept: OBGYN CLINIC | Facility: CLINIC | Age: 41
End: 2021-09-14
Payer: COMMERCIAL

## 2021-09-14 VITALS — BODY MASS INDEX: 38 KG/M2 | WEIGHT: 208 LBS | SYSTOLIC BLOOD PRESSURE: 136 MMHG | DIASTOLIC BLOOD PRESSURE: 82 MMHG

## 2021-09-14 DIAGNOSIS — Z34.83 ENCOUNTER FOR SUPERVISION OF OTHER NORMAL PREGNANCY IN THIRD TRIMESTER: Primary | ICD-10-CM

## 2021-09-14 LAB
APPEARANCE: CLEAR
BILIRUBIN: NEGATIVE
GLUCOSE (URINE DIPSTICK): NEGATIVE MG/DL
KETONES (URINE DIPSTICK): NEGATIVE MG/DL
LEUKOCYTES: NEGATIVE
MULTISTIX LOT#: ABNORMAL NUMERIC
NITRITE, URINE: NEGATIVE
PH, URINE: 7 (ref 4.5–8)
PROTEIN (URINE DIPSTICK): NEGATIVE MG/DL
SPECIFIC GRAVITY: 1.02 (ref 1–1.03)
URINE-COLOR: YELLOW
UROBILINOGEN,SEMI-QN: 0.2 MG/DL (ref 0–1.9)

## 2021-09-14 PROCEDURE — 81002 URINALYSIS NONAUTO W/O SCOPE: CPT | Performed by: OBSTETRICS & GYNECOLOGY

## 2021-09-14 PROCEDURE — 3075F SYST BP GE 130 - 139MM HG: CPT | Performed by: OBSTETRICS & GYNECOLOGY

## 2021-09-14 PROCEDURE — 3079F DIAST BP 80-89 MM HG: CPT | Performed by: OBSTETRICS & GYNECOLOGY

## 2021-09-14 RX ORDER — INSULIN LISPRO 100 [IU]/ML
10 INJECTION, SOLUTION INTRAVENOUS; SUBCUTANEOUS
Qty: 10 ML | Refills: 0 | COMMUNITY
Start: 2021-09-14 | End: 2021-10-05

## 2021-09-14 NOTE — TELEPHONE ENCOUNTER
27w4d  Received BS log for date range 9/7-9/13     Low  High Out of Range   Fasting Blood Sugar 68 111 2/6   Post Breakfast 76 115 0/6   Post Lunch 90 208 2/4 (had chinese take out and bread chips on high days)   Post Dinner 73 166 2/5     Patient is milagro

## 2021-09-14 NOTE — TELEPHONE ENCOUNTER
Blood Glucose flow sheet  reviewed by Dr Cesar Stephenson  through  9/13        Recommendations    Jennie 18 units qAM, 32 units qPM  Humalog 10 units with breakfast, 10 units with lunch, 22 units with dinner    Blood glucose monitoring   With weekly review by ANAHY

## 2021-09-14 NOTE — PROGRESS NOTES
Pregnancy counseling. Good fm. Pt stated mfm just increased her insulin regimen. Pt has ultrasounds 9/21 and 10/19 and 11/16.

## 2021-09-14 NOTE — TELEPHONE ENCOUNTER
New regimen:    Levemir 18 units qAM, 32 units qPM  Humalog 10 units with breakfast, 10 units with lunch, 22 units with dinner    Mary Garza. Lilliam Mendez M.D.   Maternal-Fetal Medicine

## 2021-09-16 ENCOUNTER — TELEPHONE (OUTPATIENT)
Dept: OBGYN CLINIC | Facility: CLINIC | Age: 41
End: 2021-09-16

## 2021-09-16 NOTE — TELEPHONE ENCOUNTER
----- Message from Myles Lane MD sent at 9/16/2021  9:53 AM CDT -----  Trace blood noted. On review of chart,  trace blood has been noted before. Please inform and help pt make an appt with Urology, dr Juliann Cabrera or Gavino Oliveira for a consult.

## 2021-09-17 ENCOUNTER — TELEPHONE (OUTPATIENT)
Dept: OBGYN CLINIC | Facility: CLINIC | Age: 41
End: 2021-09-17

## 2021-09-17 NOTE — TELEPHONE ENCOUNTER
LMTCB      ----- Message from Floyde Bosworth, MD sent at 9/16/2021  9:53 AM CDT -----  Trace blood noted. On review of chart,  trace blood has been noted before.   Please inform and help pt make an appt with Urology, dr James Lynch or Blanquita Gomez for a consu

## 2021-09-21 ENCOUNTER — TELEPHONE (OUTPATIENT)
Dept: PERINATAL CARE | Facility: HOSPITAL | Age: 41
End: 2021-09-21

## 2021-09-21 ENCOUNTER — HOSPITAL ENCOUNTER (OUTPATIENT)
Dept: PERINATAL CARE | Facility: HOSPITAL | Age: 41
Discharge: HOME | End: 2021-09-21
Attending: OBSTETRICS & GYNECOLOGY
Payer: COMMERCIAL

## 2021-09-21 VITALS
DIASTOLIC BLOOD PRESSURE: 83 MMHG | SYSTOLIC BLOOD PRESSURE: 136 MMHG | BODY MASS INDEX: 38 KG/M2 | HEART RATE: 105 BPM | WEIGHT: 208 LBS

## 2021-09-21 DIAGNOSIS — O10.919 CHRONIC HYPERTENSION AFFECTING PREGNANCY: ICD-10-CM

## 2021-09-21 DIAGNOSIS — O09.523 ADVANCED MATERNAL AGE IN MULTIGRAVIDA, THIRD TRIMESTER: ICD-10-CM

## 2021-09-21 DIAGNOSIS — O24.419 GDM, CLASS A2: ICD-10-CM

## 2021-09-21 DIAGNOSIS — O24.419 GDM, CLASS A2: Primary | ICD-10-CM

## 2021-09-21 PROCEDURE — 99214 OFFICE O/P EST MOD 30 MIN: CPT | Performed by: OBSTETRICS & GYNECOLOGY

## 2021-09-21 PROCEDURE — 76816 OB US FOLLOW-UP PER FETUS: CPT | Performed by: OBSTETRICS & GYNECOLOGY

## 2021-09-21 NOTE — TELEPHONE ENCOUNTER
28w4d  Received BS log for date range 9/14-9/19       Low  High Out of Range   Fasting Blood Sugar 78 97 1/6   Post Breakfast 79 133 1/5   Post Lunch 84 116 0/4   Post Dinner 112 159 3/5          CHANGE:     Levemir 18 units qAM, 32 units qPM  Humalog 10 u

## 2021-09-21 NOTE — PROGRESS NOTES
Outpatient Maternal-Fetal Medicine Follow-Up     Dear Fernando Peterson     Thank you for requesting an ultrasound and maternal-fetal medicine consultation on your patient April Harrison Lesser you are aware she is a 47year old female  with a keen p MATERNAL AGE  See prior MFM notes for a detailed review. She did not desire invasive genetic testing.    She has already obtained a low-risk NPIT result and was appropriately reassured.      PRIOR MYOMECTOMY  See prior MFM notes for a detailed review.

## 2021-09-21 NOTE — TELEPHONE ENCOUNTER
Blood Glucose flow sheet  reviewed  By Dr Karen Valente qAM, 32 units qPM  Humalog 10 units with breakfast, 10 units with lunch, 24 units with dinner    Blood glucose monitoring   With weekly review by ANAHY

## 2021-09-21 NOTE — TELEPHONE ENCOUNTER
28w4d  Received BS log for date range 9/14-9/19     Low  High Out of Range   Fasting Blood Sugar 78 97 1/6   Post Breakfast 79 133 1/5   Post Lunch 84 116 0/4   Post Dinner 112 159 3/5     Patient is currently taking     Levemir 18 units qAM, 32 units qPM

## 2021-09-23 NOTE — TELEPHONE ENCOUNTER
Pt called and informed of results and recommendations. Pt voices understanding. Pt voices she would like to talk further about the hematuria at her next visit with Neil Alvarado.

## 2021-09-28 ENCOUNTER — TELEPHONE (OUTPATIENT)
Dept: PERINATAL CARE | Facility: HOSPITAL | Age: 41
End: 2021-09-28

## 2021-09-28 ENCOUNTER — ROUTINE PRENATAL (OUTPATIENT)
Dept: OBGYN CLINIC | Facility: CLINIC | Age: 41
End: 2021-09-28
Payer: COMMERCIAL

## 2021-09-28 VITALS — WEIGHT: 212.63 LBS | BODY MASS INDEX: 39 KG/M2 | SYSTOLIC BLOOD PRESSURE: 122 MMHG | DIASTOLIC BLOOD PRESSURE: 62 MMHG

## 2021-09-28 DIAGNOSIS — Z34.83 ENCOUNTER FOR SUPERVISION OF OTHER NORMAL PREGNANCY IN THIRD TRIMESTER: Primary | ICD-10-CM

## 2021-09-28 PROBLEM — R31.21 ASYMPTOMATIC MICROSCOPIC HEMATURIA: Status: ACTIVE | Noted: 2021-09-28

## 2021-09-28 LAB
APPEARANCE: CLEAR
BILIRUBIN: NEGATIVE
GLUCOSE (URINE DIPSTICK): NEGATIVE MG/DL
KETONES (URINE DIPSTICK): 15 MG/DL
LEUKOCYTES: NEGATIVE
MULTISTIX LOT#: ABNORMAL NUMERIC
NITRITE, URINE: NEGATIVE
PH, URINE: 7 (ref 4.5–8)
PROTEIN (URINE DIPSTICK): NEGATIVE MG/DL
SPECIFIC GRAVITY: 1.02 (ref 1–1.03)
URINE-COLOR: YELLOW
UROBILINOGEN,SEMI-QN: 0.2 MG/DL (ref 0–1.9)

## 2021-09-28 PROCEDURE — 81002 URINALYSIS NONAUTO W/O SCOPE: CPT | Performed by: OBSTETRICS & GYNECOLOGY

## 2021-09-28 PROCEDURE — 3078F DIAST BP <80 MM HG: CPT | Performed by: OBSTETRICS & GYNECOLOGY

## 2021-09-28 PROCEDURE — 3074F SYST BP LT 130 MM HG: CPT | Performed by: OBSTETRICS & GYNECOLOGY

## 2021-09-28 NOTE — PROGRESS NOTES
Good fm. Per pt blood sugars are stable. Pt is being monitored by MFM weekly. Pt with trace blood in urine, this was present intermittently and also prior to this pregnancy.   Pt counseled and advised consult with Urology, pt agreed and will contact pc

## 2021-10-01 ENCOUNTER — TELEPHONE (OUTPATIENT)
Dept: PERINATAL CARE | Facility: HOSPITAL | Age: 41
End: 2021-10-01

## 2021-10-01 NOTE — TELEPHONE ENCOUNTER
LVMW#TCB with questions    Called for pt to document Blood Glucoses for MD review in her My chart Saundra

## 2021-10-05 ENCOUNTER — TELEPHONE (OUTPATIENT)
Dept: PERINATAL CARE | Facility: HOSPITAL | Age: 41
End: 2021-10-05

## 2021-10-05 RX ORDER — INSULIN LISPRO 100 [IU]/ML
10 INJECTION, SOLUTION INTRAVENOUS; SUBCUTANEOUS
Qty: 10 ML | Refills: 0 | COMMUNITY
Start: 2021-10-05 | End: 2021-11-25

## 2021-10-05 NOTE — TELEPHONE ENCOUNTER
30w4d  Received BS log for date range 9/26-10/3      Low  High Out of Range   Fasting Blood Sugar 80 123 4/7   Post Breakfast 107 135 3/5   Post Lunch 99 125 1/3   Post Dinner 102 173 [place]) 3/5     Patient is currently taking     Alveria Benigno

## 2021-10-05 NOTE — TELEPHONE ENCOUNTER
New regimen:    Levemir 18 units qAM, 36 units qPM  Humalog 14 units with breakfast, 10 units with lunch, 28 units with dinner    Ankit Safe. Kendra Nickerson M.D.   Maternal-Fetal Medicine

## 2021-10-12 ENCOUNTER — TELEPHONE (OUTPATIENT)
Dept: PERINATAL CARE | Facility: HOSPITAL | Age: 41
End: 2021-10-12

## 2021-10-12 ENCOUNTER — ROUTINE PRENATAL (OUTPATIENT)
Dept: OBGYN CLINIC | Facility: CLINIC | Age: 41
End: 2021-10-12

## 2021-10-12 VITALS — DIASTOLIC BLOOD PRESSURE: 68 MMHG | BODY MASS INDEX: 39 KG/M2 | SYSTOLIC BLOOD PRESSURE: 122 MMHG | WEIGHT: 211 LBS

## 2021-10-12 DIAGNOSIS — Z34.83 ENCOUNTER FOR SUPERVISION OF OTHER NORMAL PREGNANCY IN THIRD TRIMESTER: Primary | ICD-10-CM

## 2021-10-12 PROBLEM — O34.29 PREGNANCY WITH HISTORY OF UTERINE MYOMECTOMY (HCC): Status: ACTIVE | Noted: 2021-10-12

## 2021-10-12 PROBLEM — O34.29 PREGNANCY WITH HISTORY OF UTERINE MYOMECTOMY: Status: ACTIVE | Noted: 2021-10-12

## 2021-10-12 PROCEDURE — 90686 IIV4 VACC NO PRSV 0.5 ML IM: CPT | Performed by: NURSE PRACTITIONER

## 2021-10-12 PROCEDURE — 81002 URINALYSIS NONAUTO W/O SCOPE: CPT | Performed by: NURSE PRACTITIONER

## 2021-10-12 PROCEDURE — 90471 IMMUNIZATION ADMIN: CPT | Performed by: NURSE PRACTITIONER

## 2021-10-12 PROCEDURE — 90715 TDAP VACCINE 7 YRS/> IM: CPT | Performed by: NURSE PRACTITIONER

## 2021-10-12 PROCEDURE — 3074F SYST BP LT 130 MM HG: CPT | Performed by: NURSE PRACTITIONER

## 2021-10-12 PROCEDURE — 3078F DIAST BP <80 MM HG: CPT | Performed by: NURSE PRACTITIONER

## 2021-10-12 PROCEDURE — 90472 IMMUNIZATION ADMIN EACH ADD: CPT | Performed by: NURSE PRACTITIONER

## 2021-10-12 RX ORDER — BREAST PUMP
EACH MISCELLANEOUS
Qty: 1 EACH | Refills: 0 | Status: CANCELLED | OUTPATIENT
Start: 2021-10-12

## 2021-10-12 RX ORDER — BREAST PUMP
EACH MISCELLANEOUS
Qty: 1 EACH | Refills: 0 | OUTPATIENT
Start: 2021-10-12

## 2021-10-12 NOTE — PROGRESS NOTES
[facility], [facility]  Obstetrics and Gynecology  28 Week Prenatal Visit  Raimundo Okeefe, MSN, APRN, FNP-BC      HPI   April R Merritt Necessary is a 47year old.  31w4d weeks. FABY 12/10/2021    Here today with ABDIRIZAK Diego for OB education visit.  Has a history of my 10 mL 0   • insulin detemir 100 UNIT/ML Subcutaneous Solution Pen-injector Pt to take  18 Units in AM and 36 Unitis at HS 10 mL 5   • metoprolol succinate 50 MG Oral Tablet 24 Hr Take 1 tablet (50 mg total) by mouth daily.  60 tablet 2   • Montelukast Sodiu Reported on 10/12/2021) 1 kit 0       PHYSICAL EXAM   /68   Wt 211 lb (95.7 kg)   LMP 2021 (Exact Date)   BMI 38.59 kg/m²     See flow sheet for FHT's and fundal assessment.     ASSESSMENT   April is a 47year old female  with viable IUP staff, masking of staff, mask requirements for patient and visitor.  - Growth ultrasound and BPPs monthly at 28 weeks. Next appointment 10/16 at [facility]. - Pt to follow up with Dr. Thaddeus Lrema  in 2 weeks. - All questions answered. Pt verbalizes understanding.

## 2021-10-14 ENCOUNTER — TELEPHONE (OUTPATIENT)
Dept: PERINATAL CARE | Facility: HOSPITAL | Age: 41
End: 2021-10-14

## 2021-10-19 ENCOUNTER — HOSPITAL ENCOUNTER (OUTPATIENT)
Dept: PERINATAL CARE | Facility: HOSPITAL | Age: 41
Discharge: HOME | End: 2021-10-19
Attending: OBSTETRICS & GYNECOLOGY
Payer: COMMERCIAL

## 2021-10-19 ENCOUNTER — TELEPHONE (OUTPATIENT)
Dept: OBGYN CLINIC | Facility: CLINIC | Age: 41
End: 2021-10-19

## 2021-10-19 ENCOUNTER — TELEPHONE (OUTPATIENT)
Dept: PERINATAL CARE | Facility: HOSPITAL | Age: 41
End: 2021-10-19

## 2021-10-19 VITALS
SYSTOLIC BLOOD PRESSURE: 127 MMHG | HEART RATE: 101 BPM | DIASTOLIC BLOOD PRESSURE: 74 MMHG | WEIGHT: 211 LBS | BODY MASS INDEX: 39 KG/M2

## 2021-10-19 DIAGNOSIS — O10.919 CHRONIC HYPERTENSION AFFECTING PREGNANCY: ICD-10-CM

## 2021-10-19 DIAGNOSIS — Z34.83 ENCOUNTER FOR SUPERVISION OF OTHER NORMAL PREGNANCY IN THIRD TRIMESTER: ICD-10-CM

## 2021-10-19 DIAGNOSIS — E11.9 TYPE 2 DIABETES MELLITUS WITHOUT COMPLICATION, WITHOUT LONG-TERM CURRENT USE OF INSULIN (HCC): ICD-10-CM

## 2021-10-19 DIAGNOSIS — O09.521 MULTIGRAVIDA OF ADVANCED MATERNAL AGE IN FIRST TRIMESTER: ICD-10-CM

## 2021-10-19 DIAGNOSIS — O24.419 GDM, CLASS A2: Primary | ICD-10-CM

## 2021-10-19 DIAGNOSIS — O34.29 PREGNANCY WITH HISTORY OF UTERINE MYOMECTOMY: ICD-10-CM

## 2021-10-19 DIAGNOSIS — O24.419 GDM, CLASS A2: ICD-10-CM

## 2021-10-19 PROCEDURE — 76819 FETAL BIOPHYS PROFIL W/O NST: CPT | Performed by: OBSTETRICS & GYNECOLOGY

## 2021-10-19 PROCEDURE — 76816 OB US FOLLOW-UP PER FETUS: CPT | Performed by: OBSTETRICS & GYNECOLOGY

## 2021-10-19 PROCEDURE — 99213 OFFICE O/P EST LOW 20 MIN: CPT | Performed by: OBSTETRICS & GYNECOLOGY

## 2021-10-19 NOTE — PROGRESS NOTES
Outpatient Maternal-Fetal Medicine Follow-Up     Dear Mitesh aMo     Thank you for requesting an ultrasound and maternal-fetal medicine consultation on your patient April Piper James you are aware she is a 47year old female  with a keen p MATERNAL AGE  See prior MFM notes for a detailed review. She did not desire invasive genetic testing.    She has already obtained a low-risk NPIT result and was appropriately reassured.      PRIOR MYOMECTOMY  See prior MFM notes for a detailed review.

## 2021-10-19 NOTE — TELEPHONE ENCOUNTER
Referral was placed on 10/12, for  DME without any additional information. Place advise what DME equipment patient is needing. .Thank you, Kris Baxter Specialist    Managed Care.

## 2021-10-25 ENCOUNTER — PATIENT MESSAGE (OUTPATIENT)
Dept: OBGYN CLINIC | Facility: CLINIC | Age: 41
End: 2021-10-25

## 2021-10-25 NOTE — TELEPHONE ENCOUNTER
Patient is about 33 weeks and this sound like ligament pain that is \"normal\" at this time. We recommend using a Maternity Belt, hydration and may take Tylenol for discomfort.   Patient should talk with her work to see if modifications can be made for her

## 2021-10-26 ENCOUNTER — TELEPHONE (OUTPATIENT)
Dept: OBGYN CLINIC | Facility: CLINIC | Age: 41
End: 2021-10-26

## 2021-10-26 ENCOUNTER — TELEPHONE (OUTPATIENT)
Dept: PERINATAL CARE | Facility: HOSPITAL | Age: 41
End: 2021-10-26

## 2021-10-26 ENCOUNTER — HOSPITAL ENCOUNTER (OUTPATIENT)
Dept: PERINATAL CARE | Facility: HOSPITAL | Age: 41
Discharge: HOME | End: 2021-10-26
Attending: OBSTETRICS & GYNECOLOGY
Payer: COMMERCIAL

## 2021-10-26 ENCOUNTER — ROUTINE PRENATAL (OUTPATIENT)
Dept: OBGYN CLINIC | Facility: CLINIC | Age: 41
End: 2021-10-26

## 2021-10-26 VITALS — WEIGHT: 215 LBS | BODY MASS INDEX: 39 KG/M2 | SYSTOLIC BLOOD PRESSURE: 124 MMHG | DIASTOLIC BLOOD PRESSURE: 68 MMHG

## 2021-10-26 DIAGNOSIS — Z01.818 PREOP TESTING: Primary | ICD-10-CM

## 2021-10-26 DIAGNOSIS — O09.521 MULTIGRAVIDA OF ADVANCED MATERNAL AGE IN FIRST TRIMESTER: ICD-10-CM

## 2021-10-26 DIAGNOSIS — E11.9 TYPE 2 DIABETES MELLITUS WITHOUT COMPLICATION (HCC): ICD-10-CM

## 2021-10-26 DIAGNOSIS — Z34.83 ENCOUNTER FOR SUPERVISION OF OTHER NORMAL PREGNANCY IN THIRD TRIMESTER: Primary | ICD-10-CM

## 2021-10-26 DIAGNOSIS — O24.419 GDM, CLASS A2: Primary | ICD-10-CM

## 2021-10-26 PROCEDURE — 59025 FETAL NON-STRESS TEST: CPT

## 2021-10-26 PROCEDURE — 3078F DIAST BP <80 MM HG: CPT | Performed by: OBSTETRICS & GYNECOLOGY

## 2021-10-26 PROCEDURE — 3074F SYST BP LT 130 MM HG: CPT | Performed by: OBSTETRICS & GYNECOLOGY

## 2021-10-26 NOTE — TELEPHONE ENCOUNTER
33w4d  Received BS log for date range 10/20-10/26     Low  High Out of Range   Fasting Blood Sugar 79 122 4/5   Post Breakfast 99 104 0/4   Post Lunch 86 140 1/2   Post Dinner 74 109 0/4     Patient is currently taking     Levemir 18 units qAM, 36 units qP

## 2021-10-26 NOTE — TELEPHONE ENCOUNTER
Please schedule the following surgery:    Procedure:  Primary Low Transverse    Assist: (Y/N or none) Yes  Date: Friday, 2021 @ 9:30 AM.  Dr Beti Machuca is On-Call. Dx:  IUP- 37 weeks. H/O Multiple Myomectomies.   Pre-op appt: (Y/N or n/a) N/A  A

## 2021-10-26 NOTE — TELEPHONE ENCOUNTER
33w4d  Received BS log for date range 10/20-10/26       Low  High Out of Range   Fasting Blood Sugar 79 122 4/5   Post Breakfast 99 104 0/4   Post Lunch 86 140 1/2   Post Dinner 74 109 0/4      CHANGE:  Levemir 18 units qAM, 38 units qPM  Humalog 14 units

## 2021-10-26 NOTE — TELEPHONE ENCOUNTER
Referral has been sent to health [facility] for review. Thank you, Bobbi Kraft Specialist    [facility] Care. 1.	ESRD on HD  2.	Diabetes  3.	Hypertension  4.	s/p fall, ankle fracture, s/p closed reduction    HD today. To continue HD TIW as scheduled. Fluid removal as tolerated by BP.   Dietary and PO fluid restriction. Epogen as needed for anemia. Ortho follow up.   Monitor blood sugar levels. Insulin coverage as needed. Dietary restriction.   Monitor BP trend. Titrate BP meds as needed. Salt restriction.

## 2021-10-26 NOTE — TELEPHONE ENCOUNTER
My chart message sent with new Insulin recommendations      Levemir 18 units qAM, 38 units qPM  Humalog 14 units with breakfast, 10 units with lunch, 28 units with dinner

## 2021-10-26 NOTE — PROGRESS NOTES
No C/Os. Going Twice weekly for NSTs due to A2 DM. BP controlled. Scheduled for Primary LTCS on Friday, 11/19/2021 @ 9:30 AM due to H/O Multiple Myomectomies. Patient still deciding on Elective Sterilization.   Procedure of Bilateral Salpingectomy discu

## 2021-10-27 ENCOUNTER — NST DOCUMENTATION (OUTPATIENT)
Dept: OBGYN CLINIC | Facility: CLINIC | Age: 41
End: 2021-10-27

## 2021-10-27 ENCOUNTER — TELEPHONE (OUTPATIENT)
Dept: OBGYN CLINIC | Facility: CLINIC | Age: 41
End: 2021-10-27

## 2021-10-27 DIAGNOSIS — O24.419 GDM, CLASS A2: ICD-10-CM

## 2021-10-27 DIAGNOSIS — O09.529 ANTEPARTUM MULTIGRAVIDA OF ADVANCED MATERNAL AGE: ICD-10-CM

## 2021-10-27 PROCEDURE — 59025 FETAL NON-STRESS TEST: CPT | Performed by: OBSTETRICS & GYNECOLOGY

## 2021-10-27 NOTE — NST
Nonstress Test   Patient: April R Jackson    Gestation: 33w5d    Diagnosis from order:         NST:         NST DOCUMENTATION 10/26/2021   Variability 6-25 BPM   Accelerations Yes   Decelerations None   Baseline 130   Uterine Irritability No   Contractions

## 2021-10-27 NOTE — TELEPHONE ENCOUNTER
MLM and JF have office hours. ASJ on vacation.    msg sent to Dong Dunn from [employer] to check for an assist.

## 2021-10-29 ENCOUNTER — APPOINTMENT (OUTPATIENT)
Dept: OBGYN CLINIC | Facility: HOSPITAL | Age: 41
End: 2021-10-29
Payer: COMMERCIAL

## 2021-10-29 ENCOUNTER — HOSPITAL ENCOUNTER (OUTPATIENT)
Facility: HOSPITAL | Age: 41
Discharge: HOME | End: 2021-10-29
Attending: OBSTETRICS & GYNECOLOGY | Admitting: OBSTETRICS & GYNECOLOGY
Payer: COMMERCIAL

## 2021-10-29 ENCOUNTER — NST DOCUMENTATION (OUTPATIENT)
Dept: OBGYN CLINIC | Facility: CLINIC | Age: 41
End: 2021-10-29

## 2021-10-29 PROCEDURE — 59025 FETAL NON-STRESS TEST: CPT

## 2021-10-29 PROCEDURE — 59025 FETAL NON-STRESS TEST: CPT | Performed by: OBSTETRICS & GYNECOLOGY

## 2021-10-30 NOTE — NST
Nonstress Test   Patient: April R Jackson    Gestation: 34w0d    Diagnosis from order: AMA       NST: reactive         NST DOCUMENTATION 10/29/2021   Variability 6-25 BPM   Accelerations Yes   Decelerations None   Baseline 135   Uterine Irritability No   C

## 2021-10-30 NOTE — PROGRESS NOTES
Pt is a 46year old female admitted to TR1/TR1-A. Patient presents with:  Non-stress Test     Pt is  34w0d intra-uterine pregnancy. History obtained, consents signed. Oriented to room, staff, and plan of care.

## 2021-11-02 ENCOUNTER — HOSPITAL ENCOUNTER (OUTPATIENT)
Dept: PERINATAL CARE | Facility: HOSPITAL | Age: 41
Discharge: HOME | End: 2021-11-02
Attending: OBSTETRICS & GYNECOLOGY
Payer: COMMERCIAL

## 2021-11-02 ENCOUNTER — NST DOCUMENTATION (OUTPATIENT)
Dept: OBGYN CLINIC | Facility: CLINIC | Age: 41
End: 2021-11-02

## 2021-11-02 ENCOUNTER — TELEPHONE (OUTPATIENT)
Dept: PERINATAL CARE | Facility: HOSPITAL | Age: 41
End: 2021-11-02

## 2021-11-02 DIAGNOSIS — O24.419 GDM, CLASS A2: Primary | ICD-10-CM

## 2021-11-02 DIAGNOSIS — O09.521 MULTIGRAVIDA OF ADVANCED MATERNAL AGE IN FIRST TRIMESTER: ICD-10-CM

## 2021-11-02 DIAGNOSIS — O10.919 CHRONIC HYPERTENSION AFFECTING PREGNANCY: ICD-10-CM

## 2021-11-02 PROCEDURE — 59025 FETAL NON-STRESS TEST: CPT | Performed by: OBSTETRICS & GYNECOLOGY

## 2021-11-02 PROCEDURE — 59025 FETAL NON-STRESS TEST: CPT

## 2021-11-02 NOTE — TELEPHONE ENCOUNTER
34w4d  Received BS log for date range 10/27-11/2     Low  High Out of Range   Fasting Blood Sugar 73 106 2/5   Post Breakfast 82 123 1/3   Post Lunch 73 152 2/5   Post Dinner 120 158 3/4     Patient is currently taking     Levemir 18 units qAM, 38 units qP

## 2021-11-03 NOTE — NST
Nonstress Test   Patient: April R Jackson    Gestation: 34w4d    Diagnosis from order:         NST:         NST DOCUMENTATION 11/2/2021   Variability 6-25 BPM   Accelerations Yes   Decelerations None   Baseline 120   Uterine Irritability No   Contractions

## 2021-11-04 NOTE — TELEPHONE ENCOUNTER
msg left for [facility] with [employer] to check status on an assist for this case.

## 2021-11-05 ENCOUNTER — HOSPITAL ENCOUNTER (OUTPATIENT)
Facility: HOSPITAL | Age: 41
Discharge: HOME | End: 2021-11-05
Attending: OBSTETRICS & GYNECOLOGY | Admitting: OBSTETRICS & GYNECOLOGY
Payer: COMMERCIAL

## 2021-11-05 ENCOUNTER — NST DOCUMENTATION (OUTPATIENT)
Dept: OBGYN CLINIC | Facility: CLINIC | Age: 41
End: 2021-11-05

## 2021-11-05 ENCOUNTER — APPOINTMENT (OUTPATIENT)
Dept: OBGYN CLINIC | Facility: HOSPITAL | Age: 41
End: 2021-11-05
Payer: COMMERCIAL

## 2021-11-05 VITALS — SYSTOLIC BLOOD PRESSURE: 138 MMHG | DIASTOLIC BLOOD PRESSURE: 83 MMHG | HEART RATE: 109 BPM

## 2021-11-05 DIAGNOSIS — E11.9 TYPE 2 DIABETES MELLITUS WITHOUT COMPLICATION, WITHOUT LONG-TERM CURRENT USE OF INSULIN (HCC): ICD-10-CM

## 2021-11-05 DIAGNOSIS — O10.919 CHRONIC HYPERTENSION AFFECTING PREGNANCY: ICD-10-CM

## 2021-11-05 PROCEDURE — 59025 FETAL NON-STRESS TEST: CPT | Performed by: OBSTETRICS & GYNECOLOGY

## 2021-11-05 PROCEDURE — 59025 FETAL NON-STRESS TEST: CPT

## 2021-11-06 NOTE — NST
Nonstress Test   Patient: April R Jackson    Gestation: 35w0d    Diagnosis from order: Inpatient order, no diagnosis associated       NST:         NST DOCUMENTATION 11/5/2021   Variability 6-25 BPM   Accelerations Yes   Decelerations None   Baseline 125

## 2021-11-08 RX ORDER — MONTELUKAST SODIUM 10 MG/1
10 TABLET ORAL NIGHTLY
Qty: 90 TABLET | Refills: 0 | Status: SHIPPED | OUTPATIENT
Start: 2021-11-08 | End: 2021-11-25

## 2021-11-08 NOTE — TELEPHONE ENCOUNTER
Per Jeffrey Dubon, OP group is unavailable to assist. Spoke with Lisa at [facility] to check for an assist. Lisa to call back with an answer.

## 2021-11-09 ENCOUNTER — TELEPHONE (OUTPATIENT)
Dept: PERINATAL CARE | Facility: HOSPITAL | Age: 41
End: 2021-11-09

## 2021-11-09 ENCOUNTER — ROUTINE PRENATAL (OUTPATIENT)
Dept: OBGYN CLINIC | Facility: CLINIC | Age: 41
End: 2021-11-09

## 2021-11-09 ENCOUNTER — HOSPITAL ENCOUNTER (OUTPATIENT)
Dept: PERINATAL CARE | Facility: HOSPITAL | Age: 41
Discharge: HOME | End: 2021-11-09
Attending: OBSTETRICS & GYNECOLOGY
Payer: COMMERCIAL

## 2021-11-09 ENCOUNTER — LAB ENCOUNTER (OUTPATIENT)
Dept: LAB | Facility: HOSPITAL | Age: 41
End: 2021-11-09
Attending: OBSTETRICS & GYNECOLOGY
Payer: COMMERCIAL

## 2021-11-09 VITALS — DIASTOLIC BLOOD PRESSURE: 68 MMHG | SYSTOLIC BLOOD PRESSURE: 130 MMHG | WEIGHT: 215.81 LBS | BODY MASS INDEX: 39 KG/M2

## 2021-11-09 DIAGNOSIS — O09.521 MULTIGRAVIDA OF ADVANCED MATERNAL AGE IN FIRST TRIMESTER: ICD-10-CM

## 2021-11-09 DIAGNOSIS — Z34.83 ENCOUNTER FOR SUPERVISION OF OTHER NORMAL PREGNANCY IN THIRD TRIMESTER: Primary | ICD-10-CM

## 2021-11-09 DIAGNOSIS — Z34.82 ENCOUNTER FOR SUPERVISION OF OTHER NORMAL PREGNANCY IN SECOND TRIMESTER: ICD-10-CM

## 2021-11-09 DIAGNOSIS — Z34.83 ENCOUNTER FOR SUPERVISION OF OTHER NORMAL PREGNANCY IN THIRD TRIMESTER: ICD-10-CM

## 2021-11-09 DIAGNOSIS — O24.419 GDM, CLASS A2: Primary | ICD-10-CM

## 2021-11-09 PROCEDURE — 36415 COLL VENOUS BLD VENIPUNCTURE: CPT

## 2021-11-09 PROCEDURE — 85025 COMPLETE CBC W/AUTO DIFF WBC: CPT

## 2021-11-09 PROCEDURE — 87389 HIV-1 AG W/HIV-1&-2 AB AG IA: CPT

## 2021-11-09 PROCEDURE — 81002 URINALYSIS NONAUTO W/O SCOPE: CPT | Performed by: OBSTETRICS & GYNECOLOGY

## 2021-11-09 PROCEDURE — 3078F DIAST BP <80 MM HG: CPT | Performed by: OBSTETRICS & GYNECOLOGY

## 2021-11-09 PROCEDURE — 86780 TREPONEMA PALLIDUM: CPT

## 2021-11-09 PROCEDURE — 82105 ALPHA-FETOPROTEIN SERUM: CPT

## 2021-11-09 PROCEDURE — 3075F SYST BP GE 130 - 139MM HG: CPT | Performed by: OBSTETRICS & GYNECOLOGY

## 2021-11-09 PROCEDURE — 59025 FETAL NON-STRESS TEST: CPT

## 2021-11-09 NOTE — TELEPHONE ENCOUNTER
No change - continue current regimen. Continue Insulin  Levemir 18 units qAM, 38 units qPM  Humalog 14 units with breakfast, 10 units with lunch, 32 units with dinner    Oziel Issa. Andrew Giordano M.D.   Maternal-Fetal Medicine

## 2021-11-09 NOTE — PROGRESS NOTES
Patient has  scheduled for Friday, 20. Declines Elective Sterilization. Has NST today. Having difficulty working. Note given to stop working due to Advanced Pregnancy State.

## 2021-11-09 NOTE — TELEPHONE ENCOUNTER
35w4d  Received BS log for date range 11/3-11/9      Low  High Out of Range   Fasting Blood Sugar 71 99 1/7   Post Breakfast 67 122 1/4   Post Lunch 62 99 0/4   Post Dinner 59 192 1/3     Patient is currently taking       Levemir 18 units qAM, 38 units qPM

## 2021-11-10 ENCOUNTER — NST DOCUMENTATION (OUTPATIENT)
Dept: OBGYN CLINIC | Facility: CLINIC | Age: 41
End: 2021-11-10

## 2021-11-10 PROCEDURE — 59025 FETAL NON-STRESS TEST: CPT | Performed by: OBSTETRICS & GYNECOLOGY

## 2021-11-10 NOTE — NST
Nonstress Test   Patient: April R Jackson    Gestation: 35w5d    Diagnosis from order:  AMA;  Diabetes mellitus       NST: reactive         NST DOCUMENTATION 11/9/2021   Variability 6-25 BPM   Accelerations Yes   Decelerations None   Baseline 125   Uterine

## 2021-11-15 NOTE — TELEPHONE ENCOUNTER
Spoke with pt in regards to details for FMLA forms for spouse, pt states spouse will contact HR to clarify in regards to forms and pt will call to provide details.

## 2021-11-15 NOTE — PROGRESS NOTES
Outpatient Maternal-Fetal Medicine Follow-Up     Dear Fernando Peterson     Thank you for requesting an ultrasound and maternal-fetal medicine consultation on your patient April Newbern Lesser you are aware she is a 47year old female  with a keen p recommendations for serial growth ultrasounds and antepartum testing.     OBESITY  See prior MFM notes for a detailed review.     ADVANCED MATERNAL AGE  See prior M notes for a detailed review. She did not desire invasive genetic testing.    She has alre consultation, documentation and coordination of care. The relevant clinical matter(s) are summarized above. Note to patient and family  The [facility] makes medical notes available to patients in the interest of transparency.   However, ple

## 2021-11-16 ENCOUNTER — HOSPITAL ENCOUNTER (OUTPATIENT)
Dept: PERINATAL CARE | Facility: HOSPITAL | Age: 41
Discharge: HOME | End: 2021-11-16
Attending: OBSTETRICS & GYNECOLOGY
Payer: COMMERCIAL

## 2021-11-16 ENCOUNTER — TELEPHONE (OUTPATIENT)
Dept: PERINATAL CARE | Facility: HOSPITAL | Age: 41
End: 2021-11-16

## 2021-11-16 ENCOUNTER — TELEPHONE (OUTPATIENT)
Dept: OBGYN CLINIC | Facility: CLINIC | Age: 41
End: 2021-11-16

## 2021-11-16 VITALS
DIASTOLIC BLOOD PRESSURE: 84 MMHG | HEART RATE: 94 BPM | SYSTOLIC BLOOD PRESSURE: 138 MMHG | WEIGHT: 215 LBS | BODY MASS INDEX: 39 KG/M2

## 2021-11-16 DIAGNOSIS — O24.419 GDM, CLASS A2: ICD-10-CM

## 2021-11-16 DIAGNOSIS — R31.21 ASYMPTOMATIC MICROSCOPIC HEMATURIA: ICD-10-CM

## 2021-11-16 DIAGNOSIS — O10.919 CHRONIC HYPERTENSION AFFECTING PREGNANCY: ICD-10-CM

## 2021-11-16 DIAGNOSIS — O09.521 MULTIGRAVIDA OF ADVANCED MATERNAL AGE IN FIRST TRIMESTER: ICD-10-CM

## 2021-11-16 DIAGNOSIS — O09.521 MULTIGRAVIDA OF ADVANCED MATERNAL AGE IN FIRST TRIMESTER: Primary | ICD-10-CM

## 2021-11-16 PROCEDURE — 76819 FETAL BIOPHYS PROFIL W/O NST: CPT | Performed by: OBSTETRICS & GYNECOLOGY

## 2021-11-16 PROCEDURE — 76816 OB US FOLLOW-UP PER FETUS: CPT | Performed by: OBSTETRICS & GYNECOLOGY

## 2021-11-16 PROCEDURE — 99213 OFFICE O/P EST LOW 20 MIN: CPT | Performed by: OBSTETRICS & GYNECOLOGY

## 2021-11-16 NOTE — TELEPHONE ENCOUNTER
Patient scheduled for primary CS Friday.   Please have pt aware to take 1/2 dose of her bedtime insulin and be NPO for at least 6 hours prior to surgery which is scheduled for 9:30 am.

## 2021-11-16 NOTE — TELEPHONE ENCOUNTER
36w4d  Received BS log for date range   11/9-11/16    Pt reports     Low  High Out of Range   Fasting Blood Sugar 70 173 2/6   Post Breakfast 67 158 1/4   Post Lunch 67 120 0/4   Post Dinner 102 116 0/4     Patient is currently taking     Levemir 18 units

## 2021-11-16 NOTE — TELEPHONE ENCOUNTER
Dr. Valery Barrios) has agreed to assist this case. Antonella Lewis in [facility] notified.

## 2021-11-16 NOTE — H&P
[facility] [place]    History & Physical     Dennis Patient Status:  Surgery Admit Riccardo Bryan    10/24/1980 MRN [id number]   Location [address]  Attending Myron Foster MD   Hosp Day # 0 PCP Guillermo Wetzel.  Kisha, Hypertension    • Menorrhagia 8/7/2019   • Migraines    • Sleep apnea     NOT USING MACHINE   • Visual impairment     WEARS GLASSES/CONTACT     Past Social History:   Past Surgical History:   Procedure Laterality Date   • OTHER  04/2018    Surgical IUD rem Extrinsic asthma, unspecified     Asthmatic bronchitis     Migraine without status migrainosus, not intractable     Pharyngitis     TMJ (dislocation of temporomandibular joint)     IUD threads lost     Ovarian cyst, complex     Uterine leiomyoma     Pelvic

## 2021-11-16 NOTE — TELEPHONE ENCOUNTER
Dr. Gordo Velazco,     Please sign off on form: Spouse paternity leave  -Highlight the patient and hit \"Chart\" button.   -In Chart Review, w/in the Encounter tab - click 1 time on the Telephone call encounter for 10/27/21 Scroll down the telephone encounter.  -Cl

## 2021-11-16 NOTE — TELEPHONE ENCOUNTER
New regimen:    Levemir 18 units qAM, 40 units qPM  Humalog 14 units with breakfast, 10 units with lunch, 32 units with dinner     Leah Núñez. Tomás Menchaca M.D.   Maternal-Fetal Medicine

## 2021-11-16 NOTE — TELEPHONE ENCOUNTER
[facility] is unavailable to assist. Asked [facility] as well and they are not available either. In house OB on Friday, 11/19 is Dr. Luis Jackson. Will send msg to Jose ESTRADA

## 2021-11-17 ENCOUNTER — LAB ENCOUNTER (OUTPATIENT)
Dept: LAB | Age: 41
End: 2021-11-17
Attending: OBSTETRICS & GYNECOLOGY
Payer: COMMERCIAL

## 2021-11-17 ENCOUNTER — TELEPHONE (OUTPATIENT)
Dept: OBGYN UNIT | Facility: HOSPITAL | Age: 41
End: 2021-11-17

## 2021-11-17 DIAGNOSIS — Z01.818 PREOP TESTING: ICD-10-CM

## 2021-11-17 NOTE — TELEPHONE ENCOUNTER
Pt left a voicemail stating that forms for spouse needs to be revised to state that he will be taking 12 weeks of leave and not 6 weeks. Completed pt's request, revised forms faxed to Nishant at [phone], confirmation received.  Spoke with pt and inf

## 2021-11-18 ENCOUNTER — LAB ENCOUNTER (OUTPATIENT)
Dept: LAB | Facility: HOSPITAL | Age: 41
End: 2021-11-18
Attending: OBSTETRICS & GYNECOLOGY
Payer: COMMERCIAL

## 2021-11-18 ENCOUNTER — ROUTINE PRENATAL (OUTPATIENT)
Dept: OBGYN CLINIC | Facility: CLINIC | Age: 41
End: 2021-11-18

## 2021-11-18 VITALS — WEIGHT: 218.19 LBS | BODY MASS INDEX: 40 KG/M2 | DIASTOLIC BLOOD PRESSURE: 78 MMHG | SYSTOLIC BLOOD PRESSURE: 116 MMHG

## 2021-11-18 DIAGNOSIS — Z01.818 PREOP TESTING: ICD-10-CM

## 2021-11-18 DIAGNOSIS — Z34.83 ENCOUNTER FOR SUPERVISION OF OTHER NORMAL PREGNANCY IN THIRD TRIMESTER: Primary | ICD-10-CM

## 2021-11-18 PROCEDURE — 81002 URINALYSIS NONAUTO W/O SCOPE: CPT | Performed by: OBSTETRICS & GYNECOLOGY

## 2021-11-18 PROCEDURE — 3078F DIAST BP <80 MM HG: CPT | Performed by: OBSTETRICS & GYNECOLOGY

## 2021-11-18 PROCEDURE — 36415 COLL VENOUS BLD VENIPUNCTURE: CPT

## 2021-11-18 PROCEDURE — 85025 COMPLETE CBC W/AUTO DIFF WBC: CPT

## 2021-11-18 PROCEDURE — 86850 RBC ANTIBODY SCREEN: CPT

## 2021-11-18 PROCEDURE — 3074F SYST BP LT 130 MM HG: CPT | Performed by: OBSTETRICS & GYNECOLOGY

## 2021-11-18 PROCEDURE — 86900 BLOOD TYPING SEROLOGIC ABO: CPT

## 2021-11-18 PROCEDURE — 86901 BLOOD TYPING SEROLOGIC RH(D): CPT

## 2021-11-18 RX ORDER — LAMOTRIGINE 25 MG/1
2 TABLET ORAL DAILY
COMMUNITY
Start: 2021-03-15 | End: 2021-12-14

## 2021-11-18 NOTE — PROGRESS NOTES
Tired. Is scheduled for primary  tomorrow. Needs to go for her preoperative blood work. Reports good fetal movements. Counseled on risks and benefits of surgery and elects to proceed. Will take half of her bedtime NPH insulin tonight.

## 2021-11-19 ENCOUNTER — HOSPITAL ENCOUNTER (INPATIENT)
Facility: HOSPITAL | Age: 41
LOS: 6 days | Discharge: HOME | End: 2021-11-25
Attending: OBSTETRICS & GYNECOLOGY | Admitting: OBSTETRICS & GYNECOLOGY
Payer: COMMERCIAL

## 2021-11-19 ENCOUNTER — ANESTHESIA (OUTPATIENT)
Dept: OBGYN UNIT | Facility: HOSPITAL | Age: 41
End: 2021-11-19
Payer: COMMERCIAL

## 2021-11-19 ENCOUNTER — ANESTHESIA EVENT (OUTPATIENT)
Dept: OBGYN UNIT | Facility: HOSPITAL | Age: 41
End: 2021-11-19
Payer: COMMERCIAL

## 2021-11-19 PROBLEM — O34.29 PREGNANCY W/ HX OF UTERINE MYOMECTOMY: Status: ACTIVE | Noted: 2021-11-19

## 2021-11-19 PROBLEM — O34.29 PREGNANCY W/ HX OF UTERINE MYOMECTOMY (HCC): Status: ACTIVE | Noted: 2021-11-19

## 2021-11-19 PROCEDURE — 59514 CESAREAN DELIVERY ONLY: CPT | Performed by: OBSTETRICS & GYNECOLOGY

## 2021-11-19 PROCEDURE — 59510 CESAREAN DELIVERY: CPT | Performed by: OBSTETRICS & GYNECOLOGY

## 2021-11-19 RX ORDER — GABAPENTIN 300 MG/1
300 CAPSULE ORAL EVERY 8 HOURS PRN
Status: DISCONTINUED | OUTPATIENT
Start: 2021-11-19 | End: 2021-11-25

## 2021-11-19 RX ORDER — METOCLOPRAMIDE HYDROCHLORIDE 5 MG/ML
10 INJECTION INTRAMUSCULAR; INTRAVENOUS ONCE
Status: COMPLETED | OUTPATIENT
Start: 2021-11-19 | End: 2021-11-19

## 2021-11-19 RX ORDER — MORPHINE SULFATE 1 MG/ML
INJECTION, SOLUTION EPIDURAL; INTRATHECAL; INTRAVENOUS
Status: COMPLETED | OUTPATIENT
Start: 2021-11-19 | End: 2021-11-19

## 2021-11-19 RX ORDER — HYDROCODONE BITARTRATE AND ACETAMINOPHEN 5; 325 MG/1; MG/1
2 TABLET ORAL AS NEEDED
Status: COMPLETED | OUTPATIENT
Start: 2021-11-19 | End: 2021-11-21

## 2021-11-19 RX ORDER — ACETAMINOPHEN 500 MG
1000 TABLET ORAL EVERY 6 HOURS
Status: DISCONTINUED | OUTPATIENT
Start: 2021-11-19 | End: 2021-11-21

## 2021-11-19 RX ORDER — AMMONIA INHALANTS 0.04 G/.3ML
0.3 INHALANT RESPIRATORY (INHALATION) AS NEEDED
Status: DISCONTINUED | OUTPATIENT
Start: 2021-11-19 | End: 2021-11-25

## 2021-11-19 RX ORDER — ENOXAPARIN SODIUM 100 MG/ML
40 INJECTION SUBCUTANEOUS EVERY 24 HOURS
Status: DISCONTINUED | OUTPATIENT
Start: 2021-11-19 | End: 2021-11-25

## 2021-11-19 RX ORDER — SODIUM CHLORIDE, SODIUM LACTATE, POTASSIUM CHLORIDE, CALCIUM CHLORIDE 600; 310; 30; 20 MG/100ML; MG/100ML; MG/100ML; MG/100ML
125 INJECTION, SOLUTION INTRAVENOUS CONTINUOUS
Status: DISCONTINUED | OUTPATIENT
Start: 2021-11-19 | End: 2021-11-19 | Stop reason: HOSPADM

## 2021-11-19 RX ORDER — HYDROMORPHONE HYDROCHLORIDE 1 MG/ML
0.6 INJECTION, SOLUTION INTRAMUSCULAR; INTRAVENOUS; SUBCUTANEOUS
Status: DISCONTINUED | OUTPATIENT
Start: 2021-11-19 | End: 2021-11-20

## 2021-11-19 RX ORDER — ALBUTEROL SULFATE 90 UG/1
2 AEROSOL, METERED RESPIRATORY (INHALATION) 2 TIMES DAILY PRN
Status: DISCONTINUED | OUTPATIENT
Start: 2021-11-19 | End: 2021-11-25

## 2021-11-19 RX ORDER — METOCLOPRAMIDE 10 MG/1
10 TABLET ORAL ONCE
Status: COMPLETED | OUTPATIENT
Start: 2021-11-19 | End: 2021-11-19

## 2021-11-19 RX ORDER — ONDANSETRON 2 MG/ML
INJECTION INTRAMUSCULAR; INTRAVENOUS AS NEEDED
Status: DISCONTINUED | OUTPATIENT
Start: 2021-11-19 | End: 2021-11-19 | Stop reason: SURG

## 2021-11-19 RX ORDER — FAMOTIDINE 20 MG/1
20 TABLET ORAL ONCE
Status: COMPLETED | OUTPATIENT
Start: 2021-11-19 | End: 2021-11-19

## 2021-11-19 RX ORDER — PROCHLORPERAZINE EDISYLATE 5 MG/ML
5 INJECTION INTRAMUSCULAR; INTRAVENOUS ONCE AS NEEDED
Status: ACTIVE | OUTPATIENT
Start: 2021-11-19 | End: 2021-11-19

## 2021-11-19 RX ORDER — NALBUPHINE HCL 10 MG/ML
2.5 AMPUL (ML) INJECTION EVERY 4 HOURS PRN
Status: DISCONTINUED | OUTPATIENT
Start: 2021-11-19 | End: 2021-11-20

## 2021-11-19 RX ORDER — MISOPROSTOL 200 UG/1
1000 TABLET ORAL ONCE AS NEEDED
Status: DISCONTINUED | OUTPATIENT
Start: 2021-11-19 | End: 2021-11-19 | Stop reason: HOSPADM

## 2021-11-19 RX ORDER — MELATONIN
325 2 TIMES DAILY WITH MEALS
Status: DISCONTINUED | OUTPATIENT
Start: 2021-11-19 | End: 2021-11-20

## 2021-11-19 RX ORDER — MONTELUKAST SODIUM 10 MG/1
10 TABLET ORAL NIGHTLY
Status: DISCONTINUED | OUTPATIENT
Start: 2021-11-19 | End: 2021-11-25

## 2021-11-19 RX ORDER — HALOPERIDOL 5 MG/ML
0.5 INJECTION INTRAMUSCULAR ONCE AS NEEDED
Status: ACTIVE | OUTPATIENT
Start: 2021-11-19 | End: 2021-11-19

## 2021-11-19 RX ORDER — HYDROMORPHONE HYDROCHLORIDE 1 MG/ML
0.4 INJECTION, SOLUTION INTRAMUSCULAR; INTRAVENOUS; SUBCUTANEOUS EVERY 5 MIN PRN
Status: DISCONTINUED | OUTPATIENT
Start: 2021-11-19 | End: 2021-11-20

## 2021-11-19 RX ORDER — MORPHINE SULFATE 4 MG/ML
4 INJECTION, SOLUTION INTRAMUSCULAR; INTRAVENOUS EVERY 10 MIN PRN
Status: DISCONTINUED | OUTPATIENT
Start: 2021-11-19 | End: 2021-11-20

## 2021-11-19 RX ORDER — METOPROLOL TARTRATE 5 MG/5ML
2.5 INJECTION INTRAVENOUS ONCE
Status: DISCONTINUED | OUTPATIENT
Start: 2021-11-19 | End: 2021-11-25

## 2021-11-19 RX ORDER — FLUOXETINE HYDROCHLORIDE 20 MG/1
20 CAPSULE ORAL DAILY
Status: DISCONTINUED | OUTPATIENT
Start: 2021-11-19 | End: 2021-11-25

## 2021-11-19 RX ORDER — HYDROCODONE BITARTRATE AND ACETAMINOPHEN 7.5; 325 MG/1; MG/1
2 TABLET ORAL EVERY 6 HOURS PRN
Status: DISCONTINUED | OUTPATIENT
Start: 2021-11-19 | End: 2021-11-20

## 2021-11-19 RX ORDER — NALOXONE HYDROCHLORIDE 0.4 MG/ML
0.08 INJECTION, SOLUTION INTRAMUSCULAR; INTRAVENOUS; SUBCUTANEOUS
Status: DISCONTINUED | OUTPATIENT
Start: 2021-11-19 | End: 2021-11-20

## 2021-11-19 RX ORDER — TRANEXAMIC ACID 10 MG/ML
INJECTION, SOLUTION INTRAVENOUS
Status: DISCONTINUED
Start: 2021-11-19 | End: 2021-11-19 | Stop reason: WASHOUT

## 2021-11-19 RX ORDER — HYDROCODONE BITARTRATE AND ACETAMINOPHEN 7.5; 325 MG/1; MG/1
1 TABLET ORAL EVERY 6 HOURS PRN
Status: DISCONTINUED | OUTPATIENT
Start: 2021-11-19 | End: 2021-11-20

## 2021-11-19 RX ORDER — MIDAZOLAM HYDROCHLORIDE 1 MG/ML
INJECTION INTRAMUSCULAR; INTRAVENOUS AS NEEDED
Status: DISCONTINUED | OUTPATIENT
Start: 2021-11-19 | End: 2021-11-19 | Stop reason: SURG

## 2021-11-19 RX ORDER — ONDANSETRON 2 MG/ML
4 INJECTION INTRAMUSCULAR; INTRAVENOUS EVERY 6 HOURS PRN
Status: DISCONTINUED | OUTPATIENT
Start: 2021-11-19 | End: 2021-11-25

## 2021-11-19 RX ORDER — HYDROMORPHONE HYDROCHLORIDE 1 MG/ML
0.2 INJECTION, SOLUTION INTRAMUSCULAR; INTRAVENOUS; SUBCUTANEOUS EVERY 5 MIN PRN
Status: DISCONTINUED | OUTPATIENT
Start: 2021-11-19 | End: 2021-11-20

## 2021-11-19 RX ORDER — MISOPROSTOL 200 UG/1
TABLET ORAL
Status: DISPENSED
Start: 2021-11-19 | End: 2021-11-19

## 2021-11-19 RX ORDER — DOCUSATE SODIUM 100 MG/1
100 CAPSULE, LIQUID FILLED ORAL
Status: DISCONTINUED | OUTPATIENT
Start: 2021-11-19 | End: 2021-11-25

## 2021-11-19 RX ORDER — LIDOCAINE HYDROCHLORIDE 10 MG/ML
INJECTION, SOLUTION INFILTRATION; PERINEURAL
Status: COMPLETED | OUTPATIENT
Start: 2021-11-19 | End: 2021-11-19

## 2021-11-19 RX ORDER — ONDANSETRON 2 MG/ML
4 INJECTION INTRAMUSCULAR; INTRAVENOUS EVERY 6 HOURS PRN
Status: DISCONTINUED | OUTPATIENT
Start: 2021-11-19 | End: 2021-11-19 | Stop reason: HOSPADM

## 2021-11-19 RX ORDER — HALOPERIDOL 5 MG/ML
0.25 INJECTION INTRAMUSCULAR ONCE AS NEEDED
Status: ACTIVE | OUTPATIENT
Start: 2021-11-19 | End: 2021-11-19

## 2021-11-19 RX ORDER — FAMOTIDINE 10 MG/ML
20 INJECTION, SOLUTION INTRAVENOUS ONCE
Status: COMPLETED | OUTPATIENT
Start: 2021-11-19 | End: 2021-11-19

## 2021-11-19 RX ORDER — KETOROLAC TROMETHAMINE 30 MG/ML
INJECTION, SOLUTION INTRAMUSCULAR; INTRAVENOUS AS NEEDED
Status: DISCONTINUED | OUTPATIENT
Start: 2021-11-19 | End: 2021-11-19 | Stop reason: SURG

## 2021-11-19 RX ORDER — HYDROMORPHONE HYDROCHLORIDE 1 MG/ML
0.6 INJECTION, SOLUTION INTRAMUSCULAR; INTRAVENOUS; SUBCUTANEOUS EVERY 5 MIN PRN
Status: DISCONTINUED | OUTPATIENT
Start: 2021-11-19 | End: 2021-11-20

## 2021-11-19 RX ORDER — DIPHENHYDRAMINE HCL 25 MG
25 CAPSULE ORAL EVERY 4 HOURS PRN
Status: DISCONTINUED | OUTPATIENT
Start: 2021-11-19 | End: 2021-11-20

## 2021-11-19 RX ORDER — METOPROLOL SUCCINATE 50 MG/1
50 TABLET, EXTENDED RELEASE ORAL DAILY
Status: DISCONTINUED | OUTPATIENT
Start: 2021-11-19 | End: 2021-11-19

## 2021-11-19 RX ORDER — ONDANSETRON 2 MG/ML
4 INJECTION INTRAMUSCULAR; INTRAVENOUS ONCE AS NEEDED
Status: ACTIVE | OUTPATIENT
Start: 2021-11-19 | End: 2021-11-19

## 2021-11-19 RX ORDER — METOPROLOL SUCCINATE 50 MG/1
50 TABLET, EXTENDED RELEASE ORAL DAILY
Status: DISCONTINUED | OUTPATIENT
Start: 2021-11-19 | End: 2021-11-25

## 2021-11-19 RX ORDER — ACETAMINOPHEN 500 MG
1000 TABLET ORAL ONCE
Status: COMPLETED | OUTPATIENT
Start: 2021-11-19 | End: 2021-11-19

## 2021-11-19 RX ORDER — DEXTROSE, SODIUM CHLORIDE, SODIUM LACTATE, POTASSIUM CHLORIDE, AND CALCIUM CHLORIDE 5; .6; .31; .03; .02 G/100ML; G/100ML; G/100ML; G/100ML; G/100ML
INJECTION, SOLUTION INTRAVENOUS CONTINUOUS
Status: DISCONTINUED | OUTPATIENT
Start: 2021-11-19 | End: 2021-11-25

## 2021-11-19 RX ORDER — ACETAMINOPHEN 325 MG/1
650 TABLET ORAL EVERY 6 HOURS PRN
Status: DISCONTINUED | OUTPATIENT
Start: 2021-11-19 | End: 2021-11-20

## 2021-11-19 RX ORDER — DEXTROSE MONOHYDRATE 25 G/50ML
50 INJECTION, SOLUTION INTRAVENOUS
Status: DISCONTINUED | OUTPATIENT
Start: 2021-11-19 | End: 2021-11-25

## 2021-11-19 RX ORDER — KETOROLAC TROMETHAMINE 30 MG/ML
30 INJECTION, SOLUTION INTRAMUSCULAR; INTRAVENOUS EVERY 6 HOURS
Status: DISPENSED | OUTPATIENT
Start: 2021-11-19 | End: 2021-11-20

## 2021-11-19 RX ORDER — TRISODIUM CITRATE DIHYDRATE AND CITRIC ACID MONOHYDRATE 500; 334 MG/5ML; MG/5ML
30 SOLUTION ORAL ONCE
Status: COMPLETED | OUTPATIENT
Start: 2021-11-19 | End: 2021-11-19

## 2021-11-19 RX ORDER — NALOXONE HYDROCHLORIDE 0.4 MG/ML
80 INJECTION, SOLUTION INTRAMUSCULAR; INTRAVENOUS; SUBCUTANEOUS AS NEEDED
Status: ACTIVE | OUTPATIENT
Start: 2021-11-19 | End: 2021-11-19

## 2021-11-19 RX ORDER — MORPHINE SULFATE 10 MG/ML
6 INJECTION, SOLUTION INTRAMUSCULAR; INTRAVENOUS EVERY 10 MIN PRN
Status: DISCONTINUED | OUTPATIENT
Start: 2021-11-19 | End: 2021-11-20

## 2021-11-19 RX ORDER — EPHEDRINE SULFATE 50 MG/ML
INJECTION INTRAVENOUS AS NEEDED
Status: DISCONTINUED | OUTPATIENT
Start: 2021-11-19 | End: 2021-11-19 | Stop reason: SURG

## 2021-11-19 RX ORDER — FLUTICASONE PROPIONATE 50 MCG
2 SPRAY, SUSPENSION (ML) NASAL DAILY
Status: DISCONTINUED | OUTPATIENT
Start: 2021-11-19 | End: 2021-11-19

## 2021-11-19 RX ORDER — SODIUM CHLORIDE, SODIUM LACTATE, POTASSIUM CHLORIDE, CALCIUM CHLORIDE 600; 310; 30; 20 MG/100ML; MG/100ML; MG/100ML; MG/100ML
INJECTION, SOLUTION INTRAVENOUS CONTINUOUS
Status: DISCONTINUED | OUTPATIENT
Start: 2021-11-19 | End: 2021-11-25

## 2021-11-19 RX ORDER — IBUPROFEN 600 MG/1
600 TABLET ORAL EVERY 6 HOURS
Status: DISCONTINUED | OUTPATIENT
Start: 2021-11-20 | End: 2021-11-23

## 2021-11-19 RX ORDER — BISACODYL 10 MG
10 SUPPOSITORY, RECTAL RECTAL ONCE AS NEEDED
Status: DISCONTINUED | OUTPATIENT
Start: 2021-11-19 | End: 2021-11-25

## 2021-11-19 RX ORDER — MORPHINE SULFATE 2 MG/ML
2 INJECTION, SOLUTION INTRAMUSCULAR; INTRAVENOUS EVERY 10 MIN PRN
Status: DISCONTINUED | OUTPATIENT
Start: 2021-11-19 | End: 2021-11-20

## 2021-11-19 RX ORDER — TRANEXAMIC ACID 10 MG/ML
1000 INJECTION, SOLUTION INTRAVENOUS ONCE AS NEEDED
Status: DISCONTINUED | OUTPATIENT
Start: 2021-11-19 | End: 2021-11-19 | Stop reason: HOSPADM

## 2021-11-19 RX ORDER — BUPIVACAINE HYDROCHLORIDE 7.5 MG/ML
INJECTION, SOLUTION INTRASPINAL
Status: COMPLETED | OUTPATIENT
Start: 2021-11-19 | End: 2021-11-19

## 2021-11-19 RX ORDER — FLUTICASONE PROPIONATE 50 MCG
2 SPRAY, SUSPENSION (ML) NASAL
Status: DISCONTINUED | OUTPATIENT
Start: 2021-11-19 | End: 2021-11-25

## 2021-11-19 RX ORDER — SIMETHICONE 80 MG
80 TABLET,CHEWABLE ORAL 3 TIMES DAILY PRN
Status: DISCONTINUED | OUTPATIENT
Start: 2021-11-19 | End: 2021-11-25

## 2021-11-19 RX ORDER — FLUOXETINE HYDROCHLORIDE 20 MG/1
20 CAPSULE ORAL EVERY 6 HOURS SCHEDULED
Status: DISCONTINUED | OUTPATIENT
Start: 2021-11-19 | End: 2021-11-19

## 2021-11-19 RX ORDER — HYDROCODONE BITARTRATE AND ACETAMINOPHEN 5; 325 MG/1; MG/1
1 TABLET ORAL AS NEEDED
Status: COMPLETED | OUTPATIENT
Start: 2021-11-19 | End: 2021-11-21

## 2021-11-19 RX ORDER — SODIUM CHLORIDE 9 MG/ML
INJECTION, SOLUTION INTRAVENOUS CONTINUOUS PRN
Status: DISCONTINUED | OUTPATIENT
Start: 2021-11-19 | End: 2021-11-19 | Stop reason: SURG

## 2021-11-19 RX ORDER — DIPHENHYDRAMINE HYDROCHLORIDE 50 MG/ML
12.5 INJECTION INTRAMUSCULAR; INTRAVENOUS EVERY 4 HOURS PRN
Status: DISCONTINUED | OUTPATIENT
Start: 2021-11-19 | End: 2021-11-20

## 2021-11-19 RX ORDER — CEFAZOLIN SODIUM/WATER 2 G/20 ML
2 SYRINGE (ML) INTRAVENOUS ONCE
Status: COMPLETED | OUTPATIENT
Start: 2021-11-19 | End: 2021-11-19

## 2021-11-19 RX ORDER — HYDROMORPHONE HYDROCHLORIDE 1 MG/ML
0.4 INJECTION, SOLUTION INTRAMUSCULAR; INTRAVENOUS; SUBCUTANEOUS
Status: DISCONTINUED | OUTPATIENT
Start: 2021-11-19 | End: 2021-11-20

## 2021-11-19 RX ADMIN — EPHEDRINE SULFATE 10 MG: 50 INJECTION INTRAVENOUS at 09:54:00

## 2021-11-19 RX ADMIN — SODIUM CHLORIDE: 9 INJECTION, SOLUTION INTRAVENOUS at 12:08:00

## 2021-11-19 RX ADMIN — ONDANSETRON 4 MG: 2 INJECTION INTRAMUSCULAR; INTRAVENOUS at 10:32:00

## 2021-11-19 RX ADMIN — LIDOCAINE HYDROCHLORIDE 5 ML: 10 INJECTION, SOLUTION INFILTRATION; PERINEURAL at 09:51:00

## 2021-11-19 RX ADMIN — MORPHINE SULFATE 0.3 MG: 1 INJECTION, SOLUTION EPIDURAL; INTRATHECAL; INTRAVENOUS at 09:51:00

## 2021-11-19 RX ADMIN — EPHEDRINE SULFATE 5 MG: 50 INJECTION INTRAVENOUS at 10:20:00

## 2021-11-19 RX ADMIN — CEFAZOLIN SODIUM/WATER 1 G: 2 G/20 ML SYRINGE (ML) INTRAVENOUS at 10:39:00

## 2021-11-19 RX ADMIN — SODIUM CHLORIDE: 9 INJECTION, SOLUTION INTRAVENOUS at 12:07:00

## 2021-11-19 RX ADMIN — MIDAZOLAM HYDROCHLORIDE 2 MG: 1 INJECTION INTRAMUSCULAR; INTRAVENOUS at 10:59:00

## 2021-11-19 RX ADMIN — BUPIVACAINE HYDROCHLORIDE 1.5 ML: 7.5 INJECTION, SOLUTION INTRASPINAL at 09:51:00

## 2021-11-19 RX ADMIN — SODIUM CHLORIDE: 9 INJECTION, SOLUTION INTRAVENOUS at 09:36:00

## 2021-11-19 RX ADMIN — KETOROLAC TROMETHAMINE 30 MG: 30 INJECTION, SOLUTION INTRAMUSCULAR; INTRAVENOUS at 11:50:00

## 2021-11-19 RX ADMIN — SODIUM CHLORIDE: 9 INJECTION, SOLUTION INTRAVENOUS at 12:06:00

## 2021-11-19 RX ADMIN — CEFAZOLIN SODIUM/WATER 2 G: 2 G/20 ML SYRINGE (ML) INTRAVENOUS at 09:39:00

## 2021-11-19 NOTE — PLAN OF CARE
Problem: BIRTH - VAGINAL/ SECTION  Goal: Fetal and maternal status remain reassuring during the birth process  Description: INTERVENTIONS:  - Monitor vital signs  - Monitor fetal heart rate  - Monitor uterine activity  - Monitor labor progression choices  Outcome: Completed     Problem: Patient/Family Goals  Goal: Patient/Family Long Term Goal  Description: Patient's Long Term Goal: Uncomplicated Delivery     Interventions:  - Assessment/Monitoring  - Induction/Augmentation per protocol and Provide

## 2021-11-19 NOTE — ANESTHESIA POSTPROCEDURE EVALUATION
Patient:  Dennis    Procedure Summary     Date: 21 Room / Location: [address] [place] [address] L+D OR  [facility] L+D OR    Anesthesia Start: 7686 Anesthesia Stop:     Procedure:  SECTION (N/A ) Diagnosis: (Primary  section hx myomectomy)    Surgeons:

## 2021-11-19 NOTE — PROGRESS NOTES
Pt is a 46year old female admitted to TR5/TR5-A. Scheduled for Primary  Section. Pt is  37w0d intra-uterine pregnancy. History obtained, consents signed. Oriented to room, staff, and plan of care.

## 2021-11-19 NOTE — ANESTHESIA PREPROCEDURE EVALUATION
Anesthesia PreOp Note    HPI:      Donis Nair is a 46year old female who presents for preoperative consultation requested by: Larisa Moran MD    Date of Surgery: 2021    Procedure(s):   SECTION  Indication: Primary  section Anxiety state    • Asthma    • Bell's palsy 2012   • Decorative tattoo    • Depression     CURRENTLY ON MEDS   • Diabetes ([facility]    • [facility]     TYPE 2   • Essential hypertension    • Extrinsic asthma, unspecified     Per NG: Drug therapy LANCETS Does not apply Misc, check sugar fasting MWF and at bedtime TuTaidee Phillips, Disp: 100 each, Rfl: 1, 11/18/2021 at Unknown time  Glucose Blood (ONETOUCH VERIO) In Vitro Strip, check sugar fasting MWF and at bedtime Koko Phillips, Disp: 200 strip, Rfl: Saúl Chowdhury MD   Or  metoclopramide [facility]) injection 10 mg, 10 mg, Intravenous, Once, Saúl Chowdhury MD  ceFAZolin sodium (ANCEF/KEFZOL) 2 GM/20ML premix IV syringe 2 g, 2 g, Intravenous, Once, Saúl Chowdhury MD  miSOPROStol (CYTOTEC) 200 Back Care: Not Asked        Exercise: Not Asked        Bike Helmet: Not Asked        Seat Belt: Not Asked        Self-Exams: Not Asked    Social History Narrative      Not on file    Social Determinants of Health  Financial Resource Strain: Not on file Consent Comment: GA if SAB fails  Discussed plan with:  Surgeon      I have informed Arlette Collins and/or legal guardian or family member of the nature of the anesthetic plan, benefits, risks including possible dental damage if relevant, major complica

## 2021-11-19 NOTE — L&D DELIVERY NOTE
Ana Stevenson [[id number]]    Labor Events     labor?: No   steroids?: None  Antibiotics received during labor?: No  Antibiotics (enter # doses in comment): none  Rupture date/time: 2021 1027     Rupture type: AROM  Fluid color: Clear Reflex irritability No response Grimace Cry or active withdrawal    Muscle tone Limp Some flexion Active motion    Respiratory effort Absent Weak cry; hypoventilation Good, crying              1 Minute:  5 Minute:  10 Minute:  15 Minute:  20 Minute:    S 10 minutes:      Placenta  Date/Time of Delivery: 11/19/2021 10:30 AM   Delivery: spontaneous  Placenta to Pathology: yes  Cord Gases Submitted: no  Cord Blood/Tissue Collection: yes  Cord Complications: none  Sponge and Needle Counts:  Verified  Deltia yes

## 2021-11-19 NOTE — ANESTHESIA PROCEDURE NOTES
Spinal Block    Date/Time: 11/19/2021 9:51 AM  Performed by: Lennox Estrada MD  Authorized by: Lennox Estrada MD       General Information and Staff    Start Time:  11/19/2021 9:41 AM  End Time:  11/19/2021 9:51 AM  Anesthesiologist:  Alban Batres

## 2021-11-19 NOTE — PROGRESS NOTES
Patient transferred to mother/baby room 364 per cart in stable condition with baby and personal belongings. Accompanied by  and staff. Report given to mother/baby RN.

## 2021-11-20 RX ORDER — MELATONIN
325
Status: DISCONTINUED | OUTPATIENT
Start: 2021-11-20 | End: 2021-11-25

## 2021-11-20 NOTE — PROGRESS NOTES
[facility]  Anesthesiology Epidural Follow-up Note  2021    Patient name: April KHADIJAH Collins 46year old female  : 10/24/1980  MRN: [id number]    Diagnosis: [unfilled]    S/P:  csection    Pain treatment modality: Duramorph    Current hos

## 2021-11-20 NOTE — LACTATION NOTE
LACTATION NOTE - MOTHER      Evaluation Type: Inpatient    Problems identified  Problems identified: Knowledge deficit    Maternal history  Maternal history: Gestational diabetes  Other/comment: HTN    Breastfeeding goal  Breastfeeding goal: To maintain br

## 2021-11-20 NOTE — PROGRESS NOTES
[facility]    OB/GYNE Progress Note       Dennis Patient Status:  Inpatient    10/24/1980 MRN [id number]   Location [facility] 3SE Attending Ailyn Melton MD   Hosp Day # 1 PCP Anthony Sesay.  DO Kisha       Subjective Pregnancy w/ hx of uterine myomectomy     PO 1  S/p classical  section  BP stable. BS stable. Sliding scale insulin. Check AC blood sugars. Up in chair > ambulate  SCDs. Lovenox   Gen DM diet as roshan. Abdominal binder.       RAYMOND Shrestha

## 2021-11-20 NOTE — OPERATIVE REPORT
[facility]    PATIENT'S NAME: Deedee Boxer   ATTENDING PHYSICIAN: Frederick Solis MD   OPERATING PHYSICIAN: Frederick Solis MD   PATIENT ACCOUNT#:   [de-identified]    LOCATION:  [address] [place] [address] #:   [id number]       DATE OF BIRTH: lateral tilt, and the fetal heart tones were reassuring. Sequential compression stockings were placed and activated, and a Mcconnell catheter was placed to gravity.   The abdomen was prepped and draped in a standard manner, and after adequate level of spinal a delay, the umbilical cord was doubly clamped and cut, and the infant was taken to awaiting  staff. The baby had cried upon delivery. The placenta was delivered spontaneously and intact.   The uterus could not be delivered through the incision due urine.  The patient was transferred to recovery room in stable condition. This  section required 100% additional time addressing uterine adhesions and repair of anatomically distorted uterus from uterine fibroids and prior uterine myomectomy.   Fawad Ortiz

## 2021-11-21 PROCEDURE — 30233R1 TRANSFUSION OF NONAUTOLOGOUS PLATELETS INTO PERIPHERAL VEIN, PERCUTANEOUS APPROACH: ICD-10-PCS | Performed by: OBSTETRICS & GYNECOLOGY

## 2021-11-21 RX ORDER — SODIUM CHLORIDE 9 MG/ML
INJECTION, SOLUTION INTRAVENOUS ONCE
Status: COMPLETED | OUTPATIENT
Start: 2021-11-21 | End: 2021-11-21

## 2021-11-21 RX ORDER — ACETAMINOPHEN 500 MG
500 TABLET ORAL EVERY 6 HOURS
Status: DISCONTINUED | OUTPATIENT
Start: 2021-11-21 | End: 2021-11-25

## 2021-11-21 RX ORDER — HYDROCODONE BITARTRATE AND ACETAMINOPHEN 5; 325 MG/1; MG/1
1 TABLET ORAL EVERY 6 HOURS PRN
Status: DISCONTINUED | OUTPATIENT
Start: 2021-11-21 | End: 2021-11-25

## 2021-11-21 NOTE — PROGRESS NOTES
[place] [facility]    OB/GYNE Progress Note       Dennis Patient Status:  Inpatient    10/24/1980 MRN [id number]   Location [facility] 3SE Attending Nela Mckinley MD   [facility] Day # 2 PCP Bonnie Valente.  Kisha, DO     Assessment & Pl 11/21/2021    CREATSERUM 0.57 07/02/2021    BUN 7 07/02/2021     07/02/2021    K 3.7 07/02/2021     07/02/2021    CO2 26.0 07/02/2021    GLU 92 07/02/2021    CA 9.6 07/02/2021    ALB 3.3 (L) 05/15/2021    ALKPHO 46 05/15/2021    BILT 0.5 05/15/

## 2021-11-22 NOTE — LACTATION NOTE
LACTATION NOTE - MOTHER      Evaluation Type: Inpatient    Problems identified  Problems identified: Knowledge deficit    Maternal history  Maternal history: Gestational diabetes;Caesarean section;PPH  Other/comment: HTN    Breastfeeding goal  Breastfeedin

## 2021-11-22 NOTE — PROGRESS NOTES
[place] [facility]    OB/GYNE Progress Note       Dennis Patient Status:  Inpatient    10/24/1980 MRN [id number]   Location [facility] 3SE Attending Brant Coleman MD   [facility] Day # 3 PCP Marisa Guevara.  Kisha,      Assessment & Pl .0 11/21/2021    CREATSERUM 0.57 07/02/2021    BUN 7 07/02/2021     07/02/2021    K 3.7 07/02/2021     07/02/2021    CO2 26.0 07/02/2021    GLU 92 07/02/2021    CA 9.6 07/02/2021    ALB 3.3 (L) 05/15/2021    ALKPHO 46 05/15/2021    BI

## 2021-11-23 ENCOUNTER — APPOINTMENT (OUTPATIENT)
Dept: GENERAL RADIOLOGY | Facility: HOSPITAL | Age: 41
End: 2021-11-23
Attending: OBSTETRICS & GYNECOLOGY
Payer: COMMERCIAL

## 2021-11-23 ENCOUNTER — APPOINTMENT (OUTPATIENT)
Dept: CT IMAGING | Facility: HOSPITAL | Age: 41
End: 2021-11-23
Attending: HOSPITALIST
Payer: COMMERCIAL

## 2021-11-23 ENCOUNTER — APPOINTMENT (OUTPATIENT)
Dept: CV DIAGNOSTICS | Facility: HOSPITAL | Age: 41
End: 2021-11-23
Attending: HOSPITALIST
Payer: COMMERCIAL

## 2021-11-23 PROBLEM — O32.2XX0 TRANSVERSE LIE OF FETUS (HCC): Status: ACTIVE | Noted: 2021-11-23

## 2021-11-23 PROBLEM — O32.2XX0 TRANSVERSE LIE OF FETUS: Status: ACTIVE | Noted: 2021-11-23

## 2021-11-23 PROCEDURE — 71260 CT THORAX DX C+: CPT | Performed by: HOSPITALIST

## 2021-11-23 PROCEDURE — 93306 TTE W/DOPPLER COMPLETE: CPT | Performed by: HOSPITALIST

## 2021-11-23 PROCEDURE — 71045 X-RAY EXAM CHEST 1 VIEW: CPT | Performed by: OBSTETRICS & GYNECOLOGY

## 2021-11-23 PROCEDURE — 99255 IP/OBS CONSLTJ NEW/EST HI 80: CPT | Performed by: HOSPITALIST

## 2021-11-23 RX ORDER — ALBUTEROL SULFATE 90 UG/1
2 AEROSOL, METERED RESPIRATORY (INHALATION) 4 TIMES DAILY
Status: DISCONTINUED | OUTPATIENT
Start: 2021-11-23 | End: 2021-11-25

## 2021-11-23 RX ORDER — CALCIUM GLUCONATE 94 MG/ML
1 INJECTION, SOLUTION INTRAVENOUS ONCE AS NEEDED
Status: DISCONTINUED | OUTPATIENT
Start: 2021-11-23 | End: 2021-11-25

## 2021-11-23 RX ORDER — FUROSEMIDE 10 MG/ML
40 INJECTION INTRAMUSCULAR; INTRAVENOUS
Status: DISCONTINUED | OUTPATIENT
Start: 2021-11-23 | End: 2021-11-25

## 2021-11-23 RX ORDER — ALBUTEROL SULFATE 2.5 MG/3ML
2.5 SOLUTION RESPIRATORY (INHALATION) EVERY 6 HOURS PRN
Status: DISCONTINUED | OUTPATIENT
Start: 2021-11-23 | End: 2021-11-23

## 2021-11-23 RX ORDER — ALBUTEROL SULFATE 2.5 MG/3ML
2.5 SOLUTION RESPIRATORY (INHALATION) EVERY 4 HOURS PRN
Status: DISCONTINUED | OUTPATIENT
Start: 2021-11-23 | End: 2021-11-25

## 2021-11-23 RX ORDER — FUROSEMIDE 10 MG/ML
INJECTION INTRAMUSCULAR; INTRAVENOUS
Status: COMPLETED
Start: 2021-11-23 | End: 2021-11-24

## 2021-11-23 RX ORDER — PSEUDOEPHEDRINE HCL 30 MG
100 TABLET ORAL 2 TIMES DAILY
Qty: 40 CAPSULE | Refills: 0 | Status: SHIPPED | OUTPATIENT
Start: 2021-11-23

## 2021-11-23 RX ORDER — HYDROCODONE BITARTRATE AND ACETAMINOPHEN 5; 325 MG/1; MG/1
1 TABLET ORAL EVERY 6 HOURS PRN
Qty: 15 TABLET | Refills: 0 | Status: SHIPPED | OUTPATIENT
Start: 2021-11-23 | End: 2021-12-14

## 2021-11-23 RX ORDER — IBUPROFEN 600 MG/1
600 TABLET ORAL EVERY 6 HOURS
Qty: 12 TABLET | Refills: 0 | Status: SHIPPED | OUTPATIENT
Start: 2021-11-23 | End: 2021-12-13

## 2021-11-23 RX ORDER — GABAPENTIN 300 MG/1
300 CAPSULE ORAL EVERY 8 HOURS PRN
Qty: 20 CAPSULE | Refills: 0 | Status: SHIPPED | OUTPATIENT
Start: 2021-11-23

## 2021-11-23 NOTE — PLAN OF CARE
Vitals and assessment WNL. Pain controlled with Motrin/Norco/ES Tylenol 500 mg; good relief from abdominal binder as well per patient. Showered today; vertical incision clean, dry and intact with staples c/telfa. Linens changed.   Breastfeeding infant an equipment as indicated. Ensure aseptic care of all intravenous lines and invasive tubes/drains.  - Obtain immunization and exposure to communicable diseases history.   Outcome: Progressing  Goal: Optimize infant feeding at the breast  Description: INTERVENT assistance until it is safe to breastfeed infant. Outcome: Progressing  Goal: Appropriate maternal -  bonding  Description: INTERVENTIONS:  - Assess caregiver- interactions. - Assess caregiver's emotional status and coping mechanisms.   - En

## 2021-11-23 NOTE — CONSULTS
[facility]    PATIENT'S NAME: Prince Payor   ATTENDING PHYSICIAN: Kenna Webster MD   CONSULTING PHYSICIAN: Tricia William MD   PATIENT ACCOUNT#:   [id number]    LOCATION:  [address] [place] [address] #:   [id number]       DATE OF BIRTH: Temperature 98.0, pulse 91, respiratory rate 20, blood pressure 158/89, pulse ox 85% on room air, 96% on nasal cannula oxygen. HEENT:  Atraumatic. Oropharynx clear. Dry mucous membranes. Normal hard and soft palate. Eyes:  Anicteric sclerae.   NECK:  S

## 2021-11-23 NOTE — PROGRESS NOTES
Dr. James James notified of elevated liver enzymes. Order received to start magnesium sulfate therapy. Mcconnell catheter placed, 4g bolus given by Anamaria Carrasquillo RN. Report given to [facility], SAKSHI.

## 2021-11-23 NOTE — DISCHARGE SUMMARY
[place] [facility] - [facility]    OB/GYNE Discharge Note       Dennis Patient Status:  Inpatient    10/24/1980 MRN [id number]   Location [facility] 3SE Attending Traci Garcia MD   [facility] Day # 4 PCP Mike Farris.  DO Kisha     Admit date:  1 4  S/p primary cs, classical  BS, DM stable. No insulin  Pp anemia stable after one U PRBC  Home.   PO pain meds, Norco, Ibuprofen, Tylenol, Colace, Gabapentin  FU office Fri for staple removal RN wound check  FU 2 and 6 weeks        Honey Sibley MD

## 2021-11-23 NOTE — PROGRESS NOTES
Dr Nas Dixon paged X 3, no answer. Called  to assist. Transferred to Nas Dixon who told me he was busy with another PT and will see this pt as soon as possible.

## 2021-11-23 NOTE — CONSULTS
[facility] NOTE      Patient Name: Lennox Li MRN: [id number]   : 10/24/1980 CSN: 25 • Extrinsic asthma, unspecified     Per NG: Drug therapy   • Hypertension    • Menorrhagia 8/7/2019   • Migraines    • Sleep apnea     NOT USING MACHINE   • Visual impairment     WEARS GLASSES/CONTACT     Past Surgical History:   Procedure Laterality Lam murmur, gallop, or rub  Abd - soft, nontender  Ext - no edema, pulses full  Neuro - A+Ox3, no facial droop or gross deficits  Psych - cooperative    LABS AND DATA:     Lab Results   Component Value Date    WBC 8.3 11/23/2021    HGB 8.8 (L) 11/23/2021    HC [place]  11/23/2021    c5

## 2021-11-24 PROCEDURE — 99233 SBSQ HOSP IP/OBS HIGH 50: CPT | Performed by: HOSPITALIST

## 2021-11-24 NOTE — PROGRESS NOTES
Progress note when pt seen at approx 130 pm.      Pt seen in room approx 130 pm due to c/o shortness of breath. Pt stated this came on within the last 20 min approx.   Pt was not c/o chest pain and was conversational.  Pt stated she has  A history of asthm

## 2021-11-24 NOTE — SPIRITUAL CARE NOTE
responded to RRT. Upon arrival  observed medical team attending to Pt.  consulted with RN. RN reported that Pt was having chest pain. This is the 2nd RRT called. Pt is a 46year old female, admitted   in L & D 11/19/21.    Pt is

## 2021-11-24 NOTE — PROGRESS NOTES
[facility]    Progress Note    April KHADIJAH Dennis Patient Status:  Inpatient    10/24/1980 MRN [id number]   Location [facility] 3SE Attending Dhruv Lim MD   [facility] Day # 5 PCP Bonnie Valente.  DO Kisha       Subjective:   Arlette Mo (IV ONLY) (CPT=71260)    Result Date: 11/23/2021  CONCLUSION:  1. No evidence of acute pulmonary embolism to the level of the proximal segment pulmonary artery branches.   2. Diffuse bilateral pulmonary interstitial edema with associated multifocal ground-g ammonia aromatic, ondansetron, gabapentin, witch hazel-glycerin, phenylephrine-min oil-bobby, simethicone, magnesium hydroxide, bisacodyl, fluticasone propionate      Assessment and Plan:       Pregnancy with history of uterine myomectomy  Status post C se

## 2021-11-24 NOTE — PROGRESS NOTES
Patient called for left sided chest pain under left breast. Rapid called on patient. Vital signs stable, EkG performed and normal. Dr. Helene Aggarwal attended rapid response as well as Dr. Sabiha Corbett. Dr. Alex Vincent notified and will be in to assess patient soon.

## 2021-11-24 NOTE — PROGRESS NOTES
POD 5    Pt stated she is feeling much better, sob much improved, pt feeding her baby in bed.      Alert and oriented, nad  Chest  Very scant crackles at lung bases, good air movement, no pain with breathing  abd soft, midline vertical skin incision is c/d/

## 2021-11-24 NOTE — PROGRESS NOTES
[facility]     Cardiology Progress Note         Dennis Patient Status:  Inpatient    10/24/1980 MRN [id number]   Location [facility] 3SE Attending Max Carlson MD   [facility] Day # 5 PCP Melissa Cason.  DO Kisha       Sub Tartrate  2.5 mg Intravenous Once   • montelukast  10 mg Oral Nightly   • enoxaparin  40 mg Subcutaneous Q24H   • docusate sodium  100 mg Oral [email or web address]   • Insulin Aspart Pen  1-7 Units Subcutaneous TID CC   • FLUoxetine  20 mg Oral Daily   • metoprol

## 2021-11-25 VITALS
HEART RATE: 74 BPM | TEMPERATURE: 99 F | RESPIRATION RATE: 18 BRPM | DIASTOLIC BLOOD PRESSURE: 75 MMHG | SYSTOLIC BLOOD PRESSURE: 133 MMHG | HEIGHT: 62 IN | WEIGHT: 218 LBS | BODY MASS INDEX: 40.12 KG/M2 | OXYGEN SATURATION: 98 %

## 2021-11-25 PROCEDURE — 99233 SBSQ HOSP IP/OBS HIGH 50: CPT | Performed by: HOSPITALIST

## 2021-11-25 NOTE — PROGRESS NOTES
POD 6    Pt resting in bed. No c/o shortness of breath. No c/o headaches/visual changes/abd pain. Alert and oriented nad.   vss  Chest cta  Heart rrr  abd soft, nontender, incision c/d/i, pos bs, fundus firm and nontender  Ext nt, scds    POD 6  Pt be

## 2021-11-25 NOTE — PLAN OF CARE
Ambulatory, independent, on room air. SpO2 reading at 97% after walking in room and villalobos. Denies pain or shortness of breath. Incision clean, dry, intact. Denies clots or bleeding.  Voiding s/p kaye catheter removal.     Reviewed discharge instruction wi tubes/drains.  - Obtain immunization and exposure to communicable diseases history. Outcome: Adequate for Discharge  Goal: Optimize infant feeding at the breast  Description: INTERVENTIONS:  - Initiate breast feeding within first hour after birth.    - Mon for Discharge  Goal: Experiences normal breast weaning course  Description: INTERVENTIONS:  - Assess for and manage engorgement. - Instruct on breast care. - Provide comfort measures.   Outcome: Adequate for Discharge  Goal: Appropriate maternal -  home; allow for food preferences  - Enhance eating environment  Outcome: Adequate for Discharge  Goal: Achieves appropriate nutritional intake (bariatric)  Description: INTERVENTIONS:  - Monitor for over-consumption  - Identify factors contributing to incr

## 2021-11-25 NOTE — PROGRESS NOTES
[facility]    Progress Note    April KHADIJAH Collins Patient Status:  Inpatient    10/24/1980 MRN [id number]   Location [facility] 3SE Attending Kenna Luke MD   [facility] Day # 6 PCP Cliff Beard DO       Subjective:   Arlette Matt 11/23/2021 at 2:01 PM          CT CHEST PE AORTA (IV ONLY) (CPT=71260)    Result Date: 11/23/2021  CONCLUSION:  1. No evidence of acute pulmonary embolism to the level of the proximal segment pulmonary artery branches.   2. Diffuse bilateral pulmonary inter Daily     calcium gluconate, albuterol, HYDROcodone-acetaminophen, glucose **OR** glucose-vitamin C **OR** dextrose **OR** glucose **OR** glucose-vitamin C, fentaNYL citrate, fentaNYL citrate, albuterol, ammonia aromatic, ondansetron, gabapentin, witch haz

## 2021-11-26 ENCOUNTER — PATIENT OUTREACH (OUTPATIENT)
Dept: CASE MANAGEMENT | Age: 41
End: 2021-11-26

## 2021-11-26 ENCOUNTER — TELEPHONE (OUTPATIENT)
Dept: OBGYN CLINIC | Facility: CLINIC | Age: 41
End: 2021-11-26

## 2021-11-26 NOTE — PROGRESS NOTES
1st attempt 2w OBGYN Post Partum / Blood pressure check apt request (delivered 11/19) (dc 11/25)     [facility], [address]  [address] [address]  [phone]  Blood Pressure - Mon 11/29 3

## 2021-11-26 NOTE — TELEPHONE ENCOUNTER
Patient has nurse visit appt on 11/29/2021 for blood pressure check. Can have staple removal on 11/29/2021 appt.

## 2021-11-29 ENCOUNTER — NURSE ONLY (OUTPATIENT)
Dept: OBGYN CLINIC | Facility: CLINIC | Age: 41
End: 2021-11-29

## 2021-11-29 VITALS — TEMPERATURE: 98 F | SYSTOLIC BLOOD PRESSURE: 122 MMHG | DIASTOLIC BLOOD PRESSURE: 80 MMHG

## 2021-11-29 DIAGNOSIS — Z01.30 BLOOD PRESSURE CHECK: ICD-10-CM

## 2021-11-29 DIAGNOSIS — Z48.02 ENCOUNTER FOR STAPLE REMOVAL: Primary | ICD-10-CM

## 2021-11-29 PROCEDURE — 3079F DIAST BP 80-89 MM HG: CPT | Performed by: ADVANCED PRACTICE MIDWIFE

## 2021-11-29 PROCEDURE — 99024 POSTOP FOLLOW-UP VISIT: CPT | Performed by: ADVANCED PRACTICE MIDWIFE

## 2021-11-29 PROCEDURE — 3074F SYST BP LT 130 MM HG: CPT | Performed by: ADVANCED PRACTICE MIDWIFE

## 2021-11-29 NOTE — PROGRESS NOTES
Pt Name and  verified. 21  1542   BP: 122/80   Temp: 98.1 °F (36.7 °C)     Pt here today for Staple Removal. Pt has a vertical incision and wound was intact and approximated with some staples at bottom of incision adhered to skin.  No dehiscence

## 2021-11-29 NOTE — DISCHARGE SUMMARY
[place] [facility] - [facility]    OB/GYNE Discharge Note       Dennis Patient Status:  Inpatient    10/24/1980 MRN [id number]   Location [facility] 3SE Attending No att. providers found   [address] Day # 6 PCP Miguel Beard, DO     Admit date:

## 2021-12-09 ENCOUNTER — TELEPHONE (OUTPATIENT)
Dept: OBGYN UNIT | Facility: HOSPITAL | Age: 41
End: 2021-12-09

## 2021-12-13 ENCOUNTER — HOSPITAL ENCOUNTER (OUTPATIENT)
Facility: HOSPITAL | Age: 41
Setting detail: OBSERVATION
Discharge: HOME | End: 2021-12-14
Attending: EMERGENCY MEDICINE | Admitting: OBSTETRICS & GYNECOLOGY
Payer: COMMERCIAL

## 2021-12-13 ENCOUNTER — POSTPARTUM (OUTPATIENT)
Dept: OBGYN CLINIC | Facility: CLINIC | Age: 41
End: 2021-12-13

## 2021-12-13 VITALS
WEIGHT: 189 LBS | SYSTOLIC BLOOD PRESSURE: 150 MMHG | HEART RATE: 76 BPM | BODY MASS INDEX: 35 KG/M2 | DIASTOLIC BLOOD PRESSURE: 100 MMHG

## 2021-12-13 DIAGNOSIS — I10 HYPERTENSION, UNSPECIFIED TYPE: Primary | ICD-10-CM

## 2021-12-13 DIAGNOSIS — J81.0 ACUTE PULMONARY EDEMA (HCC): ICD-10-CM

## 2021-12-13 DIAGNOSIS — Z98.890 POSTOPERATIVE STATE: ICD-10-CM

## 2021-12-13 DIAGNOSIS — O10.919 CHRONIC HYPERTENSION AFFECTING PREGNANCY: ICD-10-CM

## 2021-12-13 PROBLEM — N85.3 SUBINVOLUTION OF UTERUS: Status: RESOLVED | Noted: 2019-08-13 | Resolved: 2021-12-13

## 2021-12-13 PROBLEM — N83.201 CYST OF RIGHT OVARY: Status: RESOLVED | Noted: 2019-03-28 | Resolved: 2021-12-13

## 2021-12-13 PROBLEM — O32.2XX0 TRANSVERSE LIE OF FETUS (HCC): Status: RESOLVED | Noted: 2021-11-23 | Resolved: 2021-12-13

## 2021-12-13 PROBLEM — O09.521 MULTIGRAVIDA OF ADVANCED MATERNAL AGE IN FIRST TRIMESTER (HCC): Status: RESOLVED | Noted: 2021-04-20 | Resolved: 2021-12-13

## 2021-12-13 PROBLEM — O32.2XX0 TRANSVERSE LIE OF FETUS: Status: RESOLVED | Noted: 2021-11-23 | Resolved: 2021-12-13

## 2021-12-13 PROBLEM — T83.32XA IUD THREADS LOST: Status: RESOLVED | Noted: 2018-04-06 | Resolved: 2021-12-13

## 2021-12-13 PROBLEM — N83.299 OVARIAN CYST, COMPLEX: Status: RESOLVED | Noted: 2019-03-19 | Resolved: 2021-12-13

## 2021-12-13 PROBLEM — O09.521 MULTIGRAVIDA OF ADVANCED MATERNAL AGE IN FIRST TRIMESTER: Status: RESOLVED | Noted: 2021-04-20 | Resolved: 2021-12-13

## 2021-12-13 PROCEDURE — 99219 INITIAL OBSERVATION CARE,LEVL II: CPT | Performed by: HOSPITALIST

## 2021-12-13 PROCEDURE — 3077F SYST BP >= 140 MM HG: CPT | Performed by: OBSTETRICS & GYNECOLOGY

## 2021-12-13 PROCEDURE — 99220 INITIAL OBSERVATION CARE,LEVL III: CPT | Performed by: OBSTETRICS & GYNECOLOGY

## 2021-12-13 PROCEDURE — 3080F DIAST BP >= 90 MM HG: CPT | Performed by: OBSTETRICS & GYNECOLOGY

## 2021-12-13 PROCEDURE — 99213 OFFICE O/P EST LOW 20 MIN: CPT | Performed by: OBSTETRICS & GYNECOLOGY

## 2021-12-13 RX ORDER — ALBUTEROL SULFATE 90 UG/1
2 AEROSOL, METERED RESPIRATORY (INHALATION) EVERY 6 HOURS PRN
Status: DISCONTINUED | OUTPATIENT
Start: 2021-12-13 | End: 2021-12-14

## 2021-12-13 RX ORDER — LABETALOL HYDROCHLORIDE 5 MG/ML
80 INJECTION, SOLUTION INTRAVENOUS ONCE AS NEEDED
Status: DISCONTINUED | OUTPATIENT
Start: 2021-12-13 | End: 2021-12-13

## 2021-12-13 RX ORDER — ACETAMINOPHEN 500 MG
500 TABLET ORAL EVERY 6 HOURS PRN
Status: DISCONTINUED | OUTPATIENT
Start: 2021-12-13 | End: 2021-12-13

## 2021-12-13 RX ORDER — METOPROLOL SUCCINATE 50 MG/1
50 TABLET, EXTENDED RELEASE ORAL NIGHTLY
Status: DISCONTINUED | OUTPATIENT
Start: 2021-12-13 | End: 2021-12-14

## 2021-12-13 RX ORDER — HYDRALAZINE HYDROCHLORIDE 20 MG/ML
10 INJECTION INTRAMUSCULAR; INTRAVENOUS ONCE AS NEEDED
Status: DISCONTINUED | OUTPATIENT
Start: 2021-12-13 | End: 2021-12-13

## 2021-12-13 RX ORDER — LABETALOL HYDROCHLORIDE 5 MG/ML
20 INJECTION, SOLUTION INTRAVENOUS ONCE
Status: DISCONTINUED | OUTPATIENT
Start: 2021-12-13 | End: 2021-12-13

## 2021-12-13 RX ORDER — LABETALOL HYDROCHLORIDE 5 MG/ML
40 INJECTION, SOLUTION INTRAVENOUS ONCE AS NEEDED
Status: DISCONTINUED | OUTPATIENT
Start: 2021-12-13 | End: 2021-12-13

## 2021-12-13 RX ORDER — HYDRALAZINE HYDROCHLORIDE 20 MG/ML
10 INJECTION INTRAMUSCULAR; INTRAVENOUS EVERY 4 HOURS PRN
Status: DISCONTINUED | OUTPATIENT
Start: 2021-12-13 | End: 2021-12-14

## 2021-12-13 RX ORDER — FLUOXETINE HYDROCHLORIDE 20 MG/1
20 CAPSULE ORAL DAILY
Status: DISCONTINUED | OUTPATIENT
Start: 2021-12-14 | End: 2021-12-14

## 2021-12-13 RX ORDER — HYDRALAZINE HYDROCHLORIDE 20 MG/ML
10 INJECTION INTRAMUSCULAR; INTRAVENOUS ONCE
Status: COMPLETED | OUTPATIENT
Start: 2021-12-13 | End: 2021-12-13

## 2021-12-13 RX ORDER — ACETAMINOPHEN 500 MG
500 TABLET ORAL EVERY 6 HOURS PRN
Status: DISCONTINUED | OUTPATIENT
Start: 2021-12-13 | End: 2021-12-14

## 2021-12-13 RX ORDER — LABETALOL HYDROCHLORIDE 5 MG/ML
20 INJECTION, SOLUTION INTRAVENOUS ONCE AS NEEDED
Status: DISCONTINUED | OUTPATIENT
Start: 2021-12-13 | End: 2021-12-13

## 2021-12-13 RX ORDER — AMLODIPINE BESYLATE 5 MG/1
5 TABLET ORAL DAILY
Status: DISCONTINUED | OUTPATIENT
Start: 2021-12-13 | End: 2021-12-14

## 2021-12-13 NOTE — ED PROVIDER NOTES
Patient Seen in: [facility] AND [facility] Emergency Department      History   Patient presents with:  Hypertension    Stated Complaint: hypertension    Subjective:   HPI  Patient is a  @ 3 weeks 3 days post partum from , postpartum hypertension wit systems reviewed and negative except as noted above.     Physical Exam     ED Triage Vitals [12/13/21 1225]   BP (!) 148/99   Pulse 69   Resp 18   Temp 97.9 °F (36.6 °C)   Temp src    SpO2 99 %   O2 Device None (Room air)       Current:/84   Pulse 66 HEPATIC FUNCTION PANEL (7) - Normal   CBC WITH DIFFERENTIAL WITH PLATELET    Narrative: The following orders were created for panel order CBC With Differential With Platelet.   Procedure                               Abnormality         Status Hypertension I10 12/13/2021 Unknown

## 2021-12-13 NOTE — ED INITIAL ASSESSMENT (HPI)
Patient had  delivery on . Patient was seen at [facility] office today for post partum visit and her blood pressure was elevated. Patient states she takes blood pressure medication. Patient denies any symptoms.

## 2021-12-13 NOTE — PROGRESS NOTES
Subjective:   Patient ID: April Lanre Wilde is a 46year old female.     HPI  postpartum follow-up  patient delivered by primary classical  section  37 weeks for history of previous large myomectomy, transverse lie; insulin-dependent diabet if this has not been done yet.     Patient feels better. She denies chest pain, shortness of breath, dizziness. Denies abdominal pain. Has had no heavy vaginal bleeding at home. Patient had postpartum anemia that required a unit of blood transfusion. 12/13/2021)       Allergies:   Other                   Runny nose, Coughing    Comment:environmental tree's, grass, dust, feathers    Past Medical History:   Diagnosis Date   • Allergic rhinitis    • Allergy    • Anxiety state    • Asthma    • Bell's palsy

## 2021-12-13 NOTE — ED NOTES
Orders for admission, patient is aware of plan and ready to go upstairs. Any questions, please call ED RN [address]  at extension [phone].     Type of COVID test sent:rapid  COVID Suspicion level: Low     Titratable drug(s) infusin  Rate:    LOC at time of tr

## 2021-12-13 NOTE — CONSULTS
[facility]    PATIENT'S NAME: Carmine York   ATTENDING PHYSICIAN: Wojciech Mao MD   CONSULTING PHYSICIAN: Kalpesh Peraza MD   PATIENT ACCOUNT#:   [id number]    LOCATION:  [facility] [address] [facility]  MEDICAL RECORD #:   [id number]       DATE OF BIR GENERAL:  Alert and oriented to time, place, and person. No acute distress. VITAL SIGNS:  Temperature 97.9, pulse 66, respiratory rate 18, blood pressure 142/83, pulse ox 98% on room air. HEENT:  Atraumatic. Oropharynx clear.   Moist mucous membrane

## 2021-12-14 VITALS
TEMPERATURE: 98 F | WEIGHT: 189 LBS | RESPIRATION RATE: 16 BRPM | DIASTOLIC BLOOD PRESSURE: 68 MMHG | SYSTOLIC BLOOD PRESSURE: 114 MMHG | BODY MASS INDEX: 34.78 KG/M2 | HEIGHT: 62 IN | OXYGEN SATURATION: 97 % | HEART RATE: 69 BPM

## 2021-12-14 PROCEDURE — 99217 OBSERVATION CARE DISCHARGE: CPT | Performed by: HOSPITALIST

## 2021-12-14 RX ORDER — AMLODIPINE BESYLATE 5 MG/1
5 TABLET ORAL DAILY
Qty: 30 TABLET | Refills: 2 | Status: SHIPPED | OUTPATIENT
Start: 2021-12-14

## 2021-12-14 RX ORDER — FLUOXETINE HYDROCHLORIDE 20 MG/1
20 CAPSULE ORAL DAILY
Status: SHIPPED | COMMUNITY
Start: 2021-12-14

## 2021-12-14 NOTE — PROGRESS NOTES
Dr. Lizette Bower hospitlaist paged on call to receive orders. Orders received to restart home medications and only for blood pressure medication orders. Dr. Navjot Duran paged on call for further orders for patient as he is primary care provider.  Orders rece

## 2021-12-14 NOTE — PROGRESS NOTES
[facility]    OB/GYNE Progress Note       Dennis Patient Status:  Observation    10/24/1980 MRN [id number]   Location [facility] 3SE Attending Mary Ann Whyte MD   Hosp Day # 0 PCP Shantel Mares.  DO Kisha     Assessment & (H) 11/23/2021    TROP <0.045 07/02/2021    RPR Non-Reactive 12/20/2011    COLORUR Yellow 12/13/2021    CLARITY Hazy (A) 12/13/2021    SPECGRAVITY 1.015 12/13/2021    PROUR Negative 12/13/2021    GLUUR Negative 12/13/2021    KETUR Negative 12/13/2021    BI

## 2021-12-14 NOTE — H&P
[facility]    History & Physical     Patient Status:  Observation    10/24/1980 MRN [id number]   Location [facility] 3SE Attending Dior Mcpherson MD   Hosp Day # 0 PCP Rosanne Pitts.  DO Kisha     Date of Admission 20 mg by mouth daily.), Disp: 30 capsule, Rfl: 0  OneTouch Delica Lancets 19U Does not apply Misc, Check blood sugar fasting MWF and at bedtime Tu Th Sat and Sun, Disp: 100 each, Rfl: 3  ONETOUCH ULTRASOFT LANCETS Does not apply Misc, check sugar fasting M Types: Cigarettes      Smokeless tobacco: Never Used      Tobacco comment: smoked as a teenager    Alcohol use: Not Currently      Alcohol/week: 0.8 standard drinks      Types: 1 Glasses of wine per week      Comment: Rarely       Review of Systems:     no

## 2021-12-14 NOTE — DISCHARGE SUMMARY
[place] [facility] - [facility]    Discharge Summary     Nathanielmary grace Taylorlles Patient Status:  Observation    10/24/1980 MRN [id number]   Location [facility] 3SE Attending No att. providers found   [address] Day # 0 PCP Nahomy Beard,      Date of Admiss ECHO showed [facility]. Hospital Course:     Essential hypertension. 4 weeks post-partum state. Per OB/GYN there is no evidence of preeclampsia. Pt was admitted and kept on her Toprol 50 mg nightly.      Pt was started on norvasc 5 mg daily with improvemen ProAir RespiClick 298 (90 Base) MCG/ACT Aepb  Generic drug: Albuterol Sulfate      INHALE 2 PUFFS INTO THE LUNGS EVERY 4 TO 6 HOURS AS NEEDED.    Quantity: 1 each  Refills: 0        STOP taking these medications    HYDROcodone-acetaminophen 5-325 MG Tabs

## 2021-12-17 ENCOUNTER — NURSE ONLY (OUTPATIENT)
Dept: OBGYN CLINIC | Facility: CLINIC | Age: 41
End: 2021-12-17

## 2021-12-17 VITALS — DIASTOLIC BLOOD PRESSURE: 84 MMHG | SYSTOLIC BLOOD PRESSURE: 132 MMHG

## 2021-12-17 PROCEDURE — 3075F SYST BP GE 130 - 139MM HG: CPT | Performed by: OBSTETRICS & GYNECOLOGY

## 2021-12-17 PROCEDURE — 3079F DIAST BP 80-89 MM HG: CPT | Performed by: OBSTETRICS & GYNECOLOGY

## 2021-12-17 NOTE — PROGRESS NOTES
Patient came in for b/p check today, delivered 11[birth year]  b/p 136/86, 132/84 presently on amlodipine 5mg and metoprolol 50mg, denies any recent headaches. Per patient b/p 's at home wnl  120's /80's routed to provider on call.  Patient's next jen is on 28th

## 2021-12-30 ENCOUNTER — TELEPHONE (OUTPATIENT)
Dept: OBGYN CLINIC | Facility: CLINIC | Age: 41
End: 2021-12-30

## 2021-12-30 NOTE — TELEPHONE ENCOUNTER
Pt has an appointment with SO today, pt states its just for a BP check and requesting a later appointment, dont have anything else to offer, pt requesting message be sent to clinical staff.  Please advise

## 2021-12-30 NOTE — TELEPHONE ENCOUNTER
Patient name and  verified. Patient had appt today for bp check. Patient states blood pressures at home have all been normal 120/70's and has no complaints. Advised to keep pp appt scheduled with AJB.

## 2022-01-07 ENCOUNTER — IMMUNIZATION (OUTPATIENT)
Dept: LAB | Facility: HOSPITAL | Age: 42
End: 2022-01-07
Attending: EMERGENCY MEDICINE
Payer: COMMERCIAL

## 2022-01-07 ENCOUNTER — POSTPARTUM (OUTPATIENT)
Dept: OBGYN CLINIC | Facility: CLINIC | Age: 42
End: 2022-01-07
Payer: COMMERCIAL

## 2022-01-07 VITALS — WEIGHT: 188.19 LBS | BODY MASS INDEX: 34 KG/M2 | DIASTOLIC BLOOD PRESSURE: 84 MMHG | SYSTOLIC BLOOD PRESSURE: 122 MMHG

## 2022-01-07 DIAGNOSIS — I10 HYPERTENSION, UNSPECIFIED TYPE: ICD-10-CM

## 2022-01-07 DIAGNOSIS — Z23 NEED FOR VACCINATION: Primary | ICD-10-CM

## 2022-01-07 PROBLEM — O34.29 PREGNANCY WITH HISTORY OF UTERINE MYOMECTOMY: Status: RESOLVED | Noted: 2021-10-12 | Resolved: 2022-01-07

## 2022-01-07 PROBLEM — O24.419 GDM, CLASS A2: Status: RESOLVED | Noted: 2021-07-13 | Resolved: 2022-01-07

## 2022-01-07 PROBLEM — O24.419 GDM, CLASS A2 (HCC): Status: RESOLVED | Noted: 2021-07-13 | Resolved: 2022-01-07

## 2022-01-07 PROBLEM — R31.21 ASYMPTOMATIC MICROSCOPIC HEMATURIA: Status: RESOLVED | Noted: 2021-09-28 | Resolved: 2022-01-07

## 2022-01-07 PROBLEM — O34.29 PREGNANCY WITH HISTORY OF UTERINE MYOMECTOMY (HCC): Status: RESOLVED | Noted: 2021-10-12 | Resolved: 2022-01-07

## 2022-01-07 PROBLEM — O34.29 PREGNANCY W/ HX OF UTERINE MYOMECTOMY (HCC): Status: RESOLVED | Noted: 2021-11-19 | Resolved: 2022-01-07

## 2022-01-07 PROBLEM — O34.29 PREGNANCY W/ HX OF UTERINE MYOMECTOMY: Status: RESOLVED | Noted: 2021-11-19 | Resolved: 2022-01-07

## 2022-01-07 PROBLEM — O10.919 CHRONIC HYPERTENSION AFFECTING PREGNANCY: Status: RESOLVED | Noted: 2021-07-13 | Resolved: 2022-01-07

## 2022-01-07 PROBLEM — O10.919 CHRONIC HYPERTENSION AFFECTING PREGNANCY (HCC): Status: RESOLVED | Noted: 2021-07-13 | Resolved: 2022-01-07

## 2022-01-07 PROCEDURE — 0064A SARSCOV2 VAC 50MCG/0.25ML IM: CPT

## 2022-01-07 PROCEDURE — 3074F SYST BP LT 130 MM HG: CPT | Performed by: OBSTETRICS & GYNECOLOGY

## 2022-01-07 PROCEDURE — 3079F DIAST BP 80-89 MM HG: CPT | Performed by: OBSTETRICS & GYNECOLOGY

## 2022-01-07 NOTE — PROGRESS NOTES
HPI:    Patient ID:  Inga Rodríguez is a 46year old year old female. HPI  6-week postpartum visit  Status post primary classical  section. Patient with chronic hypertension and diabetes.   Had susannah post partum course postoperative course inclu THE LUNGS EVERY 4 TO 6 HOURS AS NEEDED. 1 each 0       Physical Exam     Vitals: /84   Wt 188 lb 3.2 oz (85.4 kg)   LMP 03/05/2021 (Exact Date)   Breastfeeding Yes   BMI 34.42 kg/m²   Neck: Supple and without masses. No cervical adenopathy.     Breas 27-0.8-228 MG Oral Cap, Take 1 capsule by mouth daily. , Disp: , Rfl:   Levocetirizine Dihydrochloride 5 MG Oral Tab, TAKE 1 TABLET BY MOUTH NIGHTLY., Disp: 90 tablet, Rfl: 1  Misc.  Devices (BREAST PUMP) Does not apply Misc, DOUBLE ELECTRIC BREAST PUMP EQUI

## 2022-02-17 RX ORDER — METOPROLOL SUCCINATE 50 MG/1
50 TABLET, EXTENDED RELEASE ORAL DAILY
Qty: 60 TABLET | Refills: 2 | Status: CANCELLED | OUTPATIENT
Start: 2022-02-17

## 2022-03-14 ENCOUNTER — PATIENT MESSAGE (OUTPATIENT)
Dept: FAMILY MEDICINE CLINIC | Facility: CLINIC | Age: 42
End: 2022-03-14

## 2022-03-14 NOTE — TELEPHONE ENCOUNTER
LOV 2019, patient advised to schedule a visit.     Please advise, medication never prescribed by clinic

## 2022-03-15 RX ORDER — AMLODIPINE BESYLATE 5 MG/1
5 TABLET ORAL DAILY
Qty: 90 TABLET | Refills: 0 | Status: SHIPPED | OUTPATIENT
Start: 2022-03-15

## 2022-04-11 ENCOUNTER — OFFICE VISIT (OUTPATIENT)
Dept: FAMILY MEDICINE CLINIC | Facility: CLINIC | Age: 42
End: 2022-04-11
Payer: COMMERCIAL

## 2022-04-11 ENCOUNTER — LAB ENCOUNTER (OUTPATIENT)
Dept: LAB | Age: 42
End: 2022-04-11
Attending: FAMILY MEDICINE
Payer: COMMERCIAL

## 2022-04-11 ENCOUNTER — TELEPHONE (OUTPATIENT)
Dept: FAMILY MEDICINE CLINIC | Facility: CLINIC | Age: 42
End: 2022-04-11

## 2022-04-11 VITALS
HEART RATE: 66 BPM | BODY MASS INDEX: 35.25 KG/M2 | WEIGHT: 191.56 LBS | HEIGHT: 62 IN | DIASTOLIC BLOOD PRESSURE: 82 MMHG | SYSTOLIC BLOOD PRESSURE: 131 MMHG

## 2022-04-11 DIAGNOSIS — R73.9 HYPERGLYCEMIA: ICD-10-CM

## 2022-04-11 DIAGNOSIS — R73.9 HYPERGLYCEMIA: Primary | ICD-10-CM

## 2022-04-11 DIAGNOSIS — I10 HYPERTENSION, UNSPECIFIED TYPE: ICD-10-CM

## 2022-04-11 LAB
ALBUMIN SERPL-MCNC: 3.8 G/DL (ref 3.4–5)
ALBUMIN/GLOB SERPL: 1.1 {RATIO} (ref 1–2)
ALP LIVER SERPL-CCNC: 92 U/L
ALT SERPL-CCNC: 28 U/L
ANION GAP SERPL CALC-SCNC: 5 MMOL/L (ref 0–18)
AST SERPL-CCNC: 12 U/L (ref 15–37)
BILIRUB SERPL-MCNC: 0.7 MG/DL (ref 0.1–2)
BUN BLD-MCNC: 13 MG/DL (ref 7–18)
BUN/CREAT SERPL: 18.8 (ref 10–20)
CALCIUM BLD-MCNC: 9.4 MG/DL (ref 8.5–10.1)
CHLORIDE SERPL-SCNC: 102 MMOL/L (ref 98–112)
CHOLEST SERPL-MCNC: 197 MG/DL (ref ?–200)
CO2 SERPL-SCNC: 31 MMOL/L (ref 21–32)
CREAT BLD-MCNC: 0.69 MG/DL
EST. AVERAGE GLUCOSE BLD GHB EST-MCNC: 169 MG/DL (ref 68–126)
FASTING PATIENT LIPID ANSWER: YES
FASTING STATUS PATIENT QL REPORTED: YES
GLOBULIN PLAS-MCNC: 3.6 G/DL (ref 2.8–4.4)
GLUCOSE BLD-MCNC: 126 MG/DL (ref 70–99)
HBA1C MFR BLD: 7.5 % (ref ?–5.7)
HDLC SERPL-MCNC: 52 MG/DL (ref 40–59)
LDLC SERPL CALC-MCNC: 130 MG/DL (ref ?–100)
NONHDLC SERPL-MCNC: 145 MG/DL (ref ?–130)
OSMOLALITY SERPL CALC.SUM OF ELEC: 288 MOSM/KG (ref 275–295)
POTASSIUM SERPL-SCNC: 4.2 MMOL/L (ref 3.5–5.1)
PROT SERPL-MCNC: 7.4 G/DL (ref 6.4–8.2)
SODIUM SERPL-SCNC: 138 MMOL/L (ref 136–145)
TRIGL SERPL-MCNC: 82 MG/DL (ref 30–149)
VLDLC SERPL CALC-MCNC: 15 MG/DL (ref 0–30)

## 2022-04-11 PROCEDURE — 83036 HEMOGLOBIN GLYCOSYLATED A1C: CPT

## 2022-04-11 PROCEDURE — 3051F HG A1C>EQUAL 7.0%<8.0%: CPT | Performed by: FAMILY MEDICINE

## 2022-04-11 PROCEDURE — 36415 COLL VENOUS BLD VENIPUNCTURE: CPT

## 2022-04-11 PROCEDURE — 3075F SYST BP GE 130 - 139MM HG: CPT | Performed by: FAMILY MEDICINE

## 2022-04-11 PROCEDURE — 80061 LIPID PANEL: CPT

## 2022-04-11 PROCEDURE — 3079F DIAST BP 80-89 MM HG: CPT | Performed by: FAMILY MEDICINE

## 2022-04-11 PROCEDURE — 80053 COMPREHEN METABOLIC PANEL: CPT

## 2022-04-11 PROCEDURE — 99214 OFFICE O/P EST MOD 30 MIN: CPT | Performed by: FAMILY MEDICINE

## 2022-04-11 PROCEDURE — 3008F BODY MASS INDEX DOCD: CPT | Performed by: FAMILY MEDICINE

## 2022-04-11 RX ORDER — METOPROLOL SUCCINATE 50 MG/1
50 TABLET, EXTENDED RELEASE ORAL DAILY
Qty: 90 TABLET | Refills: 3 | Status: SHIPPED | OUTPATIENT
Start: 2022-04-11

## 2022-04-11 RX ORDER — AMLODIPINE BESYLATE 5 MG/1
5 TABLET ORAL DAILY
Qty: 90 TABLET | Refills: 3 | Status: SHIPPED | OUTPATIENT
Start: 2022-04-11

## 2022-04-11 RX ORDER — ALBUTEROL SULFATE 90 UG/1
2 POWDER, METERED RESPIRATORY (INHALATION)
Qty: 1 EACH | Refills: 5 | Status: SHIPPED | OUTPATIENT
Start: 2022-04-11

## 2022-04-11 RX ORDER — ALBUTEROL SULFATE 90 UG/1
2 AEROSOL, METERED RESPIRATORY (INHALATION)
Qty: 1 EACH | Refills: 5 | Status: SHIPPED | OUTPATIENT
Start: 2022-04-11

## 2022-04-11 RX ORDER — FLUOXETINE HYDROCHLORIDE 20 MG/1
20 CAPSULE ORAL DAILY
Qty: 90 CAPSULE | Refills: 3 | Status: SHIPPED | OUTPATIENT
Start: 2022-04-11

## 2022-04-11 NOTE — TELEPHONE ENCOUNTER
Fax received from pharmacy message: Alternative requested: Insurance wants generic Albuterol or Ventolin.  Not Respiclick

## 2022-04-11 NOTE — PROGRESS NOTES
Blood pressure 131/82, pulse 66, height 5' 2\" (1.575 m), weight 191 lb 9 oz (86.9 kg), currently breastfeeding. No chest pain occasional dyspnea when at home denies any dyspnea on exertion. History of asthma does not have a working inhaler. Had heart failure after her delivery echocardiogram is scheduled. She has been treated for hypertension for couple of years now. Denies any recent panic attacks her mood is not interrupted her sleep.     Objective heart regular rate rhythm no murmurs    Lungs clear to auscultation no rales rhonchi or wheezes    Assessment history of asthma with dyspnea also hypertension history of heart failure after delivery postpartum    Also history of panic and anxiety    Plan #1 refilled albuterol inhaler #2 refilled amlodipine and metoprolol patient is breast-feeding also renal ultrasound ordered and #3 refilled fluoxetine #4 history of gestational diabetes    Fasting blood test today as patient had gestational diabetes    We will follow with results

## 2022-06-09 ENCOUNTER — TELEPHONE (OUTPATIENT)
Dept: INTERNAL MEDICINE CLINIC | Facility: CLINIC | Age: 42
End: 2022-06-09

## 2022-06-09 NOTE — TELEPHONE ENCOUNTER
Patient is due for an annual [address].  Left msg for patient to return phone call, please schedule appropriately  [address] [place] [address] Connector list.

## 2022-07-01 ENCOUNTER — HOSPITAL ENCOUNTER (OUTPATIENT)
Dept: ULTRASOUND IMAGING | Age: 42
Discharge: HOME | End: 2022-07-01
Attending: FAMILY MEDICINE
Payer: COMMERCIAL

## 2022-07-01 DIAGNOSIS — I10 HYPERTENSION, UNSPECIFIED TYPE: ICD-10-CM

## 2022-07-01 PROCEDURE — 93975 VASCULAR STUDY: CPT | Performed by: FAMILY MEDICINE

## 2022-07-01 PROCEDURE — 76775 US EXAM ABDO BACK WALL LIM: CPT | Performed by: FAMILY MEDICINE

## 2022-07-07 ENCOUNTER — TELEPHONE (OUTPATIENT)
Dept: CASE MANAGEMENT | Age: 42
End: 2022-07-07

## 2022-09-08 ENCOUNTER — TELEPHONE (OUTPATIENT)
Dept: FAMILY MEDICINE CLINIC | Facility: CLINIC | Age: 42
End: 2022-09-08

## 2022-12-01 ENCOUNTER — PATIENT MESSAGE (OUTPATIENT)
Dept: FAMILY MEDICINE CLINIC | Facility: CLINIC | Age: 42
End: 2022-12-01

## 2022-12-01 NOTE — TELEPHONE ENCOUNTER
LOV with  [address] [place] [address] 4/11/22, advised follow-up visit    Last insulin rx from 10/5/21, no recent record if patient still taking

## 2022-12-08 NOTE — TELEPHONE ENCOUNTER
Dr Jonathan Stovall, please advise  Sent to covering provider      Patient states she has an appt next week with PCP for follow up on blood sugar levels and insulin dosing. Patient states that she recently starting using her insulin again and is following the doses set by her Maternal/Fetal Physician from last year after she had her child. Future Appointments   Date Time Provider Paula Burdick   12/16/2022 11:30 AM Levi Browning DO [facility] [place]       Medications requested for refill for [facility] [employer]. Patient needs short term refill until she can follow up with PCP. Levemir Insulin Flex touch-   Patient reports that she takes 18 units in the morning and 40 units at night. Novolog Insulin- patient reports that she is taking  14 units at Breakfast  10 units at Lunch  32 units at [employer]    Per chart review-these doses are not listed in the medication review however the medications are on the history list. Doses were adjusted in pended order based on patient information provided. Order pended for approval/review. Pharmacy and allergies verified.

## 2022-12-12 NOTE — TELEPHONE ENCOUNTER
Dr Lior Carvalho, please advise    Confirming that this will be addressed. Message was sent to covering provider last week. Message was then forwarded to PCP.

## 2022-12-14 NOTE — TELEPHONE ENCOUNTER
Fax received at [address] [place] [address] with following message. New Rx: need Rx for pen needles; please send gauge and length.

## 2022-12-15 RX ORDER — PEN NEEDLE, DIABETIC 31 G X1/4"
1 NEEDLE, DISPOSABLE MISCELLANEOUS
Qty: 500 EACH | Refills: 1 | Status: SHIPPED | OUTPATIENT
Start: 2022-12-15

## 2022-12-15 NOTE — TELEPHONE ENCOUNTER
Refill passed per [address] protocol. Requested Prescriptions   Pending Prescriptions Disp Refills    Insulin Pen Needle (PEN NEEDLES) 31G X 6 MM Does not apply Misc 500 each 1     Sig: Inject 1 each into the skin 5 (five) times daily.  To use with insulin daily       Diabetic Supplies Protocol Passed - 12/15/2022  8:04 AM        Passed - In person appointment or virtual visit in the past 12 mos or appointment in next 3 mos     Recent Outpatient Visits              8 months ago Hyperglycemia    [address], [address], DO    Office Visit    11 months ago Postpartum state    [address] [facility], [address], MD    Postpartum    12 months ago Postpartum hypertension    [address], [address] [facility] [place]     Nurse Only    1 year ago Postpartum hypertension    [address], [address], [address] [facility], MD    Postpartum    1 year ago Encounter for staple removal    [address], [address] [facility], [place]     Nurse Only          Future Appointments         Provider Department Appt Notes    Tomorrow [facility], [facility] [address], DO [address] [facility] Blood sugars                  Recent Outpatient Visits              8 months ago Hyperglycemia    [address], [address], DO    Office Visit    11 months ago Postpartum state    [address] [facility], [address] [place]    Postpartum    12 months ago Postpartum hypertension    [address], [address] [facility] [place]     Nurse Only    1 year ago Postpartum hypertension    [address], [address], [address] [facility], MD    Postpartum    1 year ago Encounter for staple removal    [address], [address] [facility], [facility] Only          Future Appointments         Provider Department Appt Notes    [facility] Blood [facility]

## 2022-12-15 NOTE — TELEPHONE ENCOUNTER
Medications were prescribed on 12/12/22. Patient has follow up scheduled.     Future Appointments   Date Time Provider Paula Burdick   12/16/2022 11:30 AM Blue Little DO [facility] [place]

## 2022-12-16 ENCOUNTER — OFFICE VISIT (OUTPATIENT)
Dept: FAMILY MEDICINE CLINIC | Facility: CLINIC | Age: 42
End: 2022-12-16
Payer: COMMERCIAL

## 2022-12-16 ENCOUNTER — LAB ENCOUNTER (OUTPATIENT)
Dept: LAB | Age: 42
End: 2022-12-16
Attending: FAMILY MEDICINE
Payer: COMMERCIAL

## 2022-12-16 VITALS
HEART RATE: 91 BPM | SYSTOLIC BLOOD PRESSURE: 122 MMHG | BODY MASS INDEX: 34.6 KG/M2 | DIASTOLIC BLOOD PRESSURE: 70 MMHG | HEIGHT: 62 IN | WEIGHT: 188 LBS

## 2022-12-16 DIAGNOSIS — E11.9 TYPE 2 DIABETES MELLITUS WITHOUT COMPLICATION, WITH LONG-TERM CURRENT USE OF INSULIN (HCC): Primary | ICD-10-CM

## 2022-12-16 DIAGNOSIS — E11.9 TYPE 2 DIABETES MELLITUS WITHOUT COMPLICATION, WITH LONG-TERM CURRENT USE OF INSULIN (HCC): ICD-10-CM

## 2022-12-16 DIAGNOSIS — Z79.4 TYPE 2 DIABETES MELLITUS WITHOUT COMPLICATION, WITH LONG-TERM CURRENT USE OF INSULIN (HCC): Primary | ICD-10-CM

## 2022-12-16 DIAGNOSIS — Z79.4 TYPE 2 DIABETES MELLITUS WITHOUT COMPLICATION, WITH LONG-TERM CURRENT USE OF INSULIN (HCC): ICD-10-CM

## 2022-12-16 LAB
ALBUMIN SERPL-MCNC: 3.7 G/DL (ref 3.4–5)
ALBUMIN/GLOB SERPL: 0.9 {RATIO} (ref 1–2)
ALP LIVER SERPL-CCNC: 91 U/L
ALT SERPL-CCNC: 39 U/L
ANION GAP SERPL CALC-SCNC: 9 MMOL/L (ref 0–18)
AST SERPL-CCNC: 24 U/L (ref 15–37)
BILIRUB SERPL-MCNC: 0.8 MG/DL (ref 0.1–2)
BUN BLD-MCNC: 10 MG/DL (ref 7–18)
BUN/CREAT SERPL: 11.9 (ref 10–20)
CALCIUM BLD-MCNC: 9.2 MG/DL (ref 8.5–10.1)
CHLORIDE SERPL-SCNC: 104 MMOL/L (ref 98–112)
CHOLEST SERPL-MCNC: 156 MG/DL (ref ?–200)
CO2 SERPL-SCNC: 24 MMOL/L (ref 21–32)
CREAT BLD-MCNC: 0.84 MG/DL
CREAT UR-SCNC: 502 MG/DL
EST. AVERAGE GLUCOSE BLD GHB EST-MCNC: 200 MG/DL (ref 68–126)
FASTING PATIENT LIPID ANSWER: YES
FASTING STATUS PATIENT QL REPORTED: YES
GFR SERPLBLD BASED ON 1.73 SQ M-ARVRAT: 90 ML/MIN/1.73M2 (ref 60–?)
GLOBULIN PLAS-MCNC: 4.1 G/DL (ref 2.8–4.4)
GLUCOSE BLD-MCNC: 123 MG/DL (ref 70–99)
HBA1C MFR BLD: 8.6 % (ref ?–5.7)
HDLC SERPL-MCNC: 47 MG/DL (ref 40–59)
LDLC SERPL CALC-MCNC: 90 MG/DL (ref ?–100)
MICROALBUMIN UR-MCNC: 115 MG/DL
MICROALBUMIN/CREAT 24H UR-RTO: 229.1 UG/MG (ref ?–30)
NONHDLC SERPL-MCNC: 109 MG/DL (ref ?–130)
OSMOLALITY SERPL CALC.SUM OF ELEC: 284 MOSM/KG (ref 275–295)
POTASSIUM SERPL-SCNC: 4.1 MMOL/L (ref 3.5–5.1)
PROT SERPL-MCNC: 7.8 G/DL (ref 6.4–8.2)
SODIUM SERPL-SCNC: 137 MMOL/L (ref 136–145)
TRIGL SERPL-MCNC: 103 MG/DL (ref 30–149)
TSI SER-ACNC: 1.01 MIU/ML (ref 0.36–3.74)
VLDLC SERPL CALC-MCNC: 17 MG/DL (ref 0–30)

## 2022-12-16 PROCEDURE — 82043 UR ALBUMIN QUANTITATIVE: CPT

## 2022-12-16 PROCEDURE — 80053 COMPREHEN METABOLIC PANEL: CPT

## 2022-12-16 PROCEDURE — 3078F DIAST BP <80 MM HG: CPT | Performed by: FAMILY MEDICINE

## 2022-12-16 PROCEDURE — 82570 ASSAY OF URINE CREATININE: CPT

## 2022-12-16 PROCEDURE — 84443 ASSAY THYROID STIM HORMONE: CPT

## 2022-12-16 PROCEDURE — 3074F SYST BP LT 130 MM HG: CPT | Performed by: FAMILY MEDICINE

## 2022-12-16 PROCEDURE — 80061 LIPID PANEL: CPT

## 2022-12-16 PROCEDURE — 3008F BODY MASS INDEX DOCD: CPT | Performed by: FAMILY MEDICINE

## 2022-12-16 PROCEDURE — 83036 HEMOGLOBIN GLYCOSYLATED A1C: CPT

## 2022-12-16 PROCEDURE — 36415 COLL VENOUS BLD VENIPUNCTURE: CPT

## 2022-12-16 PROCEDURE — 99213 OFFICE O/P EST LOW 20 MIN: CPT | Performed by: FAMILY MEDICINE

## 2022-12-16 NOTE — PROGRESS NOTES
Blood pressure 122/70, pulse 91, height 5' 2\" (1.575 m), weight 188 lb (85.3 kg), currently breastfeeding. Following up for diabetes. Was not checking her sugars for some time resumed her insulin as she was taking it previously. Started having menstrual periods again more than 6 months ago is no longer breast-feeding.     Objective patient comfortable no apparent distress    Assessment type 2 diabetes    Plan blood and urine testing ordered today    Eye referral    Will follow

## 2023-01-09 RX ORDER — INSULIN DETEMIR 100 [IU]/ML
INJECTION, SOLUTION SUBCUTANEOUS
Qty: 10 EACH | Refills: 0 | Status: SHIPPED | OUTPATIENT
Start: 2023-01-09

## 2023-01-28 RX ORDER — LANCETS 33 GAUGE
1 EACH MISCELLANEOUS DAILY
Qty: 100 EACH | Refills: 11 | Status: SHIPPED | OUTPATIENT
Start: 2023-01-28 | End: 2023-02-17

## 2023-01-28 NOTE — TELEPHONE ENCOUNTER
Routed to Dr Janene Mcfadden for advise, thanks. See below message, is it ok to check pt bld sugar 4-5 times day? Refill passed per [facility] protocol   Requested Prescriptions   Pending Prescriptions Disp Refills    OneTouch Delica Lancets 01Q Does not apply Misc 100 each 3     Si lancet. by Finger stick route daily.        Diabetic Supplies Protocol Passed - 2023 10:17 PM        Passed - In person appointment or virtual visit in the past 12 mos or appointment in next 3 mos     Recent Outpatient Visits              1 month ago Type 2 diabetes mellitus without complication, with long-term current use of insulin ([facility]    [address], [address] [facility], [place] Shanon Rod, Sheila Arriaza, DO    Office Visit    9 months ago Hyperglycemia    [facility], [address] [place] [address], Jules Antonio, DO    Office Visit    1 year ago Postpartum state    [address], [address], Samantha Castellano MD    Postpartum    1 year ago Postpartum hypertension    [facility], [address]    Nurse Only    1 year ago Postpartum hypertension    [address], [address], Samantha Castellano MD    Postpartum

## 2023-01-31 ENCOUNTER — HOSPITAL ENCOUNTER (OUTPATIENT)
Age: 43
Discharge: HOME | End: 2023-01-31
Payer: COMMERCIAL

## 2023-01-31 VITALS
SYSTOLIC BLOOD PRESSURE: 154 MMHG | RESPIRATION RATE: 20 BRPM | TEMPERATURE: 99 F | DIASTOLIC BLOOD PRESSURE: 84 MMHG | OXYGEN SATURATION: 100 % | HEART RATE: 104 BPM

## 2023-01-31 DIAGNOSIS — H66.91 RIGHT ACUTE OTITIS MEDIA: Primary | ICD-10-CM

## 2023-01-31 DIAGNOSIS — R03.0 ELEVATED BLOOD PRESSURE READING: ICD-10-CM

## 2023-01-31 DIAGNOSIS — Z20.822 ENCOUNTER FOR LABORATORY TESTING FOR COVID-19 VIRUS: ICD-10-CM

## 2023-01-31 DIAGNOSIS — R52 GENERALIZED BODY ACHES: ICD-10-CM

## 2023-01-31 DIAGNOSIS — R50.9 FEVER: ICD-10-CM

## 2023-01-31 DIAGNOSIS — R11.2 NAUSEA AND VOMITING IN ADULT: ICD-10-CM

## 2023-01-31 LAB
POCT INFLUENZA A: NEGATIVE
POCT INFLUENZA B: NEGATIVE
SARS-COV-2 RNA RESP QL NAA+PROBE: NOT DETECTED

## 2023-01-31 PROCEDURE — U0002 COVID-19 LAB TEST NON-CDC: HCPCS | Performed by: PHYSICIAN ASSISTANT

## 2023-01-31 PROCEDURE — 99213 OFFICE O/P EST LOW 20 MIN: CPT | Performed by: PHYSICIAN ASSISTANT

## 2023-01-31 PROCEDURE — 87502 INFLUENZA DNA AMP PROBE: CPT | Performed by: PHYSICIAN ASSISTANT

## 2023-01-31 RX ORDER — AMOXICILLIN AND CLAVULANATE POTASSIUM 875; 125 MG/1; MG/1
1 TABLET, FILM COATED ORAL 2 TIMES DAILY
Qty: 14 TABLET | Refills: 0 | Status: SHIPPED | OUTPATIENT
Start: 2023-01-31 | End: 2023-02-07

## 2023-01-31 RX ORDER — ONDANSETRON 4 MG/1
4 TABLET, ORALLY DISINTEGRATING ORAL EVERY 8 HOURS PRN
Qty: 10 TABLET | Refills: 0 | Status: SHIPPED | OUTPATIENT
Start: 2023-01-31 | End: 2023-02-03

## 2023-01-31 RX ORDER — AMOXICILLIN AND CLAVULANATE POTASSIUM 875; 125 MG/1; MG/1
1 TABLET, FILM COATED ORAL 2 TIMES DAILY
Qty: 14 TABLET | Refills: 0 | Status: SHIPPED | OUTPATIENT
Start: 2023-01-31 | End: 2023-01-31

## 2023-01-31 RX ORDER — IBUPROFEN 600 MG/1
TABLET ORAL
Qty: 20 TABLET | Refills: 0 | Status: SHIPPED | OUTPATIENT
Start: 2023-01-31

## 2023-01-31 RX ORDER — ONDANSETRON 4 MG/1
4 TABLET, ORALLY DISINTEGRATING ORAL EVERY 8 HOURS PRN
Qty: 10 TABLET | Refills: 0 | Status: SHIPPED | OUTPATIENT
Start: 2023-01-31 | End: 2023-01-31

## 2023-01-31 RX ORDER — IBUPROFEN 600 MG/1
TABLET ORAL
Qty: 20 TABLET | Refills: 0 | Status: SHIPPED | OUTPATIENT
Start: 2023-01-31 | End: 2023-01-31

## 2023-02-02 RX ORDER — INSULIN DETEMIR 100 [IU]/ML
INJECTION, SOLUTION SUBCUTANEOUS
Qty: 10 EACH | Refills: 0 | Status: SHIPPED | OUTPATIENT
Start: 2023-02-02

## 2023-02-17 RX ORDER — LANCETS 33 GAUGE
EACH MISCELLANEOUS
Qty: 300 EACH | Refills: 11 | Status: SHIPPED | OUTPATIENT
Start: 2023-02-17

## 2023-02-17 NOTE — TELEPHONE ENCOUNTER
Patient states that she checks her blood sugar 4-5 times a day as initially instructed by maternal fetal medicine doctor and then Dr. Eula Powers. She states she checks it off to figure out if she needs to take fast acting insulin. She states her insurance will only allow her to fill a 90 day supply at a time. She states that 100 lancets only last her about 30 days. New prescription sent to pharmacy to accommodate for checking blood sugar 4-5 times daily.

## 2023-03-13 ENCOUNTER — TELEPHONE (OUTPATIENT)
Dept: FAMILY MEDICINE CLINIC | Facility: CLINIC | Age: 43
End: 2023-03-13

## 2023-03-13 NOTE — TELEPHONE ENCOUNTER
Claudene Captain list [facility]  Patient is due for physical with  please schedule appropriately. [email or web address] message sent to the pt.

## 2023-06-09 ENCOUNTER — PATIENT MESSAGE (OUTPATIENT)
Dept: FAMILY MEDICINE CLINIC | Facility: CLINIC | Age: 43
End: 2023-06-09

## 2023-06-09 ENCOUNTER — HOSPITAL ENCOUNTER (OUTPATIENT)
Age: 43
Discharge: HOME | End: 2023-06-09
Payer: COMMERCIAL

## 2023-06-09 VITALS
DIASTOLIC BLOOD PRESSURE: 79 MMHG | SYSTOLIC BLOOD PRESSURE: 135 MMHG | HEART RATE: 94 BPM | TEMPERATURE: 98 F | OXYGEN SATURATION: 100 % | RESPIRATION RATE: 20 BRPM

## 2023-06-09 DIAGNOSIS — U07.1 COVID-19: Primary | ICD-10-CM

## 2023-06-09 LAB
AMB EXT COVID-19 RESULT: DETECTED
S PYO AG THROAT QL: NEGATIVE
SARS-COV-2 RNA RESP QL NAA+PROBE: DETECTED

## 2023-06-09 RX ORDER — CODEINE PHOSPHATE AND GUAIFENESIN 10; 100 MG/5ML; MG/5ML
5 SOLUTION ORAL EVERY 6 HOURS PRN
Qty: 118 ML | Refills: 0 | Status: SHIPPED | OUTPATIENT
Start: 2023-06-09 | End: 2023-06-16

## 2023-06-09 RX ORDER — BENZOCAINE AND MENTHOL, UNSPECIFIED FORM 15; 2.3 MG/1; MG/1
1 LOZENGE ORAL AS NEEDED
Qty: 16 LOZENGE | Refills: 0 | Status: SHIPPED | OUTPATIENT
Start: 2023-06-09

## 2023-06-09 RX ORDER — BENZONATATE 100 MG/1
100 CAPSULE ORAL 3 TIMES DAILY PRN
Qty: 30 CAPSULE | Refills: 0 | Status: SHIPPED | OUTPATIENT
Start: 2023-06-09 | End: 2023-07-09

## 2023-06-09 NOTE — DISCHARGE INSTRUCTIONS
Please take medication as prescribed. Close follow-up with your primary care provider is recommended. Any shortness of breath, chest pain or any worsening symptoms please go to the emergency department.

## 2023-06-10 ENCOUNTER — NURSE TRIAGE (OUTPATIENT)
Dept: FAMILY MEDICINE CLINIC | Facility: CLINIC | Age: 43
End: 2023-06-10

## 2023-06-11 NOTE — TELEPHONE ENCOUNTER
Kerri Garner RN 6/11/2023 8:04 AM CDT        ----- Message -----  From: Khalida Mckeon  Sent: 6/9/2023 8:37 PM CDT  To: Em Triage Support  Subject: COVID     I tested positive today. The information given to me advised me to follow up with my primary for treatment options. Right now my symptoms include shift neck, sore throat, headache, congestion, mild shortness of breath. Please advise.      Thank you,   April Dennis  [phone]

## 2023-06-12 ENCOUNTER — TELEMEDICINE (OUTPATIENT)
Dept: FAMILY MEDICINE CLINIC | Facility: CLINIC | Age: 43
End: 2023-06-12

## 2023-06-12 DIAGNOSIS — U07.1 COVID-19: Primary | ICD-10-CM

## 2023-07-19 RX ORDER — METOPROLOL SUCCINATE 50 MG/1
50 TABLET, EXTENDED RELEASE ORAL DAILY
Qty: 90 TABLET | Refills: 0 | Status: SHIPPED | OUTPATIENT
Start: 2023-07-19

## 2023-07-19 NOTE — TELEPHONE ENCOUNTER
Please review; protocol failed. No future labs noted       Requested Prescriptions   Pending Prescriptions Disp Refills    METOPROLOL SUCCINATE ER 50 MG Oral Tablet 24 Hr [Pharmacy Med Name: METOPROLOL SUCC ER 50 MG TAB] 90 tablet 0     Sig: Take 1 tablet (50 mg total) by mouth daily. PATIENT NEEDS FASTING BLOOD TEST FOLLOWED BY APPOINTMENT. Hypertensive Medications Protocol Failed - 7/19/2023  2:33 AM        Failed - CMP or BMP in past 6 months     No results found for this or any previous visit (from the past 4392 hour(s)).             Failed - In person appointment or virtual visit in the past 6 months     Recent Outpatient Visits              1 month ago COVID-19    [address], [address] [facility] Chip Almanza Lafe Loan, MD    Telemedicine    7 months ago Type 2 diabetes mellitus without complication, with long-term current use of insulin ([facility].)    [address], [place], Lito Levy, DO    Office Visit    1 year ago Hyperglycemia    [facility], [address], [place] CespedLito patricio, DO    Office Visit    1 year ago Postpartum [address], [place] [address] Andi Gutierrez MD    Postpartum    1 year ago Postpartum hypertension    [facility], [address] [facility]    Nurse Only          Future Appointments         Provider Department Appt Notes    In 1 month Lito Beard, DO [address], [address], [place] physical               Passed - In person appointment in the past 12 or next 3 months     Recent Outpatient Visits              1 month ago COVID-19    [address], [address] [facility] SHONDA Almanza MD    Telemedicine    7 months ago Type 2 diabetes mellitus without complication, with long-term current use of insulin [facility])    [address], [address], [place], Lito Levy, DO    Office Visit    1 year ago Hyperglycemia    [facility], [address], [place], Breann Webb, DO    Office Visit    1 year ago Postpartum state    [facility], [address] Tiffany Ferris MD    Postpartum    1 year ago Postpartum hypertension    [facility], [address] [place] [address], [address] [place] [address]    Nurse Only          Future Appointments         Provider Department Appt Notes    In 1 month Breann Beard DO [facility], [address], [place] physical               Passed - Last BP reading less than 140/90     BP Readings from Last 1 Encounters:  06/09/23 : 135/79              Passed - EGFRCR or GFRAA > 50     GFR Evaluation  EGFRCR: 90 , resulted on 12/16/2022             Future Appointments         Provider Department Appt Notes    In 1 month Breann Beard,  [address], [place] physical          Recent Outpatient Visits              1 month ago COVID-19    Tessa Rodriguez MD    Telemedicine    7 months ago Type 2 diabetes mellitus without complication, with long-term current use of insulin ([facility]    [address], [place] Breann Beard, DO    Office Visit    1 year ago Hyperglycemia    [facility], [facility], Yon Okeefe, DO    Office Visit    1 year ago Postpartum state    [address], [address], Violette Ernst MD    Postpartum    1 year ago Postpartum hypertension    [facility], [facility], [address]    Nurse Only

## 2023-09-02 RX ORDER — AMLODIPINE BESYLATE 5 MG/1
5 TABLET ORAL DAILY
Qty: 90 TABLET | Refills: 0 | Status: SHIPPED | OUTPATIENT
Start: 2023-09-02

## 2023-10-18 NOTE — TELEPHONE ENCOUNTER
Please review. Protocol Failed or has No Protocol. Requested Prescriptions   Pending Prescriptions Disp Refills    METOPROLOL SUCCINATE ER 50 MG Oral Tablet 24 Hr [Pharmacy Med Name: METOPROLOL SUCC ER 50 MG TAB] 90 tablet 0     Sig: TAKE 1 TABLET BY MOUTH DAILY. PATIENT NEEDS FASTING BLOOD TEST FOLLOWED BY APPOINTMENT. Hypertensive Medications Protocol Failed - 10/17/2023 11:34 AM        Failed - CMP or BMP in past 6 months     No results found for this or any previous visit (from the past 4392 hour(s)).             Failed - In person appointment or virtual visit in the past 6 months     Recent Outpatient Visits              4 months ago COVID-19    [address], [address] [facility]    [facility]    10 months ago Type 2 diabetes mellitus without complication, with long-term current use of insulin ([facility]    [address], [place], Kenney Roussel, DO    Office Visit    1 year ago Hyperglycemia    [facility], [address], [place], Kenney Roussel, DO    Office Visit    1 year ago Postpartum state    [address] [facility], [address] Rosalie Edwards MD    Postpartum    1 year ago Postpartum hypertension    [facility], [address] [facility], [address] [facility]    Nurse Only          Future Appointments         Provider Department Appt Notes    In 2 months Natan Nagy, DO [facility], [address], [place] physical  DUE A1c,MICRO ALB/CREAT URINE, DM EYE EXAM                      Passed - In person appointment in the past 12 or next 3 months     Recent Outpatient Visits              4 months ago COVID-19    [address], Padma Sharif MD    Telemedicine    10 months ago Type 2 diabetes mellitus without complication, with long-term current use of insulin [facility])    [address], [address], [place] Linh Pearl, DO    Office Visit    1 year ago Hyperglycemia    [facility], [address], [place] KishaJordan, DO    Office Visit    1 year ago Postpartum state    [address], [address] Judith Cornell MD    Postpartum    1 year ago Postpartum hypertension    [facility], [address] [place] [address], [address]    Nurse Only          Future Appointments         Provider Department Appt Notes    In 2 months Linh Pearl, DO [facility], [address], [place] physical  DUE A1c,MICRO ALB/CREAT URINE, DM EYE EXAM                      Passed - Last BP reading less than 140/90     BP Readings from Last 1 Encounters:  06/09/23 : 135/79              Passed - EGFRCR or GFRAA > 50     GFR Evaluation  EGFRCR: 90 , resulted on 12/16/2022               Recent Outpatient Visits              4 months ago [address], [address], Dionisio Cohen MD    Telemedicine    10 months ago Type 2 diabetes mellitus without complication, with long-term current use of insulin ([facility]    [address], [place], Jordan Rodrigues, DO    Office Visit    1 year ago Hyperglycemia    [facility], [address], [place] Jordan Beard, DO    Office Visit    1 year ago Postpartum state    [address], [address], Conrado Norton MD    Postpartum    1 year ago Postpartum hypertension    [facility], [address] [place] [address], [address]    Nurse Only            Future Appointments         Provider Department Appt Notes    In 2 months Jordan Beard, DO [facility], [address], [place] physical  DUE A1c,MICRO ALB/CREAT URINE, DM EYE EXAM

## 2023-10-20 RX ORDER — METOPROLOL SUCCINATE 50 MG/1
50 TABLET, EXTENDED RELEASE ORAL DAILY
Qty: 30 TABLET | Refills: 0 | Status: SHIPPED | OUTPATIENT
Start: 2023-10-20

## 2023-11-20 ENCOUNTER — TELEPHONE (OUTPATIENT)
Dept: FAMILY MEDICINE CLINIC | Facility: CLINIC | Age: 43
End: 2023-11-20

## 2023-11-20 DIAGNOSIS — E11.9 TYPE 2 DIABETES MELLITUS WITHOUT COMPLICATION, WITH LONG-TERM CURRENT USE OF INSULIN (HCC): Primary | ICD-10-CM

## 2023-11-20 DIAGNOSIS — Z79.4 TYPE 2 DIABETES MELLITUS WITHOUT COMPLICATION, WITH LONG-TERM CURRENT USE OF INSULIN (HCC): Primary | ICD-10-CM

## 2023-11-21 RX ORDER — METOPROLOL SUCCINATE 50 MG/1
50 TABLET, EXTENDED RELEASE ORAL DAILY
Qty: 30 TABLET | Refills: 0 | Status: SHIPPED | OUTPATIENT
Start: 2023-11-21

## 2023-11-21 NOTE — TELEPHONE ENCOUNTER
Please review. Protocol failed / Has no protocol. Requested Prescriptions   Pending Prescriptions Disp Refills    METOPROLOL SUCCINATE ER 50 MG Oral Tablet 24 Hr [Pharmacy Med Name: METOPROLOL SUCC ER 50 MG TAB] 30 tablet 0     Sig: TAKE 1 TABLET BY MOUTH EVERY DAY       Hypertensive Medications Protocol Failed - 11/20/2023 12:55 AM        Failed - CMP or BMP in past 6 months     No results found for this or any previous visit (from the past 4392 hour(s)).             Failed - In person appointment or virtual visit in the past 6 months     Recent Outpatient Visits              5 months ago COVID-19    [address], [address] Chip Quinn Asberry Riggs, MD    Telemedicine    11 months ago Type 2 diabetes mellitus without complication, with long-term current use of insulin ([facility]    [address], [address], [place] [facility]    Office Visit    1 year ago Hyperglycemia    [facility], [address], [place] Cespedes, [place], DO    Office Visit    1 year ago Postpartum state    [address] [facility], [place] [address] Glenna Eid MD    Postpartum    1 year ago Postpartum hypertension    [facility], [address] [facility], [address]    Nurse Only          Future Appointments         Provider Department Appt Notes    In 1 month [facility], DO [facility], [address], [place] physical  DUE A1c,MICRO ALB/CREAT URINE, DM EYE EXAM               Passed - In person appointment in the past 12 or next 3 months     Recent Outpatient Visits              5 months ago COVID-19    [address], [address] [facility] Thierno Almanza MD    Telemedicine    11 months ago Type 2 diabetes mellitus without complication, with long-term current use of insulin [facility])    [address], [address], [place] Ces[place]es, [place], DO    Office Visit    1 year ago Hyperglycemia    [facility], [address], [place] Jenifer Beard, DO    Office Visit    1 year ago Postpartum state    [address], [place] [address] Vladimir Huitron MD    Postpartum    1 year ago Postpartum hypertension    [facility], [address] [place] [address], [address] [facility]    Nurse Only          Future Appointments         Provider Department Appt Notes    In 1 month Jenifer Beard DO [facility], [address], [place] physical  DUE A1c,MICRO ALB/CREAT URINE, DM EYE EXAM               Passed - Last BP reading less than 140/90     BP Readings from Last 1 Encounters:   06/09/23 135/79               Passed - EGFRCR or GFRAA > 50     GFR Evaluation  EGFRCR: 90 , resulted on 12/16/2022             Future Appointments         Provider Department Appt Notes    In 1 month Jenifer Beard DO [facility], [address], [place] physical  DUE A1c,MICRO ALB/CREAT URINE, DM EYE EXAM           Recent Outpatient Visits              5 months ago COVID-19    Nolan Lux MD    Telemedicine    11 months ago Type 2 diabetes mellitus without complication, with long-term current use of insulin ([facility]    [address] [facility],[address], [address], [place]ifer burgos, DO    Office Visit    1 year ago Hyperglycemia    [facility], [facility] [place], DO    Office Visit    1 year ago Postpartum state    [address] [facility],[address], [address] , Jyoti Green MD    Postpartum    1 year ago Postpartum hypertension    [facility], [address] [facility], [address] [facility] [address]    Nurse Only

## 2023-12-05 RX ORDER — AMLODIPINE BESYLATE 5 MG/1
5 TABLET ORAL DAILY
Qty: 90 TABLET | Refills: 0 | Status: SHIPPED | OUTPATIENT
Start: 2023-12-05

## 2023-12-05 NOTE — TELEPHONE ENCOUNTER
Please review. Protocol failed / Has no protocol. Future Appointments   Date Time Provider Paula Burdick   12/21/2023  1:00 PM Peterson Gordon DO [facility] [place] Mychart message sent to patient to complete labs already pended a few days before office visit. Requested Prescriptions   Pending Prescriptions Disp Refills    AMLODIPINE 5 MG Oral Tab [Pharmacy Med Name: AMLODIPINE BESYLATE 5 MG TAB] 90 tablet 0     Sig: Take 1 tablet (5 mg total) by mouth daily. PATIENT NEEDS FASTING BLOOD TEST FOLLOWED BY APPOINTMENT. Hypertensive Medications Protocol Failed - 12/4/2023 12:40 AM        Failed - CMP or BMP in past 6 months     No results found for this or any previous visit (from the past 4392 hour(s)).             Failed - In person appointment or virtual visit in the past 6 months     Recent Outpatient Visits              5 months ago COVID-19    [facility], [address] [place], [place] [address]Chip cheney Pansy Kanaris, MD    Telemedicine    11 months ago Type 2 diabetes mellitus without complication, with long-term current use of insulin ([facility]    [address], [address], [place] Wilhemina Fell, [facility], DO    Office Visit    1 year ago Hyperglycemia    [facility], [address], [place] Cespedes, [facility], DO    Office Visit    1 year ago Postpartum [facility]    Escobar Prakash MD    Postpartum    1 year ago Postpartum hypertension    [facility], [address] [facility], [facility]    Nurse Only          Future Appointments         Provider Department Appt Notes    In 2 weeks Peterson Gordon DO [facility], [address], [place] physical  DUE A1c,MICRO ALB/CREAT URINE, DM EYE EXAM               Passed - In person appointment in the past 12 or next 3 months     Recent Outpatient Visits              5 months ago [facility] Nicholas Paulson, [facility] Dane Almanza Leonor Kovacs MD    Telemedicine    11 months ago Type 2 diabetes mellitus without complication, with long-term current use of insulin ([facility]    [address], [address], [place], Marlin Paget, DO    Office Visit    1 year ago Hyperglycemia    [facility], [address], [place], Marlin Paget, DO    Office Visit    1 year ago Postpartum state    [facility], [place] [address] Suzanne Perry MD    Postpartum    1 year ago Postpartum hypertension    [facility], [address] [facility], [address] [place] [address]    Nurse Only          Future Appointments         Provider Department Appt Notes    In 2 weeks Lisa Taylor DO [facility], [address], [place] physical  DUE A1c,MICRO ALB/CREAT URINE, DM EYE EXAM               Passed - Last BP reading less than 140/90     BP Readings from Last 1 Encounters:   06/09/23 135/79               Passed - EGFRCR or GFRAA > 50     GFR Evaluation  EGFRCR: 90 , resulted on 12/16/2022             Future Appointments         Provider Department Appt Notes    In 2 weeks Cespedes, Marlin Paget,  [facility], [address], [place] physical  DUE A1c,MICRO ALB/CREAT URINE, DM EYE EXAM           Recent Outpatient Visits              5 months ago COVID-19    Homero Cavazos MD    Telemedicine    11 months ago Type 2 diabetes mellitus without complication, with long-term current use of insulin ([facility]    [address], [address], [place] Truman Jo, Marlin Paget, DO    Office Visit    1 year ago Hyperglycemia    [facility], [address], [place], Marlin Paget, DO    Office Visit    1 year ago Postpartum state    Keshawn Irving MD    Postpartum    1 year ago Postpartum hypertension    [facility], [place] [address] [place], [place] - OB/GYN    Nurse Only

## 2023-12-16 ENCOUNTER — LAB ENCOUNTER (OUTPATIENT)
Dept: LAB | Age: 43
End: 2023-12-16
Attending: FAMILY MEDICINE
Payer: COMMERCIAL

## 2023-12-16 DIAGNOSIS — Z79.4 TYPE 2 DIABETES MELLITUS WITHOUT COMPLICATION, WITH LONG-TERM CURRENT USE OF INSULIN (HCC): ICD-10-CM

## 2023-12-16 DIAGNOSIS — E11.9 TYPE 2 DIABETES MELLITUS WITHOUT COMPLICATION, WITH LONG-TERM CURRENT USE OF INSULIN (HCC): ICD-10-CM

## 2023-12-16 LAB
ALBUMIN SERPL-MCNC: 4.3 G/DL (ref 3.2–4.8)
ALBUMIN/GLOB SERPL: 1.4 {RATIO} (ref 1–2)
ALP LIVER SERPL-CCNC: 87 U/L
ALT SERPL-CCNC: 30 U/L
ANION GAP SERPL CALC-SCNC: 7 MMOL/L (ref 0–18)
AST SERPL-CCNC: 20 U/L (ref ?–34)
BILIRUB SERPL-MCNC: 0.9 MG/DL (ref 0.3–1.2)
BUN BLD-MCNC: 11 MG/DL (ref 9–23)
BUN/CREAT SERPL: 13.9 (ref 10–20)
CALCIUM BLD-MCNC: 9.2 MG/DL (ref 8.7–10.4)
CHLORIDE SERPL-SCNC: 103 MMOL/L (ref 98–112)
CHOLEST SERPL-MCNC: 173 MG/DL (ref ?–200)
CO2 SERPL-SCNC: 27 MMOL/L (ref 21–32)
CREAT BLD-MCNC: 0.79 MG/DL
CREAT UR-SCNC: 240.3 MG/DL
EGFRCR SERPLBLD CKD-EPI 2021: 96 ML/MIN/1.73M2 (ref 60–?)
EST. AVERAGE GLUCOSE BLD GHB EST-MCNC: 223 MG/DL (ref 68–126)
FASTING PATIENT LIPID ANSWER: YES
FASTING STATUS PATIENT QL REPORTED: YES
GLOBULIN PLAS-MCNC: 3.1 G/DL (ref 2.8–4.4)
GLUCOSE BLD-MCNC: 191 MG/DL (ref 70–99)
HBA1C MFR BLD: 9.4 % (ref ?–5.7)
HDLC SERPL-MCNC: 44 MG/DL (ref 40–59)
LDLC SERPL CALC-MCNC: 110 MG/DL (ref ?–100)
MICROALBUMIN UR-MCNC: 18.7 MG/DL
MICROALBUMIN/CREAT 24H UR-RTO: 77.8 UG/MG (ref ?–30)
NONHDLC SERPL-MCNC: 129 MG/DL (ref ?–130)
OSMOLALITY SERPL CALC.SUM OF ELEC: 289 MOSM/KG (ref 275–295)
POTASSIUM SERPL-SCNC: 4 MMOL/L (ref 3.5–5.1)
PROT SERPL-MCNC: 7.4 G/DL (ref 5.7–8.2)
SODIUM SERPL-SCNC: 137 MMOL/L (ref 136–145)
TRIGL SERPL-MCNC: 105 MG/DL (ref 30–149)
TSI SER-ACNC: 1.26 MIU/ML (ref 0.55–4.78)
VLDLC SERPL CALC-MCNC: 18 MG/DL (ref 0–30)

## 2023-12-16 PROCEDURE — 36415 COLL VENOUS BLD VENIPUNCTURE: CPT

## 2023-12-16 PROCEDURE — 82043 UR ALBUMIN QUANTITATIVE: CPT

## 2023-12-16 PROCEDURE — 3061F NEG MICROALBUMINURIA REV: CPT | Performed by: FAMILY MEDICINE

## 2023-12-16 PROCEDURE — 82570 ASSAY OF URINE CREATININE: CPT

## 2023-12-16 PROCEDURE — 80061 LIPID PANEL: CPT

## 2023-12-16 PROCEDURE — 83036 HEMOGLOBIN GLYCOSYLATED A1C: CPT

## 2023-12-16 PROCEDURE — 80053 COMPREHEN METABOLIC PANEL: CPT

## 2023-12-16 PROCEDURE — 3046F HEMOGLOBIN A1C LEVEL >9.0%: CPT | Performed by: FAMILY MEDICINE

## 2023-12-16 PROCEDURE — 84443 ASSAY THYROID STIM HORMONE: CPT

## 2023-12-16 PROCEDURE — 3060F POS MICROALBUMINURIA REV: CPT | Performed by: FAMILY MEDICINE

## 2023-12-21 ENCOUNTER — OFFICE VISIT (OUTPATIENT)
Dept: FAMILY MEDICINE CLINIC | Facility: CLINIC | Age: 43
End: 2023-12-21
Payer: COMMERCIAL

## 2023-12-21 VITALS
SYSTOLIC BLOOD PRESSURE: 120 MMHG | WEIGHT: 180 LBS | HEIGHT: 62 IN | BODY MASS INDEX: 33.13 KG/M2 | DIASTOLIC BLOOD PRESSURE: 70 MMHG | HEART RATE: 65 BPM

## 2023-12-21 DIAGNOSIS — F32.89 OTHER DEPRESSION: ICD-10-CM

## 2023-12-21 DIAGNOSIS — Z79.4 TYPE 2 DIABETES MELLITUS WITHOUT COMPLICATION, WITH LONG-TERM CURRENT USE OF INSULIN (HCC): ICD-10-CM

## 2023-12-21 DIAGNOSIS — Z00.00 ROUTINE PHYSICAL EXAMINATION: Primary | ICD-10-CM

## 2023-12-21 DIAGNOSIS — I10 HYPERTENSION, UNSPECIFIED TYPE: ICD-10-CM

## 2023-12-21 DIAGNOSIS — Z12.31 SCREENING MAMMOGRAM, ENCOUNTER FOR: ICD-10-CM

## 2023-12-21 DIAGNOSIS — E11.9 TYPE 2 DIABETES MELLITUS WITHOUT COMPLICATION, WITH LONG-TERM CURRENT USE OF INSULIN (HCC): ICD-10-CM

## 2023-12-21 PROCEDURE — 90471 IMMUNIZATION ADMIN: CPT | Performed by: FAMILY MEDICINE

## 2023-12-21 PROCEDURE — 3078F DIAST BP <80 MM HG: CPT | Performed by: FAMILY MEDICINE

## 2023-12-21 PROCEDURE — 99213 OFFICE O/P EST LOW 20 MIN: CPT | Performed by: FAMILY MEDICINE

## 2023-12-21 PROCEDURE — 99396 PREV VISIT EST AGE 40-64: CPT | Performed by: FAMILY MEDICINE

## 2023-12-21 PROCEDURE — 90677 PCV20 VACCINE IM: CPT | Performed by: FAMILY MEDICINE

## 2023-12-21 PROCEDURE — 3074F SYST BP LT 130 MM HG: CPT | Performed by: FAMILY MEDICINE

## 2023-12-21 PROCEDURE — 3008F BODY MASS INDEX DOCD: CPT | Performed by: FAMILY MEDICINE

## 2023-12-21 RX ORDER — INSULIN DETEMIR 100 [IU]/ML
INJECTION, SOLUTION SUBCUTANEOUS
Qty: 10 EACH | Refills: 1 | Status: SHIPPED | OUTPATIENT
Start: 2023-12-21

## 2023-12-21 RX ORDER — BLOOD SUGAR DIAGNOSTIC
STRIP MISCELLANEOUS
Qty: 200 STRIP | Refills: 11 | Status: SHIPPED | OUTPATIENT
Start: 2023-12-21

## 2023-12-21 RX ORDER — AMOXICILLIN 500 MG/1
500 CAPSULE ORAL 3 TIMES DAILY
Qty: 30 CAPSULE | Refills: 0 | Status: SHIPPED | OUTPATIENT
Start: 2023-12-21 | End: 2023-12-21

## 2023-12-21 RX ORDER — FLUOXETINE HYDROCHLORIDE 40 MG/1
40 CAPSULE ORAL DAILY
Qty: 30 CAPSULE | Refills: 1 | Status: SHIPPED | OUTPATIENT
Start: 2023-12-21

## 2023-12-21 RX ORDER — LANCETS
EACH MISCELLANEOUS
Qty: 100 EACH | Refills: 1 | Status: SHIPPED | OUTPATIENT
Start: 2023-12-21

## 2023-12-21 RX ORDER — AMOXICILLIN AND CLAVULANATE POTASSIUM 875; 125 MG/1; MG/1
1 TABLET, FILM COATED ORAL 2 TIMES DAILY
Qty: 20 TABLET | Refills: 0 | Status: SHIPPED | OUTPATIENT
Start: 2023-12-21 | End: 2023-12-31

## 2023-12-22 ENCOUNTER — TELEPHONE (OUTPATIENT)
Dept: FAMILY MEDICINE CLINIC | Facility: CLINIC | Age: 43
End: 2023-12-22

## 2023-12-22 RX ORDER — PERPHENAZINE 16 MG/1
TABLET, FILM COATED ORAL
Qty: 400 EACH | Refills: 3 | Status: SHIPPED | OUTPATIENT
Start: 2023-12-22

## 2023-12-22 NOTE — TELEPHONE ENCOUNTER
Glucose Blood (ONETOUCH VERIO) In Vitro Strip, Check sugar 4 times daily, Disp: 200 strip, Rfl: 11        Pharmacy comments: missing;illegible information on rx further clarification contour next is the preferred brand as per insurance can we please get a new rx.

## 2023-12-27 NOTE — TELEPHONE ENCOUNTER
metoprolol succinate ER 50 MG Oral Tablet 24 Hr, Take 1 tablet (50 mg total) by mouth daily. PATIENT NEEDS FASTING BLOOD TEST FOLLOWED BY APPOINTMENT., Disp: 30 tablet, Rfl: 0

## 2023-12-28 RX ORDER — METOPROLOL SUCCINATE 50 MG/1
50 TABLET, EXTENDED RELEASE ORAL DAILY
Qty: 90 TABLET | Refills: 0 | Status: SHIPPED | OUTPATIENT
Start: 2023-12-28 | End: 2024-04-01

## 2024-01-12 ENCOUNTER — TELEPHONE (OUTPATIENT)
Dept: FAMILY MEDICINE CLINIC | Facility: CLINIC | Age: 44
End: 2024-01-12

## 2024-01-12 NOTE — TELEPHONE ENCOUNTER
FLUoxetine HCl 40 MG Oral Cap, Take 1 capsule (40 mg total) by mouth daily., Disp: 30 capsule, Rfl: 1

## 2024-01-16 ENCOUNTER — HOSPITAL ENCOUNTER (OUTPATIENT)
Dept: MAMMOGRAPHY | Facility: HOSPITAL | Age: 44
Discharge: HOME | End: 2024-01-16
Attending: FAMILY MEDICINE
Payer: COMMERCIAL

## 2024-01-16 DIAGNOSIS — Z12.31 SCREENING MAMMOGRAM, ENCOUNTER FOR: ICD-10-CM

## 2024-01-16 PROCEDURE — 77067 SCR MAMMO BI INCL CAD: CPT | Performed by: FAMILY MEDICINE

## 2024-01-16 PROCEDURE — 77063 BREAST TOMOSYNTHESIS BI: CPT | Performed by: FAMILY MEDICINE

## 2024-01-26 RX ORDER — BLOOD SUGAR DIAGNOSTIC
STRIP MISCELLANEOUS
Qty: 200 STRIP | Refills: 11 | OUTPATIENT
Start: 2024-01-26

## 2024-01-26 NOTE — TELEPHONE ENCOUNTER
Refill requested too soon:     Disp Refills Start     Glucose Blood (ONETOUCH VERIO) In Vitro Strip 200 strip 11 12/21/2023 --    Sig: Check sugar 4 times daily    Sent to pharmacy as: OneTouch Verio In Vitro Strip    E-Prescribing Status: Receipt confirmed by pharmacy (12/21/2023  1:26 PM CST)    Pharmacy  [facility] [id number] [facility] [employer] - [facility] [place] [place] - [address] [phone], [phone]

## 2024-01-27 DIAGNOSIS — O24.111 PRE-EXISTING TYPE 2 DIABETES MELLITUS, IN PREGNANCY, FIRST TRIMESTER: ICD-10-CM

## 2024-01-29 RX ORDER — BLOOD SUGAR DIAGNOSTIC
1 STRIP MISCELLANEOUS 4 TIMES DAILY
Qty: 400 STRIP | Refills: 3 | Status: SHIPPED | OUTPATIENT
Start: 2024-01-29

## 2024-01-29 RX ORDER — LANCETS
EACH MISCELLANEOUS
Qty: 400 EACH | Refills: 3 | Status: SHIPPED | OUTPATIENT
Start: 2024-01-29

## 2024-01-29 NOTE — TELEPHONE ENCOUNTER
Refill passed per [facility] protocol.    Requested Prescriptions   Pending Prescriptions Disp Refills    Glucose Blood (ONETOUCH VERIO) In Vitro Strip [Pharmacy Med Name: ONE TOUCH VERIO TEST STRIP] 400 strip 3     Si strip by In Vitro route 4 (four) times daily. TEST IN THE MORNING AND 2 HOURS AFTER EACH MEAL.       Diabetic Supplies Protocol Passed - 2024 11:57 AM        Passed - In person appointment or virtual visit in the past 12 mos or appointment in next 3 mos     Recent Outpatient Visits              2 weeks ago Panic anxiety syndrome    [facility], [address], [place] Govind Beard, DO    Office Visit    1 month ago Routine physical examination    [facility], [address], [place] Govind Beard, DO    Office Visit    7 months ago [facility]-19    [facility]    [facility]    1 year ago Type 2 diabetes mellitus without complication, with long-term current use of insulin ([facility])    [facility], [address], [place] Govind Beard, DO    Office Visit    1 year ago Hyperglycemia    [facility], [address], [place] Govind Beard, DO    Office Visit          Future Appointments         Provider Department Appt Notes    In 2 weeks Govind Beard, DO [facility], [address], [place] 4 WkFollow Up                 OneTouch UltraSoft Lancets Does not apply Misc 100 each 1     Sig: Check sugar 4 times daily       Diabetic Supplies Protocol Passed - 2024 11:57 AM        Passed - In person appointment or virtual visit in the past 12 mos or appointment in next 3 mos     Recent Outpatient Visits              2 weeks ago Panic anxiety syndrome    [facility], [address], [place] Govind Beard, DO    Office Visit    1 month ago Routine physical examination    [facility], [address], [place]  Govind Beard, DO    Office Visit    7 months ago [address] L[address] MD    Telemedicine    1 year ago Type 2 diabetes mellitus without complication, with long-term current use of insulin ([facility])    [facility], [address], [place] Govind Beard, DO    Office Visit    1 year ago Hyperglycemia    [facility]    Office Visit          Future Appointments         Provider Department Appt Notes    In 2 weeks Govind Beard, DO [facility], [address], [place] 4 WkFollow Up                  Future Appointments         Provider Department Appt Notes    In 2 weeks Govind Beard, DO [facility], [address], [place] 4 WkFollow Up          Recent Outpatient Visits              2 weeks ago Panic anxiety syndrome    [facility], [address], [place] Govind Beard, DO    Office Visit    1 month ago Routine physical examination    [facility]    Office Visit    7 months ago [address] MD    Telemedicine    1 year ago Type 2 diabetes mellitus without complication, with long-term current use of insulin ([facility])    [facility]    Office Visit    1 year ago Hyperglycemia    [facility], [address], [place] Govind Beard, DO    Office Visit

## 2024-01-30 ENCOUNTER — HOSPITAL ENCOUNTER (OUTPATIENT)
Age: 44
Discharge: HOME | End: 2024-01-30
Payer: COMMERCIAL

## 2024-01-30 VITALS
OXYGEN SATURATION: 98 % | DIASTOLIC BLOOD PRESSURE: 83 MMHG | SYSTOLIC BLOOD PRESSURE: 138 MMHG | TEMPERATURE: 98 F | RESPIRATION RATE: 18 BRPM | HEART RATE: 96 BPM

## 2024-01-30 DIAGNOSIS — J02.9 VIRAL PHARYNGITIS: ICD-10-CM

## 2024-01-30 DIAGNOSIS — J21.9 ACUTE BRONCHIOLITIS DUE TO UNSPECIFIED ORGANISM: Primary | ICD-10-CM

## 2024-01-30 LAB
POCT INFLUENZA A: NEGATIVE
POCT INFLUENZA B: NEGATIVE
S PYO AG THROAT QL: NEGATIVE

## 2024-01-30 PROCEDURE — 99213 OFFICE O/P EST LOW 20 MIN: CPT | Performed by: PHYSICIAN ASSISTANT

## 2024-01-30 PROCEDURE — 87880 STREP A ASSAY W/OPTIC: CPT | Performed by: PHYSICIAN ASSISTANT

## 2024-01-30 PROCEDURE — 87502 INFLUENZA DNA AMP PROBE: CPT | Performed by: PHYSICIAN ASSISTANT

## 2024-01-30 RX ORDER — BENZOCAINE AND MENTHOL, UNSPECIFIED FORM 15; 2.3 MG/1; MG/1
1 LOZENGE ORAL 3 TIMES DAILY PRN
Qty: 16 LOZENGE | Refills: 0 | Status: SHIPPED | OUTPATIENT
Start: 2024-01-30

## 2024-01-30 RX ORDER — ECHINACEA PURPUREA EXTRACT 125 MG
1 TABLET ORAL EVERY 4 HOURS PRN
Qty: 30 ML | Refills: 0 | Status: SHIPPED | OUTPATIENT
Start: 2024-01-30

## 2024-01-30 NOTE — ED PROVIDER NOTES
Chief Complaint   Patient presents with    Sore Throat    Nasal Congestion       HPI:     April R Dennis is a 43 year old female who presents for evaluation of headache and sore throat onset yesterday evening, took dual action Advil Tylenol this morning with mild improvement in symptoms, max pain, 4 out of 10.  Sick contacts include son pending medical evaluation.  Denies direct contact to strep influenza or coronavirus.  Notes home swabs x 2 negative for self.  Denies associated dizziness ear pain dysphagia neck pain chest pain shortness of breath abdominal pain vomiting diarrhea dysuria or rash.  Notes blood sugar stable this morning at home.      PFSH    PFSH asessment screens reviewed and agree.  Nurses notes reviewed I agree with documentation.    Family History   Problem Relation Age of Onset    Diabetes Mother     Hypertension Mother     Allergies Mother     Glaucoma Father     Breast Cancer Paternal Aunt 60        EST    Ovarian Cancer Paternal Aunt 60        EST    Diabetes Paternal Aunt         Per NG:Famly history     Colon Cancer Paternal Aunt         close relative     Family history reviewed with patient/caregiver and is not pertinent to presenting problem.  Social History     Socioeconomic History    Marital status: Single     Spouse name: Not on file    Number of children: Not on file    Years of education: Not on file    Highest education level: Not on file   Occupational History    Not on file   Tobacco Use    Smoking status: Former     Types: Cigarettes    Smokeless tobacco: Never    Tobacco comments:     smoked as a teenager   Vaping Use    Vaping Use: Never used   Substance and Sexual Activity    Alcohol use: Not Currently     Alcohol/week: 0.8 standard drinks of alcohol     Types: 1 Glasses of wine per week     Comment: Rarely    Drug use: No    Sexual activity: Not on file   Other Topics Concern     Service Not Asked    Blood Transfusions Not Asked    Caffeine Concern No     Occupational Exposure Not Asked    Hobby Hazards Not Asked    Sleep Concern Not Asked    Stress Concern Not Asked    Weight Concern Not Asked    Special Diet Not Asked    Back Care Not Asked    Exercise Not Asked    Bike Helmet Not Asked    Seat Belt Not Asked    Self-Exams Not Asked   Social History Narrative    Not on file     Social Determinants of Health     Financial Resource Strain: Not on file   Food Insecurity: Not on file   Transportation Needs: Not on file   Physical Activity: Not on file   Stress: Not on file   Social Connections: Not on file   Housing Stability: Not on file         ROS:   Positive for stated complaint: Sore throat, nasal congestion, cough, headache.  All other systems reviewed and negative except as noted above.  Constitutional and Vital Signs Reviewed.      Physical Exam:     Findings:    /83   Pulse 96   Temp 97.8 °F (36.6 °C) (Temporal)   Resp 18   LMP 01/31/2023   SpO2 98%   GENERAL: well developed, well nourished, well hydrated, no distress  SKIN: good skin turgor, no obvious rashes  NECK: supple, no adenopathy  EXTREMITIES: no cyanosis or edema. SINGH without difficulty  GI: soft, non-tender, normal bowel sounds  HEAD: normocephalic, atraumatic  EYES: sclera non icteric bilateral, conjunctiva clear  EARS: TMs clear bilaterally. Canals clear.  NOSE:  rhinorrhea.  MMM.  Nasal turbinates: pink, normal mucosa  THROAT: Erythema posterior pharynx, nonenlarged tonsils.  Without exudates, uvula midline, and airway patent  LUNGS: No retractions.  Clear to auscultation bilaterally; no rales, rhonchi, or wheezes  NEURO: No focal deficits  PSYCH: Alert and oriented x3.  Answering questions appropriately.  Mood appropriate.    MDM/Assessment/Plan:   Orders for this encounter:    Orders Placed This Encounter    POCT Rapid Strep    POCT Flu Test     Order Specific Question:   Release to patient     Answer:   Immediate    POCT Rapid Strep    Benzocaine-Menthol (CEPACOL) 15-2.3 MG  Mouth/Throat Lozenge     Sig: Use as directed 1 lozenge in the mouth or throat 3 (three) times daily as needed.     Dispense:  16 lozenge     Refill:  0       Labs performed this visit:  Recent Results (from the past 10 hour(s))   POCT Flu Test    Collection Time: 01/30/24 12:04 PM    Specimen: Nares; Other   Result Value Ref Range    POCT INFLUENZA A Negative Negative    POCT INFLUENZA B Negative Negative   POCT Rapid Strep    Collection Time: 01/30/24 12:16 PM   Result Value Ref Range    POCT Rapid Strep Negative Negative       MDM:  Swabs negative.  Agrees with supportive viral management.  Agrees no formal radiographs based on examination and history yet reevaluation for lingering symptoms or going to the ER for breakthrough changes.  Alert nontoxic.  Agrees with continuation of glycemic management and close monitoring.    Diagnosis:    ICD-10-CM    1. Acute bronchiolitis due to unspecified organism  J21.9       2. Viral pharyngitis  J02.9           All results reviewed and discussed with patient.  See AVS for detailed discharge instructions for your condition today.    Follow Up with:  Govind Beard, DO  [address] [place] [address] [phone]    Schedule an appointment as soon as possible for a visit in 3 days  As needed, If symptoms worsen

## 2024-01-30 NOTE — DISCHARGE INSTRUCTIONS
CONTINUE FLONASE as PRESCRIBED WITH ADDITIONAL OF SALINE RINSE FOR FURTHER DAILY SUPPORT OF CONGESTION.

## 2024-03-06 DIAGNOSIS — Z79.4 TYPE 2 DIABETES MELLITUS WITHOUT COMPLICATION, WITH LONG-TERM CURRENT USE OF INSULIN (HCC): ICD-10-CM

## 2024-03-06 DIAGNOSIS — E11.9 TYPE 2 DIABETES MELLITUS WITHOUT COMPLICATION, WITH LONG-TERM CURRENT USE OF INSULIN (HCC): ICD-10-CM

## 2024-03-06 RX ORDER — SEMAGLUTIDE 0.68 MG/ML
INJECTION, SOLUTION SUBCUTANEOUS
Qty: 1 EACH | Refills: 12 | Status: CANCELLED | OUTPATIENT
Start: 2024-03-06 | End: 2024-05-29

## 2024-03-07 NOTE — TELEPHONE ENCOUNTER
Patient is calling and checking the status on this. Per patient she is due for her injection today but she doesn't have the correct \"level\" that she is supposed to take as she has completed the first two months. She said she has 0.5 pens left still.

## 2024-03-07 NOTE — TELEPHONE ENCOUNTER
Pharmacy needs new script for the 1mg once a week for 28 days.     Pended script for the 3rd month     Medication Quantity Refills Start End   semaglutide (OZEMPIC, 0.25 OR 0.5 MG/DOSE,) 2 MG/3ML Subcutaneous Solution Pen-injector 1 each 12 1/11/2024 4/4/2024   Sig:   Inject 0.25 mg into the skin once a week for 28 days, THEN 0.5 mg once a week for 28 days, THEN 1 mg once a week for 28 days.

## 2024-03-07 NOTE — TELEPHONE ENCOUNTER
Please review; protocol failed/No Protocol    Patient Comment: Pharmacy said they need a 1ml dose pen. They did say they sent you a message but asked me to do the same. Tomorrow is my next dose since I started 1/11/24     Pended script for the higher dose-please review if appropriate?    Requested Prescriptions   Pending Prescriptions Disp Refills    semaglutide 4 MG/3ML Subcutaneous Solution Pen-injector 1 each 0     Sig: Inject 1 mg into the skin once a week.       Diabetes Medication Protocol Failed - 3/7/2024  3:08 PM        Failed - Last A1C < 7.5 and within past 6 months     Lab Results   Component Value Date    A1C 9.4 (H) 12/16/2023             Passed - In person appointment or virtual visit in the past 6 mos or appointment in next 3 mos     Recent Outpatient Visits              3 weeks ago Panic anxiety syndrome    [facility] [place] [place]    Office Visit    1 month ago Panic anxiety syndrome    [facility], [place] Cespedes, David B, DO    Office Visit    2 months ago Routine physical examination    [facility] [place] [place]    Office Visit    8 months ago COVID-19    [facility] [place] Lokesh Salas MD    Telemedicine    1 year ago Type 2 diabetes mellitus without complication, with long-term current use of insulin ([facility])    [facility]    [facility]          Future Appointments         Provider Department Appt Notes    In 2 weeks Govind Beard,  [facility], [address], [place] 4 WkFollow Up               Passed - Microalbumin procedure in past 12 months or taking ACE/ARB        Passed - EGFRCR or GFRAA > 50     GFR Evaluation  EGFRCR: 96 , resulted on 12/16/2023          Passed - GFR in the past 12 months           Future Appointments         Provider  Department Appt Notes    In 2 weeks Govind Beard, DO [facility], [address], [place] 4 WkFollow Up          Recent Outpatient Visits              3 weeks ago [facility]     [facility]    1 month ago Panic anxiety syndrome    [facility], [address], [place] Govind Beard, DO    Office Visit    2 months ago Routine physical examination    [facility]    Office Visit    8 months ago COVID-19    [facility] [place] Lokesh Salas MD    Telemedicine    1 year ago Type 2 diabetes mellitus without complication, with long-term current use of insulin (HCC)    [facility], [address], [place] Govind Beard, DO    Office Visit

## 2024-03-19 RX ORDER — AMLODIPINE BESYLATE 5 MG/1
5 TABLET ORAL DAILY
Qty: 90 TABLET | Refills: 3 | Status: SHIPPED | OUTPATIENT
Start: 2024-03-19

## 2024-03-19 NOTE — TELEPHONE ENCOUNTER
Refill passed per [facility] protocol.  Requested Prescriptions   Pending Prescriptions Disp Refills    AMLODIPINE 5 MG Oral Tab [Pharmacy Med Name: AMLODIPINE BESYLATE 5 MG TAB] 90 tablet 0     Sig: Take 1 tablet (5 mg total) by mouth daily. PATIENT NEEDS FASTING BLOOD TEST FOLLOWED BY APPOINTMENT.       Hypertension Medications Protocol Passed - 3/17/2024  7:26 AM        Passed - CMP or BMP in past 12 months        Passed - Last BP reading less than 140/90     BP Readings from Last 1 Encounters:   02/13/24 114/75               Passed - In person appointment or virtual visit in the past 12 mos or appointment in next 3 mos     Recent Outpatient Visits              1 month ago [facility] anxiety [facility]    [facility]    2 months ago Panic anxiety syndrome    [facility], [place] Cespedes, David B, DO    Office Visit    2 months ago Routine physical examination    [facility] [place] [place]    Office Visit    9 months ago [facility]    [facility]    1 year ago Type 2 diabetes mellitus without complication, with long-term current use of insulin ([facility])    [facility]    [facility]          Future Appointments         Provider Department Appt Notes    In 2 days Govind Beard, DO [facility], [address], [place] 4 WkFollow Up               Passed - EGFRCR or GFRAA > 50     GFR Evaluation  EGFRCR: 96 , resulted on 12/16/2023             Recent Outpatient Visits              1 month ago [facility]    [facility] Visit    2 months ago Panic anxiety syndrome    [facility], [address], [place]  Govind BUNDY, DO    Office Visit    2 months ago Routine physical examination    [facility], [address], [place] Govind Beard,     Office Visit    9 months ago COVID-19    [facility] [place] [facility]    Telemedicine    1 year ago Type 2 diabetes mellitus without complication, with long-term current use of insulin (HCC)    [facility], [address], [place] Govind Beard, DO    Office Visit          Future Appointments         Provider Department Appt Notes    In 2 days Govind Beard, DO [facility], [address], [place] 4 WkFollow Up

## 2024-04-01 RX ORDER — SEMAGLUTIDE 1.34 MG/ML
1 INJECTION, SOLUTION SUBCUTANEOUS WEEKLY
Qty: 12 EACH | Refills: 3 | Status: SHIPPED | OUTPATIENT
Start: 2024-04-01

## 2024-04-01 RX ORDER — METOPROLOL SUCCINATE 50 MG/1
50 TABLET, EXTENDED RELEASE ORAL DAILY
Qty: 90 TABLET | Refills: 2 | Status: SHIPPED | OUTPATIENT
Start: 2024-04-01

## 2024-04-01 NOTE — TELEPHONE ENCOUNTER
Refill passed per [facility] protocols.    Requested Prescriptions   Pending Prescriptions Disp Refills    OZEMPIC, 1 MG/DOSE, 4 MG/3ML Subcutaneous Solution Pen-injector [Pharmacy Med Name: OZEMPIC 4 MG/3 ML (1 MG/DOSE)]  0     Sig: INJECT 1MG INTO THE SKIN ONCE A WEEK       Diabetes Medication Protocol Failed - 3/28/2024 11:43 PM        Failed - Last A1C < 7.5 and within past 6 months     Lab Results   Component Value Date    A1C 9.4 (H) 12/16/2023             Passed - In person appointment or virtual visit in the past 6 mos or appointment in next 3 mos     Recent Outpatient Visits              [facility]    [facility]    1 month ago Panic anxiety syndrome    [facility]    [facility] Visit    2 months ago Panic anxiety syndrome    [facility], [address], [place] Govind Beard, DO    Office Visit    3 months ago Routine physical examination    [facility]    Office Visit    9 months ago COVID-19    [facility] Lokesh Salas MD    Telemedicine          Future Appointments         Provider Department Appt Notes    In 2 months Govind Beard, DO [facility], [address], [place] 3 month Follow Up               Passed - Microalbumin procedure in past 12 months or taking ACE/ARB        Passed - EGFRCR or GFRAA > 50     GFR Evaluation  EGFRCR: 96 , resulted on 12/16/2023          Passed - GFR in the past 12 months          METOPROLOL SUCCINATE ER 50 MG Oral Tablet 24 Hr [Pharmacy Med Name: METOPROLOL SUCC ER 50 MG TAB] 90 tablet 0     Sig: TAKE 1 TABLET BY MOUTH EVERY DAY       Hypertension Medications Protocol Passed - 3/28/2024 11:43 PM        Passed - CMP or BMP in past 12 months        Passed - Last BP  reading less than 140/90     BP Readings from Last 1 Encounters:   03/21/24 110/76               Passed - In person appointment or virtual visit in the past 12 mos or appointment in next 3 mos     Recent Outpatient Visits              1 week ago Panic anxiety syndrome    [facility], [facility], [place] Cespedes, David B, DO    Office Visit    1 month ago Panic anxiety syndrome    [facility], [place] Cespedes, David B, DO    Office Visit    2 months ago Panic anxiety syndrome    [facility], [facility], [place] Cespedes, David B, DO    Office Visit    3 months ago [facility]ine physical examination    [facility]    [facility]    9 months ago COVID-19    [facility] Lokesh Salas MD    Telemedicine          Future Appointments         Provider Department Appt Notes    In 2 months Govind Beard, DO [facility], [address], [place] 3 month Follow Up               Passed - EGFRCR or GFRAA > 50     GFR Evaluation  EGFRCR: 96 , resulted on 12/16/2023

## 2024-04-01 NOTE — TELEPHONE ENCOUNTER
Please review.  Protocol failed / Has no protocol.     Requested Prescriptions   Pending Prescriptions Disp Refills    semaglutide (OZEMPIC, 1 MG/DOSE,) 4 MG/3ML Subcutaneous Solution Pen-injector [Pharmacy Med Name: OZEMPIC 4 MG/3 ML (1 MG/DOSE)]  0     Sig: Inject 1 mg into the skin.       Diabetes Medication Protocol Failed - 3/28/2024 11:43 PM        Failed - Last A1C < 7.5 and within past 6 months     Lab Results   Component Value Date    A1C 9.4 (H) 12/16/2023             Passed - In person appointment or virtual visit in the past 6 mos or appointment in next 3 mos     Recent Outpatient Visits              1 week ago Panic anxiety syndrome    [facility], [address], [place] Govind Beard, DO    Office Visit    1 month ago Panic anxiety syndrome    [facility] [place] [place] Ces[place]es, David B, DO    Office Visit    2 months ago Panic anxiety syndrome    [facility], [address], [place] Govind Beard, DO    Office Visit    3 months ago Routine physical examination    [facility], [address], [place] Govind Beard, DO    Office Visit    9 months ago COVID-19    [facility] Lokesh Salas MD    Telemedicine          Future Appointments         Provider Department Appt Notes    In 2 months Govind Beard,  [facility] [place] [address], [place] 3 month Follow Up               Passed - Microalbumin procedure in past 12 months or taking ACE/ARB        Passed - EGFRCR or GFRAA > 50     GFR Evaluation  EGFRCR: 96 , resulted on 12/16/2023          Passed - GFR in the past 12 months         Signed Prescriptions Disp Refills    metoprolol succinate ER 50 MG Oral Tablet 24 Hr 90 tablet 2     Sig: Take 1 tablet (50 mg total) by mouth daily.       Hypertension Medications Protocol Passed - 3/28/2024 11:43 PM        Passed - CMP or BMP in past 12 months        Passed -  Last BP reading less than 140/90     BP Readings from Last 1 Encounters:   03/21/24 110/76               Passed - In person appointment or virtual visit in the past 12 mos or appointment in next 3 mos     Recent Outpatient Visits              1 week ago Panic anxiety syndrome    [place] [place]    Office Visit    1 month ago Panic anxiety syndrome    [facility], [place] Cespedes, David B, DO    Office Visit    2 months ago Panic anxiety syndrome    [place]    Office Visit    3 months ago Routine physical examination    [facility]    [facility] Visit    9 months ago COVID-19    [facility] Lokesh Salas MD    Telemedicine          Future Appointments         Provider Department Appt Notes    In 2 months Govind Beard, DO [facility], [address], [place] 3 month Follow Up               Passed - EGFRCR or GFRAA > 50     GFR Evaluation  EGFRCR: 96 , resulted on 12/16/2023

## 2024-04-03 ENCOUNTER — TELEPHONE (OUTPATIENT)
Dept: FAMILY MEDICINE CLINIC | Facility: CLINIC | Age: 44
End: 2024-04-03

## 2024-04-03 NOTE — TELEPHONE ENCOUNTER
semaglutide (OZEMPIC, 1 MG/DOSE,) 4 MG/3ML Subcutaneous Solution Pen-injector, Inject 1 mg into the skin once a week., Disp: 12 each, Rfl: 3      Key:[id number]  Patients Last Name: Dennis  : 10/24/1980

## 2024-04-11 RX ORDER — ESCITALOPRAM OXALATE 10 MG/1
10 TABLET ORAL DAILY
Qty: 30 TABLET | Refills: 2 | Status: SHIPPED | OUTPATIENT
Start: 2024-04-11

## 2024-04-11 NOTE — TELEPHONE ENCOUNTER
Dr. Beard - as advised, has 3 month follow-up scheduled. For 6/20/24    Patient asking for higher dose of escitalopram - supposedly discussed at last visit, but no notes. Please advise on increased dose.         escitalopram 10 MG Oral Tab          Sig: Take 1 tablet (10 mg total) by mouth daily.    Disp: 30 tablet    Refills: 2    Start: 4/10/2024    Class: Normal    Non-formulary    Last ordered: 1 month ago (2/13/2024) by Govind Beard, DO    Patient comment: Can you change this to the higher dose we discuss at my last visit.    Psychiatric Non-Scheduled (Anti-Anxiety) Vsxwcs13/10/2024 01:44 PM   Protocol Details In person appointment or virtual visit in the past 6 mos or appointment in next 3 mos    Depression Screening completed within the past 12 months      To be filled at: [facility] [id number] [place] [facility], [place] [address] [address] [phone], [phone]

## 2024-06-19 ENCOUNTER — LAB ENCOUNTER (OUTPATIENT)
Dept: LAB | Age: 44
End: 2024-06-19
Attending: FAMILY MEDICINE

## 2024-06-19 DIAGNOSIS — E11.9 TYPE 2 DIABETES MELLITUS WITHOUT COMPLICATION, WITH LONG-TERM CURRENT USE OF INSULIN (HCC): ICD-10-CM

## 2024-06-19 DIAGNOSIS — Z79.4 TYPE 2 DIABETES MELLITUS WITHOUT COMPLICATION, WITH LONG-TERM CURRENT USE OF INSULIN (HCC): ICD-10-CM

## 2024-06-19 LAB
ALBUMIN SERPL-MCNC: 4.2 G/DL (ref 3.2–4.8)
ALBUMIN/GLOB SERPL: 1.4 {RATIO} (ref 1–2)
ALP LIVER SERPL-CCNC: 69 U/L
ALT SERPL-CCNC: 20 U/L
ANION GAP SERPL CALC-SCNC: 6 MMOL/L (ref 0–18)
AST SERPL-CCNC: 19 U/L (ref ?–34)
BILIRUB SERPL-MCNC: 0.6 MG/DL (ref 0.3–1.2)
BUN BLD-MCNC: 14 MG/DL (ref 9–23)
BUN/CREAT SERPL: 15.9 (ref 10–20)
CALCIUM BLD-MCNC: 9.2 MG/DL (ref 8.7–10.4)
CHLORIDE SERPL-SCNC: 108 MMOL/L (ref 98–112)
CHOLEST SERPL-MCNC: 168 MG/DL (ref ?–200)
CO2 SERPL-SCNC: 26 MMOL/L (ref 21–32)
CREAT BLD-MCNC: 0.88 MG/DL
EGFRCR SERPLBLD CKD-EPI 2021: 85 ML/MIN/1.73M2 (ref 60–?)
EST. AVERAGE GLUCOSE BLD GHB EST-MCNC: 134 MG/DL (ref 68–126)
FASTING PATIENT LIPID ANSWER: YES
FASTING STATUS PATIENT QL REPORTED: YES
GLOBULIN PLAS-MCNC: 3 G/DL (ref 2–3.5)
GLUCOSE BLD-MCNC: 129 MG/DL (ref 70–99)
HBA1C MFR BLD: 6.3 % (ref ?–5.7)
HDLC SERPL-MCNC: 38 MG/DL (ref 40–59)
LDLC SERPL CALC-MCNC: 110 MG/DL (ref ?–100)
NONHDLC SERPL-MCNC: 130 MG/DL (ref ?–130)
OSMOLALITY SERPL CALC.SUM OF ELEC: 292 MOSM/KG (ref 275–295)
POTASSIUM SERPL-SCNC: 4.4 MMOL/L (ref 3.5–5.1)
PROT SERPL-MCNC: 7.2 G/DL (ref 5.7–8.2)
SODIUM SERPL-SCNC: 140 MMOL/L (ref 136–145)
TRIGL SERPL-MCNC: 112 MG/DL (ref 30–149)
VLDLC SERPL CALC-MCNC: 19 MG/DL (ref 0–30)

## 2024-06-19 PROCEDURE — 80053 COMPREHEN METABOLIC PANEL: CPT

## 2024-06-19 PROCEDURE — 80061 LIPID PANEL: CPT

## 2024-06-19 PROCEDURE — 83036 HEMOGLOBIN GLYCOSYLATED A1C: CPT

## 2024-06-19 PROCEDURE — 36415 COLL VENOUS BLD VENIPUNCTURE: CPT

## 2024-06-20 ENCOUNTER — OFFICE VISIT (OUTPATIENT)
Dept: FAMILY MEDICINE CLINIC | Facility: CLINIC | Age: 44
End: 2024-06-20

## 2024-06-20 VITALS
OXYGEN SATURATION: 100 % | HEIGHT: 62 IN | BODY MASS INDEX: 31.28 KG/M2 | WEIGHT: 170 LBS | SYSTOLIC BLOOD PRESSURE: 110 MMHG | HEART RATE: 82 BPM | DIASTOLIC BLOOD PRESSURE: 80 MMHG

## 2024-06-20 DIAGNOSIS — I10 HYPERTENSION, UNSPECIFIED TYPE: ICD-10-CM

## 2024-06-20 DIAGNOSIS — Z79.4 TYPE 2 DIABETES MELLITUS WITHOUT COMPLICATION, WITH LONG-TERM CURRENT USE OF INSULIN (HCC): Primary | ICD-10-CM

## 2024-06-20 DIAGNOSIS — F41.0 PANIC ANXIETY SYNDROME: ICD-10-CM

## 2024-06-20 DIAGNOSIS — E11.9 TYPE 2 DIABETES MELLITUS WITHOUT COMPLICATION, WITH LONG-TERM CURRENT USE OF INSULIN (HCC): Primary | ICD-10-CM

## 2024-06-20 PROCEDURE — 99213 OFFICE O/P EST LOW 20 MIN: CPT | Performed by: FAMILY MEDICINE

## 2024-06-20 RX ORDER — ESCITALOPRAM OXALATE 20 MG/1
20 TABLET ORAL DAILY
Qty: 90 TABLET | Refills: 3 | Status: SHIPPED | OUTPATIENT
Start: 2024-06-20

## 2024-06-20 RX ORDER — AMLODIPINE BESYLATE 5 MG/1
5 TABLET ORAL DAILY
COMMUNITY
Start: 2024-06-20

## 2024-06-20 NOTE — PROGRESS NOTES
Blood pressure 110/80, pulse 82, height 5' 2\" (1.575 m), weight 170 lb (77.1 kg), SpO2 100%, not currently breastfeeding.  Following up for diabetes.  Had a panic attack a week or 2 ago at work as a cause of her stress NO  suicidality.  She is productive at work.  Sleeping well.  Losing weight deliberately.    Does not use an asthma inhaler for over 2 years.    Objective    Appropriate mood and affect    Assessment #1 type 2 diabetes #2 anxiety panic #3 asthma    Plan #1 continue present medications will not add ARB ACE or statin yet she is not sure if she is done having children #2 continue Lexapro at 20 mg dose #3 albuterol as needed    Follow-up in 6 months blood test prior

## 2024-09-27 ENCOUNTER — HOSPITAL ENCOUNTER (OUTPATIENT)
Dept: GENERAL RADIOLOGY | Facility: HOSPITAL | Age: 44
Discharge: HOME | End: 2024-09-27
Attending: FAMILY MEDICINE
Payer: COMMERCIAL

## 2024-09-27 ENCOUNTER — TELEMEDICINE (OUTPATIENT)
Dept: FAMILY MEDICINE CLINIC | Facility: CLINIC | Age: 44
End: 2024-09-27
Payer: COMMERCIAL

## 2024-09-27 ENCOUNTER — LAB ENCOUNTER (OUTPATIENT)
Dept: LAB | Facility: HOSPITAL | Age: 44
End: 2024-09-27
Attending: FAMILY MEDICINE
Payer: COMMERCIAL

## 2024-09-27 DIAGNOSIS — U07.1 COVID-19: Primary | ICD-10-CM

## 2024-09-27 DIAGNOSIS — U07.1 COVID-19: ICD-10-CM

## 2024-09-27 LAB
D DIMER PPP FEU-MCNC: 0.44 UG/ML FEU (ref ?–0.5)
HCG SERPL QL: NEGATIVE

## 2024-09-27 PROCEDURE — 99214 OFFICE O/P EST MOD 30 MIN: CPT | Performed by: FAMILY MEDICINE

## 2024-09-27 PROCEDURE — 85379 FIBRIN DEGRADATION QUANT: CPT

## 2024-09-27 PROCEDURE — 71046 X-RAY EXAM CHEST 2 VIEWS: CPT | Performed by: FAMILY MEDICINE

## 2024-09-27 PROCEDURE — 36415 COLL VENOUS BLD VENIPUNCTURE: CPT

## 2024-09-27 PROCEDURE — 84703 CHORIONIC GONADOTROPIN ASSAY: CPT

## 2024-09-27 RX ORDER — ALBUTEROL SULFATE 90 UG/1
2 INHALANT RESPIRATORY (INHALATION) EVERY 4 HOURS PRN
Qty: 1 EACH | Refills: 1 | Status: SHIPPED | OUTPATIENT
Start: 2024-09-27

## 2024-09-27 NOTE — PROGRESS NOTES
VIDEO VISIT            Patient with history of asthma and and diabetes tested positive for COVID yesterday symptoms began 2 days ago.  She reports that she has bodyaches with nasal congestion and cough.  She had the chills that woke her up last night.  The cough did not keep her awake last night.  She does have some slight shortness of breath at this time.  She has not used an inhaler yet.    Objective patient is comfortable no apparent distress    Assessment COVID with history of asthma and diabetes    Plan stat D-dimer chest x-ray and qualitative hCG ordered    Patient is to follow-up in the emergency department if breathing worsens at any time.  She is to use her inhaler as soon as possible and go to the emergency room if she does not notice improvement in her breathing.  Discussed in detail dangers of COVID combined with asthma and history of diabetes.  Patient was agreeable.  Patient to go to [facility] for labs and x-ray.    PAXLOVID  and albuterol sent to pharmacy

## 2024-12-18 RX ORDER — METOPROLOL SUCCINATE 50 MG/1
50 TABLET, EXTENDED RELEASE ORAL DAILY
Qty: 90 TABLET | Refills: 2 | Status: SHIPPED | OUTPATIENT
Start: 2024-12-18

## 2024-12-18 NOTE — TELEPHONE ENCOUNTER
Refill passed per [facility] protocol.    Requested Prescriptions   Pending Prescriptions Disp Refills    METOPROLOL SUCCINATE ER 50 MG Oral Tablet 24 Hr [Pharmacy Med Name: METOPROLOL SUCC ER 50 MG TAB] 90 tablet 2     Sig: TAKE 1 TABLET BY MOUTH EVERY DAY       Hypertension Medications Protocol Passed - 12/18/2024  3:05 PM        Passed - CMP or BMP in past 12 months        Passed - Last BP reading less than 140/90     BP Readings from Last 1 Encounters:   06/20/24 110/80               Passed - In person appointment or virtual visit in the past 12 mos or appointment in next 3 mos     Recent Outpatient Visits              2 months ago COVID-19    [facility], [facility], [place] Cespedes, David B, DO    Telemedicine    6 months ago Type 2 diabetes mellitus without complication, with long-term current use of insulin ([facility])    [facility], [facility], [place] Cespedes, David B, DO    Office Visit    9 months ago Panic anxiety syndrome    [facility], [facility], [place] Cespedes, David B, DO    Office Visit    10 months ago Panic anxiety syndrome    [facility], [facility], [place] Cespedes, David B, DO    Office Visit    11 months ago Panic anxiety syndrome    [facility], [facility], [place] Cespedes, David B, DO    Office Visit                      Passed - EGFRCR or GFRAA > 50     GFR Evaluation  EGFRCR: 85 , resulted on 6/19/2024               Recent Outpatient Visits              2 months ago [facility] [place] [address], [facility], [place] Cespedes, David B,     Telemedicine    6 months ago Type 2 diabetes mellitus without complication, with long-term current use of insulin ([facility])    [facility], [facility], [place] Cespedes, David B,     Office Visit    9 months ago Panic anxiety syndrome    [facility], [facility], [place] Cespedes, David B,  DO    Office Visit    10 months ago Panic anxiety syndrome    [facility], [place] Cespedes, David B, DO    Office Visit    11 months ago Panic anxiety syndrome    [facility], [place] Cespedes, David B, DO    Office Visit

## 2025-03-13 ENCOUNTER — LAB ENCOUNTER (OUTPATIENT)
Dept: LAB | Age: 45
End: 2025-03-13
Attending: FAMILY MEDICINE
Payer: MEDICAID

## 2025-03-13 ENCOUNTER — OFFICE VISIT (OUTPATIENT)
Dept: FAMILY MEDICINE CLINIC | Facility: CLINIC | Age: 45
End: 2025-03-13
Payer: MEDICAID

## 2025-03-13 ENCOUNTER — TELEPHONE (OUTPATIENT)
Dept: FAMILY MEDICINE CLINIC | Facility: CLINIC | Age: 45
End: 2025-03-13

## 2025-03-13 VITALS
WEIGHT: 175 LBS | BODY MASS INDEX: 32.2 KG/M2 | DIASTOLIC BLOOD PRESSURE: 80 MMHG | HEART RATE: 78 BPM | HEIGHT: 62 IN | SYSTOLIC BLOOD PRESSURE: 127 MMHG

## 2025-03-13 DIAGNOSIS — I10 HYPERTENSION, UNSPECIFIED TYPE: ICD-10-CM

## 2025-03-13 DIAGNOSIS — Z79.4 TYPE 2 DIABETES MELLITUS WITHOUT COMPLICATION, WITH LONG-TERM CURRENT USE OF INSULIN (HCC): ICD-10-CM

## 2025-03-13 DIAGNOSIS — E11.9 TYPE 2 DIABETES MELLITUS WITHOUT COMPLICATION, WITH LONG-TERM CURRENT USE OF INSULIN (HCC): Primary | ICD-10-CM

## 2025-03-13 DIAGNOSIS — Z12.31 SCREENING MAMMOGRAM, ENCOUNTER FOR: ICD-10-CM

## 2025-03-13 DIAGNOSIS — Z79.4 TYPE 2 DIABETES MELLITUS WITHOUT COMPLICATION, WITH LONG-TERM CURRENT USE OF INSULIN (HCC): Primary | ICD-10-CM

## 2025-03-13 DIAGNOSIS — D22.9 ATYPICAL MOLE: ICD-10-CM

## 2025-03-13 DIAGNOSIS — F41.0 PANIC ANXIETY SYNDROME: ICD-10-CM

## 2025-03-13 DIAGNOSIS — E11.9 TYPE 2 DIABETES MELLITUS WITHOUT COMPLICATION, WITH LONG-TERM CURRENT USE OF INSULIN (HCC): ICD-10-CM

## 2025-03-13 LAB
ALT SERPL-CCNC: 23 U/L
ANION GAP SERPL CALC-SCNC: 4 MMOL/L (ref 0–18)
AST SERPL-CCNC: 21 U/L (ref ?–34)
BUN BLD-MCNC: 11 MG/DL (ref 9–23)
BUN/CREAT SERPL: 14.1 (ref 10–20)
CALCIUM BLD-MCNC: 9.1 MG/DL (ref 8.7–10.4)
CHLORIDE SERPL-SCNC: 105 MMOL/L (ref 98–112)
CHOLEST SERPL-MCNC: 159 MG/DL (ref ?–200)
CO2 SERPL-SCNC: 29 MMOL/L (ref 21–32)
CREAT BLD-MCNC: 0.78 MG/DL
CREAT UR-SCNC: 173.7 MG/DL
EGFRCR SERPLBLD CKD-EPI 2021: 97 ML/MIN/1.73M2 (ref 60–?)
EST. AVERAGE GLUCOSE BLD GHB EST-MCNC: 154 MG/DL (ref 68–126)
FASTING PATIENT LIPID ANSWER: YES
FASTING STATUS PATIENT QL REPORTED: YES
FSH SERPL-ACNC: 8 MIU/ML
GLUCOSE BLD-MCNC: 111 MG/DL (ref 70–99)
HBA1C MFR BLD: 7 % (ref ?–5.7)
HDLC SERPL-MCNC: 46 MG/DL (ref 40–59)
LDLC SERPL CALC-MCNC: 101 MG/DL (ref ?–100)
MICROALBUMIN UR-MCNC: 1.1 MG/DL
MICROALBUMIN/CREAT 24H UR-RTO: 6.3 UG/MG (ref ?–30)
NONHDLC SERPL-MCNC: 113 MG/DL (ref ?–130)
OSMOLALITY SERPL CALC.SUM OF ELEC: 286 MOSM/KG (ref 275–295)
POTASSIUM SERPL-SCNC: 4.1 MMOL/L (ref 3.5–5.1)
SODIUM SERPL-SCNC: 138 MMOL/L (ref 136–145)
TRIGL SERPL-MCNC: 58 MG/DL (ref 30–149)
VLDLC SERPL CALC-MCNC: 10 MG/DL (ref 0–30)

## 2025-03-13 PROCEDURE — 99213 OFFICE O/P EST LOW 20 MIN: CPT | Performed by: FAMILY MEDICINE

## 2025-03-13 PROCEDURE — 84460 ALANINE AMINO (ALT) (SGPT): CPT

## 2025-03-13 PROCEDURE — 82043 UR ALBUMIN QUANTITATIVE: CPT

## 2025-03-13 PROCEDURE — 83036 HEMOGLOBIN GLYCOSYLATED A1C: CPT

## 2025-03-13 PROCEDURE — 80048 BASIC METABOLIC PNL TOTAL CA: CPT

## 2025-03-13 PROCEDURE — 82570 ASSAY OF URINE CREATININE: CPT

## 2025-03-13 PROCEDURE — 80061 LIPID PANEL: CPT

## 2025-03-13 PROCEDURE — 84450 TRANSFERASE (AST) (SGOT): CPT

## 2025-03-13 PROCEDURE — 36415 COLL VENOUS BLD VENIPUNCTURE: CPT

## 2025-03-13 PROCEDURE — 83001 ASSAY OF GONADOTROPIN (FSH): CPT

## 2025-03-13 RX ORDER — SCOPOLAMINE 1 MG/3D
1 PATCH, EXTENDED RELEASE TRANSDERMAL
Qty: 3 PATCH | Refills: 3 | Status: SHIPPED | OUTPATIENT
Start: 2025-03-13

## 2025-03-13 NOTE — TELEPHONE ENCOUNTER
Prior authorization completed for semaglutide 4 MG/3ML Subcutaneous Solution Pen-injector via [employer]  Awaiting outcome.

## 2025-03-13 NOTE — PROGRESS NOTES
Blood pressure 127/80, pulse 78, height 5' 2\" (1.575 m), weight 175 lb (79.4 kg), not currently breastfeeding.      FOLLOWING UP FOR DIABETES STILL WITH MONTHLY MENSES.  LOST INSURANCE SO STOPPED HER MEDICATION.   RASH UNDER LEFT BREAST USING HYDROCORTISONE   OTHERWISE FEELS WELL.    OBJECTIVE     Bilateral barefoot skin diabetic exam is normal, visualized feet and the appearance is normal.  Bilateral monofilament/sensation of both feet is normal.  Pulsation pedal pulse exam of both lower legs/feet is normal as well.   HYPERPIGMENTED RASH UNDER LEFT BREAST     1. Type 2 diabetes mellitus without complication, with long-term current use of insulin ([facility])  OZEMPIC REFILLED   - ALT(SGPT); Future  - AST (SGOT); Future  - Basic Metabolic Panel (8); Future  - Lipid Panel; Future  - Microalb/Creat Ratio, Random Urine; Future  - Hemoglobin A1C; Future  - FSH; Future  - Diabetic Retinopathy Exam  OU - Both Eyes; Future  - semaglutide 4 MG/3ML Subcutaneous Solution Pen-injector; Inject 1 mg into the skin once a week.  Dispense: 3 mL; Refill: 12    2. Panic anxiety syndrome  NO COMPLAINT AT THIS TIME     3. Hypertension, unspecified type  CONTROLLED ON AMLODIPINE     4. Screening mammogram, encounter for    - [facility] RICKY 2D+3D SCREENING BILAT (CPT=77067/[id number]); Future    5. Atypical mole  LOWER EXTREMITIES AND RLQ ABDOMEN  - DERM - INTERNAL

## 2025-03-14 NOTE — TELEPHONE ENCOUNTER
Denied    Note from payer: Your PA request has been denied.  Additional information will be provided in the denial communication. (Message 1145)  Payer: [facility] Case ID: [id number]    [phone]  Electronic appeal: Not supported  Appeal instructions: Your PA request has been denied.  Additional information will be provided in the denial communication. (Message 1143)  View History

## 2025-03-20 RX ORDER — AMLODIPINE BESYLATE 5 MG/1
5 TABLET ORAL DAILY
Qty: 90 TABLET | Refills: 3 | Status: SHIPPED | OUTPATIENT
Start: 2025-03-20

## 2025-03-20 NOTE — TELEPHONE ENCOUNTER
Refill Per Protocol     Requested Prescriptions   Pending Prescriptions Disp Refills    AMLODIPINE 5 MG Oral Tab [Pharmacy Med Name: AMLODIPINE BESYLATE 5 MG TAB] 90 tablet 3     Sig: Take 1 tablet (5 mg total) by mouth daily.       Hypertension Medications Protocol Passed - 3/20/2025  1:49 PM        Passed - CMP or BMP in past 12 months        Passed - Last BP reading less than 140/90     BP Readings from Last 1 Encounters:   03/13/25 127/80               Passed - In person appointment or virtual visit in the past 12 mos or appointment in next 3 mos     Recent Outpatient Visits              1 week ago Type 2 diabetes mellitus without complication, with long-term current use of insulin ([facility]    [facility]    5 months ago COVID-19    [facility] [place] [facility]    Telemedicine    9 months ago Type 2 diabetes mellitus without complication, with long-term current use of insulin ([facility])    [facility] [place]    Office Visit    12 months ago Panic anxiety syndrome    [facility]    [facility]    1 year ago Panic anxiety syndrome    [facility]    [facility]          Future Appointments         Provider Department Appt Notes    In 2 weeks ROMAN ALEXANDRE RM1; ROMAN VÁZQUEZ RM1 [facility] - Be     In 1 month Ezekiel Figueredo MD [facility], [address], [place] ref by pcp                    Passed - EGFRCR or GFRAA > 50     GFR Evaluation  EGFRCR: 97 , resulted on 3/13/2025          Passed - Medication is active on med list

## 2025-04-03 ENCOUNTER — TELEPHONE (OUTPATIENT)
Dept: FAMILY MEDICINE CLINIC | Facility: CLINIC | Age: 45
End: 2025-04-03

## 2025-04-03 NOTE — TELEPHONE ENCOUNTER
semaglutide (OZEMPIC, 1 MG/DOSE,) 4 MG/3ML Subcutaneous Solution Pen-injector, Inject 1 mg into the skin once a week. (Patient not taking: Reported on 3/13/2025), Disp: 12 each, Rfl: 3

## 2025-04-04 ENCOUNTER — HOSPITAL ENCOUNTER (OUTPATIENT)
Dept: MAMMOGRAPHY | Age: 45
Discharge: HOME | End: 2025-04-04
Attending: FAMILY MEDICINE
Payer: MEDICAID

## 2025-04-04 DIAGNOSIS — Z12.31 SCREENING MAMMOGRAM, ENCOUNTER FOR: ICD-10-CM

## 2025-04-04 PROCEDURE — 77067 SCR MAMMO BI INCL CAD: CPT | Performed by: FAMILY MEDICINE

## 2025-04-04 PROCEDURE — 77063 BREAST TOMOSYNTHESIS BI: CPT | Performed by: FAMILY MEDICINE

## 2025-04-07 ENCOUNTER — TELEPHONE (OUTPATIENT)
Dept: FAMILY MEDICINE CLINIC | Facility: CLINIC | Age: 45
End: 2025-04-07

## 2025-04-07 RX ORDER — SEMAGLUTIDE 1.34 MG/ML
1 INJECTION, SOLUTION SUBCUTANEOUS WEEKLY
Qty: 12 EACH | Refills: 0 | Status: CANCELLED | OUTPATIENT
Start: 2025-04-07

## 2025-04-07 NOTE — TELEPHONE ENCOUNTER
Arlette awad is waiting for their medication Ozempic( 1 MG/Dose) 4 mg/3ml Pen injectors    Key: [id number]

## 2025-06-17 RX ORDER — ESCITALOPRAM OXALATE 20 MG/1
20 TABLET ORAL DAILY
Qty: 90 TABLET | Refills: 3 | Status: SHIPPED | OUTPATIENT
Start: 2025-06-17

## 2025-06-17 NOTE — TELEPHONE ENCOUNTER
Refill passed per [facility] protocols.    Please review pended refill request as unable to refill due to High / Very High drug interaction or warning copied here:    High  Drug-Drug: ibuprofen and escitalopram  Toxic effects may be increased with concurrent administration of ibuprofen and Selective Serotonin Reuptake Inhibitors. The risk of upper gastrointestinal bleeding may be increased. Patients taking both drugs concurrently should be educated about the signs and symptoms of GI bleeding.    Requested Prescriptions   Pending Prescriptions Disp Refills    ESCITALOPRAM 20 MG Oral Tab [Pharmacy Med Name: ESCITALOPRAM 20 MG TABLET] 90 tablet 3     Sig: TAKE 1 TABLET BY MOUTH EVERY DAY       Psychiatric Non-Scheduled (Anti-Anxiety) Passed - 6/17/2025  3:16 PM

## 2025-07-07 ENCOUNTER — LAB ENCOUNTER (OUTPATIENT)
Dept: LAB | Age: 45
End: 2025-07-07
Attending: FAMILY MEDICINE
Payer: MEDICAID

## 2025-07-07 ENCOUNTER — OFFICE VISIT (OUTPATIENT)
Dept: FAMILY MEDICINE CLINIC | Facility: CLINIC | Age: 45
End: 2025-07-07
Payer: MEDICAID

## 2025-07-07 VITALS
WEIGHT: 177 LBS | SYSTOLIC BLOOD PRESSURE: 111 MMHG | DIASTOLIC BLOOD PRESSURE: 70 MMHG | HEIGHT: 62 IN | BODY MASS INDEX: 32.57 KG/M2

## 2025-07-07 DIAGNOSIS — N92.0 MENORRHAGIA WITH REGULAR CYCLE: Primary | ICD-10-CM

## 2025-07-07 DIAGNOSIS — F41.0 PANIC ANXIETY SYNDROME: ICD-10-CM

## 2025-07-07 DIAGNOSIS — Z79.4 TYPE 2 DIABETES MELLITUS WITHOUT COMPLICATION, WITH LONG-TERM CURRENT USE OF INSULIN (HCC): ICD-10-CM

## 2025-07-07 DIAGNOSIS — N92.0 MENORRHAGIA WITH REGULAR CYCLE: ICD-10-CM

## 2025-07-07 DIAGNOSIS — E11.9 TYPE 2 DIABETES MELLITUS WITHOUT COMPLICATION, WITH LONG-TERM CURRENT USE OF INSULIN (HCC): ICD-10-CM

## 2025-07-07 LAB
ALT SERPL-CCNC: 14 U/L (ref 10–49)
ANION GAP SERPL CALC-SCNC: 9 MMOL/L (ref 0–18)
AST SERPL-CCNC: 16 U/L (ref ?–34)
BASOPHILS # BLD AUTO: 0.05 X10(3) UL (ref 0–0.2)
BASOPHILS NFR BLD AUTO: 1.1 %
BUN BLD-MCNC: 8 MG/DL (ref 9–23)
BUN/CREAT SERPL: 8.7 (ref 10–20)
CALCIUM BLD-MCNC: 9.6 MG/DL (ref 8.7–10.4)
CHLORIDE SERPL-SCNC: 104 MMOL/L (ref 98–112)
CHOLEST SERPL-MCNC: 180 MG/DL (ref ?–200)
CO2 SERPL-SCNC: 27 MMOL/L (ref 21–32)
CONTROL LINE PRESENT WITH A CLEAR BACKGROUND (YES/NO): YES YES/NO
CREAT BLD-MCNC: 0.92 MG/DL (ref 0.55–1.02)
DEPRECATED RDW RBC AUTO: 48.1 FL (ref 35.1–46.3)
EGFRCR SERPLBLD CKD-EPI 2021: 80 ML/MIN/1.73M2 (ref 60–?)
EOSINOPHIL # BLD AUTO: 0.12 X10(3) UL (ref 0–0.7)
EOSINOPHIL NFR BLD AUTO: 2.7 %
ERYTHROCYTE [DISTWIDTH] IN BLOOD BY AUTOMATED COUNT: 15.7 % (ref 11–15)
EST. AVERAGE GLUCOSE BLD GHB EST-MCNC: 183 MG/DL (ref 68–126)
FASTING PATIENT LIPID ANSWER: NO
FASTING STATUS PATIENT QL REPORTED: NO
GLUCOSE BLD-MCNC: 165 MG/DL (ref 70–99)
HBA1C MFR BLD: 8 % (ref ?–5.7)
HCG SERPL QL: NEGATIVE
HCT VFR BLD AUTO: 37.3 % (ref 35–48)
HDLC SERPL-MCNC: 44 MG/DL (ref 40–59)
HGB BLD-MCNC: 11.6 G/DL (ref 12–16)
IMM GRANULOCYTES # BLD AUTO: 0.01 X10(3) UL (ref 0–1)
IMM GRANULOCYTES NFR BLD: 0.2 %
KIT EXPIRATION DATE: NORMAL DATE
KIT LOT #: NORMAL NUMERIC
LDLC SERPL CALC-MCNC: 112 MG/DL (ref ?–100)
LYMPHOCYTES # BLD AUTO: 1.69 X10(3) UL (ref 1–4)
LYMPHOCYTES NFR BLD AUTO: 37.5 %
MCH RBC QN AUTO: 26 PG (ref 26–34)
MCHC RBC AUTO-ENTMCNC: 31.1 G/DL (ref 31–37)
MCV RBC AUTO: 83.6 FL (ref 80–100)
MONOCYTES # BLD AUTO: 0.44 X10(3) UL (ref 0.1–1)
MONOCYTES NFR BLD AUTO: 9.8 %
NEUTROPHILS # BLD AUTO: 2.2 X10 (3) UL (ref 1.5–7.7)
NEUTROPHILS # BLD AUTO: 2.2 X10(3) UL (ref 1.5–7.7)
NEUTROPHILS NFR BLD AUTO: 48.7 %
NONHDLC SERPL-MCNC: 136 MG/DL (ref ?–130)
OSMOLALITY SERPL CALC.SUM OF ELEC: 292 MOSM/KG (ref 275–295)
PLATELET # BLD AUTO: 279 10(3)UL (ref 150–450)
POTASSIUM SERPL-SCNC: 4.1 MMOL/L (ref 3.5–5.1)
PREGNANCY TEST, URINE: NEGATIVE
RBC # BLD AUTO: 4.46 X10(6)UL (ref 3.8–5.3)
SODIUM SERPL-SCNC: 140 MMOL/L (ref 136–145)
TRIGL SERPL-MCNC: 134 MG/DL (ref 30–149)
TSI SER-ACNC: 1.36 UIU/ML (ref 0.55–4.78)
VLDLC SERPL CALC-MCNC: 23 MG/DL (ref 0–30)
WBC # BLD AUTO: 4.5 X10(3) UL (ref 4–11)

## 2025-07-07 PROCEDURE — 84443 ASSAY THYROID STIM HORMONE: CPT

## 2025-07-07 PROCEDURE — 81025 URINE PREGNANCY TEST: CPT | Performed by: FAMILY MEDICINE

## 2025-07-07 PROCEDURE — 83036 HEMOGLOBIN GLYCOSYLATED A1C: CPT

## 2025-07-07 PROCEDURE — 84450 TRANSFERASE (AST) (SGOT): CPT

## 2025-07-07 PROCEDURE — 80061 LIPID PANEL: CPT

## 2025-07-07 PROCEDURE — 80048 BASIC METABOLIC PNL TOTAL CA: CPT

## 2025-07-07 PROCEDURE — 84703 CHORIONIC GONADOTROPIN ASSAY: CPT

## 2025-07-07 PROCEDURE — 85025 COMPLETE CBC W/AUTO DIFF WBC: CPT

## 2025-07-07 PROCEDURE — 36415 COLL VENOUS BLD VENIPUNCTURE: CPT

## 2025-07-07 PROCEDURE — 99213 OFFICE O/P EST LOW 20 MIN: CPT | Performed by: FAMILY MEDICINE

## 2025-07-07 PROCEDURE — 84460 ALANINE AMINO (ALT) (SGPT): CPT

## 2025-07-07 NOTE — PROGRESS NOTES
Blood pressure 111/70, height 5' 2\" (1.575 m), weight 177 lb (80.3 kg), last menstrual period 05/21/2025, not currently breastfeeding.  Anxiety attacks again worse.  Debilitating.  Having difficulty securing employment.  Smokes marijuana nightly.  Was smoking it all day every day.  No suicidal ideation no other drug use.  Using marijuana to sleep.  Has a history of heavy menses.    OBJECTIVE     Appropriate mood and affect  Assessment anxiety with panic and menorrhagia    Plan blood test today    Stop all marijuana use    Follow-up 1 week

## 2025-07-14 ENCOUNTER — OFFICE VISIT (OUTPATIENT)
Dept: FAMILY MEDICINE CLINIC | Facility: CLINIC | Age: 45
End: 2025-07-14

## 2025-07-14 VITALS
HEART RATE: 61 BPM | DIASTOLIC BLOOD PRESSURE: 75 MMHG | HEIGHT: 62 IN | WEIGHT: 172 LBS | SYSTOLIC BLOOD PRESSURE: 120 MMHG | BODY MASS INDEX: 31.65 KG/M2

## 2025-07-14 DIAGNOSIS — E11.9 TYPE 2 DIABETES MELLITUS WITHOUT COMPLICATION, WITH LONG-TERM CURRENT USE OF INSULIN (HCC): ICD-10-CM

## 2025-07-14 DIAGNOSIS — N92.0 MENORRHAGIA WITH REGULAR CYCLE: Primary | ICD-10-CM

## 2025-07-14 DIAGNOSIS — Z79.4 TYPE 2 DIABETES MELLITUS WITHOUT COMPLICATION, WITH LONG-TERM CURRENT USE OF INSULIN (HCC): ICD-10-CM

## 2025-07-14 DIAGNOSIS — F41.0 PANIC ANXIETY SYNDROME: ICD-10-CM

## 2025-07-14 DIAGNOSIS — D64.9 ANEMIA, UNSPECIFIED TYPE: ICD-10-CM

## 2025-07-14 DIAGNOSIS — O24.111: ICD-10-CM

## 2025-07-14 PROCEDURE — 99214 OFFICE O/P EST MOD 30 MIN: CPT | Performed by: FAMILY MEDICINE

## 2025-07-14 RX ORDER — BLOOD SUGAR DIAGNOSTIC
1 STRIP MISCELLANEOUS DAILY
Qty: 400 STRIP | Refills: 3 | Status: SHIPPED | OUTPATIENT
Start: 2025-07-14

## 2025-07-14 RX ORDER — VENLAFAXINE 75 MG/1
75 TABLET ORAL 2 TIMES DAILY
Qty: 60 TABLET | Refills: 1 | Status: SHIPPED | OUTPATIENT
Start: 2025-07-14

## 2025-07-14 RX ORDER — FERROUS SULFATE 325(65) MG
325 TABLET ORAL 2 TIMES DAILY
Qty: 60 TABLET | Refills: 1 | Status: SHIPPED | OUTPATIENT
Start: 2025-07-14 | End: 2026-01-10

## 2025-07-14 NOTE — PROGRESS NOTES
Subjective:   Arltete Collins is a 44 year old female who presents for Follow - Up       History/Other:   History of Present Illness  Arlette Collins is a 44 year old female with anxiety and diabetes who presents with panic attacks and concerns about her menstrual cycle.    She has experienced panic attacks over the past week, which she finds distressing. Her anxiety is managed with Lexapro 20 mg daily, but she still experiences significant nervousness during important events. She has stopped therapy due to financial issues but is considering returning now that she has Medicaid. She denies suicidal thoughts but experiences occasional sadness and fluctuating energy levels.    She is currently menstruating and feels unwell, with a history of heavy menstrual bleeding and fibroids. She suspects the fibroids may have returned.    Her diabetes management is suboptimal, with a recent A1c level that was not ideal. She has not been checking her blood sugar regularly due to  test strips. She is aware of the importance of monitoring her glucose levels but has been inconsistent.    She recently quit smoking and is experiencing increased irritability. Her appetite has decreased since starting Ozempic, making it difficult to eat.   Chief Complaint Reviewed and Verified  No Further Nursing Notes to   Review  Medications Reviewed         Tobacco:  She smoked tobacco in the past but quit greater than 12 months ago.  Tobacco Use[1]     Current Medications[2]           Review of Systems:  Pertinent items are noted in HPI.      Objective:   /75 (BP Location: Left arm, Patient Position: Sitting)   Pulse 61   Ht 5' 2\" (1.575 m)   Wt 172 lb (78 kg)   LMP 2025 (Exact Date)   BMI 31.46 kg/m²  Estimated body mass index is 31.46 kg/m² as calculated from the following:    Height as of this encounter: 5' 2\" (1.575 m).    Weight as of this encounter: 172 lb (78 kg).  Results  LABS  MCV: within normal range  Hemoglobin  A1c: elevated     Physical Exam      Appropriate mood and affect    Assessment & Plan:   1. Menorrhagia with regular cycle (Primary)  -     US PELVIS (TRANSABDOMINAL AND TRANSVAGINAL) (CPT=76856/94738); Future; Expected date: 2025  -     [facility] ([facility])  2. Anemia, unspecified type  -     [facility]astro [facility] - [facility] ([facility])  3. Type 2 diabetes mellitus without complication, with long-term current use of insulin ([facility])  Overview:  IMPRESSION:  IUP at 12w5d  Normal NT ultrasound  Multiple uterine fibroids  Advanced maternal age, [place] screen drawn today  Chronic hypertension  Obesity complicating pregnancy  Type 2 diabetes diagnosed in early pregnancy uncontrolled;   History of myomectomy     RECOMMENDATIONS:  Continue care with Dr. Dia  Daily low-dose aspirin (1.5 tablets or approximately 120 mg daily)  Continue home blood pressure monitoring and antihypertensive medication  Continue GDM diet and blood sugar monitoring 4 times daily; begin to send her blood sugar log to [facility] every week for review  Limit gestational weight gain to 18 pounds or less  Growth ultrasound and cervical length at 16 weeks  Level 2 ultrasound at 20 weeks  Fetal echocardiogram at 24 weeks  Monthly growth ultrasound and BPP starting at 28 weeks  Plan  delivery at 37 weeks due to history of myomectomy  Begin Levemir insulin 14 units at at bedtime  Ophthalmology exam needed     Orders:  -     Ophthalmology Referral - In Network  -     Cancel: Referral to [facility]  4. Panic anxiety syndrome  -     Behavioral Health Consultation  5. Pre-existing type 2 diabetes mellitus, in pregnancy, first trimester (HCC)  -     OneTouch Verio; 1 strip by In Vitro route daily.  Dispense: 400 strip; Refill: 3  Other orders  -     metFORMIN HCl; Take 1 tablet (500 mg total) by mouth 2 (two) times daily with meals. For diabetes.  Dispense: 60 tablet; Refill: 3  -     Ferrous Sulfate; Take 1 tablet  (325 mg total) by mouth 2 (two) times daily.  Dispense: 60 tablet; Refill: 1  -     Venlafaxine HCl; Take 1 tablet (75 mg total) by mouth 2 (two) times daily.  Dispense: 60 tablet; Refill: 1    Assessment & Plan  Anxiety with panic attacks  She experiences panic attacks despite escitalopram 20 mg daily, indicating ineffectiveness. Therapy was discontinued due to financial constraints, but she is open to resuming it. [facility] may provide therapy and psychiatry services. Venlafaxine is considered for its dual receptor action, potentially improving anxiety, depression, and low motivation.  - Stop escitalopram 20 mg daily.  - Start venlafaxine 75 mg twice daily, with a maximum dose of 225 mg if needed.  - Provide information for [facility] for therapy and psychiatry services.  - Discuss potential side effects of venlafaxine, including rare worsening of anxiety and common gastrointestinal side effects such as diarrhea and stomach upset.    Diabetes mellitus  Elevated A1c indicates poor glycemic control. She has not been monitoring blood glucose regularly and has not used metformin before. Metformin is recommended for its safety profile, preventing excess glucose production without causing weight gain or hypoglycemia.  - Start metformin.  - Order OneTouch Verio test strips for blood glucose monitoring.  - Encourage attendance at diabetes education classes with a nutritionist.    Menorrhagia with suspected anemia  Heavy menstrual bleeding is suspected to cause anemia. Normal MCV suggests anemia may not be solely due to bleeding. Possible fibroids may contribute to menorrhagia, necessitating a pelvic ultrasound.  - Refer to gynecologist for evaluation of heavy menstrual bleeding and possible fibroids.  - Order pelvic ultrasound to assess for fibroids.    Substance use cessation  She recently ceased substance use, previously used for appetite and anxiety management. She reports  irritability since quitting, with reassurance provided that the first week is the most challenging. Coping mechanisms need to be relearned.  - Encourage continued abstinence from substance use.  - Reassure that the first week of cessation is the most challenging and that coping mechanisms will need to be relearned.    Follow-up  Monitor response to new medication regimen and diabetes management.  - Schedule follow-up appointment in one week.  Also discussed following up gastroenterology for anemia  Recording duration: 16 minutes        Return in about 1 week (around 7/21/2025).        Govind Beard DO, 7/14/2025, 11:51 AM             [1]   Social History  Tobacco Use   Smoking Status Former    Types: Cigarettes    Passive exposure: Past   Smokeless Tobacco Never   Tobacco Comments    smoked as a teenager   [2]   Current Outpatient Medications   Medication Sig Dispense Refill    metFORMIN 500 MG Oral Tab Take 1 tablet (500 mg total) by mouth 2 (two) times daily with meals. For diabetes. 60 tablet 3    Ferrous Sulfate 325 (65 Fe) MG Oral Tab Take 1 tablet (325 mg total) by mouth 2 (two) times daily. 60 tablet 1    venlafaxine 75 MG Oral Tab Take 1 tablet (75 mg total) by mouth 2 (two) times daily. 60 tablet 1    Glucose Blood (ONETOUCH VERIO) In Vitro Strip 1 strip by In Vitro route daily. 400 strip 3    amLODIPine 5 MG Oral Tab Take 1 tablet (5 mg total) by mouth daily. 90 tablet 3    metoprolol succinate ER 50 MG Oral Tablet 24 Hr Take 1 tablet (50 mg total) by mouth daily. 90 tablet 2    albuterol (PROAIR HFA) 108 (90 Base) MCG/ACT Inhalation Aero Soln Inhale 2 puffs into the lungs every 4 (four) hours as needed for Wheezing. 1 each 1    Albuterol Sulfate (PROAIR RESPICLICK) 108 (90 Base) MCG/ACT Inhalation Aerosol Powder, Breath Activated Inhale 2 puffs into the lungs every 4 to 6 hours as needed. 1 each 5    albuterol (VENTOLIN HFA) 108 (90 Base) MCG/ACT Inhalation Aero Soln Inhale 2 puffs into the lungs  every 4 to 6 hours as needed for Wheezing. 1 each 5    Levocetirizine Dihydrochloride 5 MG Oral Tab TAKE 1 TABLET BY MOUTH NIGHTLY. 90 tablet 1    Glucose Blood (CONTOUR NEXT TEST) In Vitro Strip Use to check blood glucose 4 times daily 400 each 3    OneTouch Delica Lancets 33G Does not apply Misc Use to check insulin 4-5 times daily. 300 each 11    Insulin Pen Needle (PEN NEEDLES) 31G X 6 MM Does not apply Misc Inject 1 each into the skin 5 (five) times daily. To use with insulin daily 500 each 1

## 2025-07-14 NOTE — TELEPHONE ENCOUNTER
Glucose Blood (ONETOUCH VERIO) In Vitro Strip 1 strip by In Vitro route daily. 400 strip 3     Refill Requested

## 2025-07-21 ENCOUNTER — TELEPHONE (OUTPATIENT)
Age: 45
End: 2025-07-21

## 2025-07-21 NOTE — TELEPHONE ENCOUNTER
Outpatient Medication Detail     Disp Refills Start End    Glucose Blood (ONETOUCH VERIO) In Vitro Strip 400 strip 3 7/14/2025 --    Sig - Route: 1 strip by In Vitro route daily. - In Vitro    Sent to pharmacy as: OneTouch Verio In Vitro Strip    E-Prescribing Status: Receipt confirmed by pharmacy (7/14/2025 11:49 AM CDT)      Associated Diagnoses    Pre-existing type 2 diabetes mellitus, in pregnancy, first trimester ([facility])        Pharmacy    [id number] [place] [facility] [place] - [facility] [address] [address] [phone], [phone]

## 2025-07-21 NOTE — TELEPHONE ENCOUNTER
Hello,    Sorry I missed you - I am reaching out from the [facility] department, following up on an order from your provider's office to assist in connecting you with resources for care. If you would like to discuss this further, please give us a call back at [phone], or for more immediate assistance you can contact our 24-hour help line at [phone]. We look forward to hearing from you soon.

## 2025-07-24 ENCOUNTER — OFFICE VISIT (OUTPATIENT)
Dept: FAMILY MEDICINE CLINIC | Facility: CLINIC | Age: 45
End: 2025-07-24
Payer: MEDICAID

## 2025-07-24 ENCOUNTER — TELEPHONE (OUTPATIENT)
Dept: FAMILY MEDICINE CLINIC | Facility: CLINIC | Age: 45
End: 2025-07-24

## 2025-07-24 VITALS
HEART RATE: 72 BPM | SYSTOLIC BLOOD PRESSURE: 125 MMHG | WEIGHT: 173 LBS | DIASTOLIC BLOOD PRESSURE: 84 MMHG | BODY MASS INDEX: 31.83 KG/M2 | HEIGHT: 62 IN

## 2025-07-24 DIAGNOSIS — D64.9 ANEMIA, UNSPECIFIED TYPE: Primary | ICD-10-CM

## 2025-07-24 DIAGNOSIS — F41.0 PANIC ANXIETY SYNDROME: ICD-10-CM

## 2025-07-24 DIAGNOSIS — N92.0 MENORRHAGIA WITH REGULAR CYCLE: ICD-10-CM

## 2025-07-24 DIAGNOSIS — E11.9 TYPE 2 DIABETES MELLITUS WITHOUT COMPLICATION, WITH LONG-TERM CURRENT USE OF INSULIN (HCC): ICD-10-CM

## 2025-07-24 DIAGNOSIS — Z79.4 TYPE 2 DIABETES MELLITUS WITHOUT COMPLICATION, WITH LONG-TERM CURRENT USE OF INSULIN (HCC): ICD-10-CM

## 2025-07-24 PROCEDURE — 99213 OFFICE O/P EST LOW 20 MIN: CPT | Performed by: FAMILY MEDICINE

## 2025-07-24 RX ORDER — ALCOHOL ANTISEPTIC PADS
PADS, MEDICATED (EA) TOPICAL
Qty: 100 EACH | Refills: 5 | Status: SHIPPED | OUTPATIENT
Start: 2025-07-24

## 2025-07-24 RX ORDER — BLOOD SUGAR DIAGNOSTIC
STRIP MISCELLANEOUS
Qty: 100 EACH | Refills: 5 | Status: SHIPPED | OUTPATIENT
Start: 2025-07-24

## 2025-07-24 RX ORDER — BLOOD-GLUCOSE METER
1 EACH MISCELLANEOUS 3 TIMES DAILY
Qty: 1 KIT | Refills: 0 | Status: SHIPPED | OUTPATIENT
Start: 2025-07-24

## 2025-07-24 RX ORDER — FERROUS SULFATE 325(65) MG
325 TABLET ORAL
COMMUNITY
Start: 2025-07-24

## 2025-07-24 RX ORDER — VENLAFAXINE HYDROCHLORIDE 150 MG/1
150 CAPSULE, EXTENDED RELEASE ORAL NIGHTLY
Qty: 30 CAPSULE | Refills: 2 | Status: SHIPPED | OUTPATIENT
Start: 2025-07-24

## 2025-07-24 NOTE — PROGRESS NOTES
Subjective:   Arlette Collins is a 44 year old female who presents for Follow - Up       History/Other:   History of Present Illness  Arlette Collins is a 44 year old female with anemia and anxiety who presents for follow-up on her current treatment regimen.    Anemia persists with a hemoglobin level of 11.6. A pelvic ultrasound is scheduled for August 19th. She has not yet followed up with a gastroenterologist due to adjusting to new medications. Heavy menstrual bleeding continues, and she takes iron supplements once daily to avoid gastrointestinal discomfort.    Anxiety is managed with 75 mg of medication twice daily, causing unusual sleepiness. Panic attacks have reduced to two in the last week and a half. She reports feeling slightly more positive but still experiences fatigue. She is three weeks into sobriety and feels more focused, though personal stress remains a factor.    She engages in daily outdoor activities with her child and is currently unemployed, expressing a need for a job to facilitate . No suicidal thoughts are present.   Chief Complaint Reviewed and Verified  No Further Nursing Notes to   Review  Tobacco Reviewed  Allergies Reviewed  Medications Reviewed    Medical History Reviewed  Surgical History Reviewed  OB Status Reviewed    Family History Reviewed  Social History Reviewed         Tobacco:  She smoked tobacco in the past but quit greater than 12 months ago.  Tobacco Use[1]     Current Medications[2]           Review of Systems:  Pertinent items are noted in HPI.      Objective:   /84   Pulse 72   Ht 5' 2\" (1.575 m)   Wt 173 lb (78.5 kg)   LMP 07/12/2025 (Approximate)   BMI 31.64 kg/m²  Estimated body mass index is 31.64 kg/m² as calculated from the following:    Height as of this encounter: 5' 2\" (1.575 m).    Weight as of this encounter: 173 lb (78.5 kg).  Results  LABS  Hemoglobin: 11.6 g/dL  Blood Glucose: 165 mg/dL  HbA1c: 8%     Physical Exam  Appropriate  mood and affect seen        Assessment & Plan:   1. Anemia, unspecified type (Primary)  2. Menorrhagia with regular cycle  3. Type 2 diabetes mellitus without complication, with long-term current use of insulin ([facility])  Overview:  IMPRESSION:  IUP at 12w5d  Normal NT ultrasound  Multiple uterine fibroids  Advanced maternal age, [place] screen drawn today  Chronic hypertension  Obesity complicating pregnancy  Type 2 diabetes diagnosed in early pregnancy uncontrolled;   History of myomectomy     RECOMMENDATIONS:  Continue care with Dr. Dia  Daily low-dose aspirin (1.5 tablets or approximately 120 mg daily)  Continue home blood pressure monitoring and antihypertensive medication  Continue GDM diet and blood sugar monitoring 4 times daily; begin to send her blood sugar log to [facility] every week for review  Limit gestational weight gain to 18 pounds or less  Growth ultrasound and cervical length at 16 weeks  Level 2 ultrasound at 20 weeks  Fetal echocardiogram at 24 weeks  Monthly growth ultrasound and BPP starting at 28 weeks  Plan  delivery at 37 weeks due to history of myomectomy  Begin Levemir insulin 14 units at at bedtime  Ophthalmology exam needed     4. Panic anxiety syndrome  Other orders  -     Venlafaxine HCl ER; Take 1 capsule (150 mg total) by mouth at bedtime.  Dispense: 30 capsule; Refill: 2    Assessment & Plan  Menorrhagia  Continues to experience heavy menstrual bleeding with concerns about potential surgical intervention, though none is currently planned. A pelvic ultrasound is scheduled for  to evaluate the cause. She is apprehensive about surgery due to past experiences.  - Proceed with the scheduled pelvic ultrasound on .  - Follow up with gynecology, specifically Dr. Zabala, for further evaluation and management.    Anemia  Hemoglobin level is 11.6, indicating mild anemia likely secondary to menorrhagia. She experiences gastrointestinal discomfort with two iron  tablets daily, so she takes one tablet daily, which is better tolerated.  - Continue taking one ferrous sulfate 325 mg tablet daily as tolerated.    Panic anxiety syndrome  Reports reduced frequency of panic attacks, now twice in the last week and a half. Currently on venlafaxine 75 mg twice daily but experiences drowsiness, a side effect in 15% of patients. She feels more positive but is concerned about drowsiness.  - Switch to venlafaxine extended-release 150 mg once daily, to be taken at bedtime or an hour before sleep, to reduce daytime drowsiness and improve adherence.    Substance use cessation  In her third week of sobriety, experiencing stress-related anxiety. Encouraged by progress but advised to seek support to prevent relapse. Discussed the importance of therapy to support sobriety and manage stress.  - Encourage appointment with a therapist or psychologist for additional support.  - Reinforce the importance of maintaining sobriety and seeking help if needed.    General Health Maintenance  Engages in regular physical activity by walking daily with her child, beneficial for overall health and stress management.  - Continue daily physical activity, such as walking, to support overall health and stress management.    Recording duration: 9 minutes        Return in about 3 weeks (around 8/14/2025).        Govind Beard DO, 7/24/2025, 5:01 PM             [1]   Social History  Tobacco Use   Smoking Status Former    Types: Cigarettes    Passive exposure: Past   Smokeless Tobacco Never   Tobacco Comments    smoked as a teenager   [2]   Current Outpatient Medications   Medication Sig Dispense Refill    venlafaxine  MG Oral Capsule SR 24 Hr Take 1 capsule (150 mg total) by mouth at bedtime. 30 capsule 2    Ferrous Sulfate 325 (65 Fe) MG Oral Tab Take 1 tablet (325 mg total) by mouth daily with breakfast.      metFORMIN 500 MG Oral Tab Take 1 tablet (500 mg total) by mouth 2 (two) times daily with meals.  For diabetes. 60 tablet 3    amLODIPine 5 MG Oral Tab Take 1 tablet (5 mg total) by mouth daily. 90 tablet 3    metoprolol succinate ER 50 MG Oral Tablet 24 Hr Take 1 tablet (50 mg total) by mouth daily. 90 tablet 2    albuterol (PROAIR HFA) 108 (90 Base) MCG/ACT Inhalation Aero Soln Inhale 2 puffs into the lungs every 4 (four) hours as needed for Wheezing. 1 each 1    Glucose Blood (CONTOUR NEXT TEST) In Vitro Strip Use to check blood glucose 4 times daily 400 each 3    Insulin Pen Needle (PEN NEEDLES) 31G X 6 MM Does not apply Misc Inject 1 each into the skin 5 (five) times daily. To use with insulin daily 500 each 1    Albuterol Sulfate (PROAIR RESPICLICK) 108 (90 Base) MCG/ACT Inhalation Aerosol Powder, Breath Activated Inhale 2 puffs into the lungs every 4 to 6 hours as needed. 1 each 5    albuterol (VENTOLIN HFA) 108 (90 Base) MCG/ACT Inhalation Aero Soln Inhale 2 puffs into the lungs every 4 to 6 hours as needed for Wheezing. 1 each 5    Levocetirizine Dihydrochloride 5 MG Oral Tab TAKE 1 TABLET BY MOUTH NIGHTLY. 90 tablet 1    Blood Glucose Monitoring Suppl (CONTOUR PLUS BLUE) w/Device Does not apply Kit 1 kit in the morning, at noon, and at bedtime. Test blood sugar 3 times per day 1 kit 0    Glucose Blood (CONTOUR PLUS TEST) In Vitro Strip Test blood sugar 3 times per day 100 each 5    Lancets 30G Does not apply Misc Test blood sugar 3 times per day 100 each 5

## 2025-07-24 NOTE — TELEPHONE ENCOUNTER
Patient calling ( name and date of birth of patient verified )  LOV  today  was told she would get BG supplies     Patient is at the pharmacy and they do not have the RX for  BG Meter, lancets, strips     Patient is checking with the Pharmacist about which items are covered by insurance   Contour is covered , she  tests 3 times a day       Medication pended for your review / approval  for 3 times per day testing

## 2025-08-07 RX ORDER — FERROUS SULFATE 325(65) MG
325 TABLET ORAL
Qty: 30 TABLET | Refills: 0 | Status: SHIPPED | OUTPATIENT
Start: 2025-08-07

## 2025-08-14 ENCOUNTER — TELEPHONE (OUTPATIENT)
Facility: CLINIC | Age: 45
End: 2025-08-14

## 2025-08-14 ENCOUNTER — OFFICE VISIT (OUTPATIENT)
Facility: CLINIC | Age: 45
End: 2025-08-14

## 2025-08-14 VITALS
WEIGHT: 174 LBS | SYSTOLIC BLOOD PRESSURE: 119 MMHG | DIASTOLIC BLOOD PRESSURE: 81 MMHG | HEART RATE: 76 BPM | BODY MASS INDEX: 32.02 KG/M2 | HEIGHT: 62 IN

## 2025-08-14 DIAGNOSIS — D64.9 ANEMIA, UNSPECIFIED TYPE: Primary | ICD-10-CM

## 2025-08-14 DIAGNOSIS — Z80.0 FAMILY HISTORY OF COLON CANCER: ICD-10-CM

## 2025-08-14 PROCEDURE — 99204 OFFICE O/P NEW MOD 45 MIN: CPT | Performed by: NURSE PRACTITIONER

## 2025-08-19 ENCOUNTER — HOSPITAL ENCOUNTER (OUTPATIENT)
Dept: ULTRASOUND IMAGING | Age: 45
Discharge: HOME | End: 2025-08-19
Attending: FAMILY MEDICINE

## 2025-08-19 ENCOUNTER — OFFICE VISIT (OUTPATIENT)
Dept: FAMILY MEDICINE CLINIC | Facility: CLINIC | Age: 45
End: 2025-08-19

## 2025-08-19 ENCOUNTER — LAB ENCOUNTER (OUTPATIENT)
Dept: LAB | Age: 45
End: 2025-08-19
Attending: FAMILY MEDICINE

## 2025-08-19 VITALS
DIASTOLIC BLOOD PRESSURE: 76 MMHG | BODY MASS INDEX: 32.76 KG/M2 | SYSTOLIC BLOOD PRESSURE: 124 MMHG | HEIGHT: 62 IN | WEIGHT: 178 LBS | HEART RATE: 78 BPM

## 2025-08-19 DIAGNOSIS — D64.9 ANEMIA, UNSPECIFIED TYPE: ICD-10-CM

## 2025-08-19 DIAGNOSIS — N92.0 MENORRHAGIA WITH REGULAR CYCLE: ICD-10-CM

## 2025-08-19 DIAGNOSIS — E11.9 TYPE 2 DIABETES MELLITUS WITHOUT COMPLICATION, WITH LONG-TERM CURRENT USE OF INSULIN (HCC): ICD-10-CM

## 2025-08-19 DIAGNOSIS — D64.9 ANEMIA, UNSPECIFIED TYPE: Primary | ICD-10-CM

## 2025-08-19 DIAGNOSIS — Z79.4 TYPE 2 DIABETES MELLITUS WITHOUT COMPLICATION, WITH LONG-TERM CURRENT USE OF INSULIN (HCC): ICD-10-CM

## 2025-08-19 LAB
BASOPHILS # BLD AUTO: 0.06 X10(3) UL (ref 0–0.2)
BASOPHILS NFR BLD AUTO: 1.2 %
DEPRECATED HBV CORE AB SER IA-ACNC: 12 NG/ML (ref 50–306)
DEPRECATED RDW RBC AUTO: 48.6 FL (ref 35.1–46.3)
EOSINOPHIL # BLD AUTO: 0.11 X10(3) UL (ref 0–0.7)
EOSINOPHIL NFR BLD AUTO: 2.2 %
ERYTHROCYTE [DISTWIDTH] IN BLOOD BY AUTOMATED COUNT: 15.5 % (ref 11–15)
HCT VFR BLD AUTO: 38.1 % (ref 35–48)
HGB BLD-MCNC: 12.3 G/DL (ref 12–16)
IMM GRANULOCYTES # BLD AUTO: 0.01 X10(3) UL (ref 0–1)
IMM GRANULOCYTES NFR BLD: 0.2 %
IRON SATN MFR SERPL: 16 % (ref 15–50)
IRON SERPL-MCNC: 64 UG/DL (ref 50–170)
LYMPHOCYTES # BLD AUTO: 1.86 X10(3) UL (ref 1–4)
LYMPHOCYTES NFR BLD AUTO: 36.6 %
MCH RBC QN AUTO: 27.5 PG (ref 26–34)
MCHC RBC AUTO-ENTMCNC: 32.3 G/DL (ref 31–37)
MCV RBC AUTO: 85 FL (ref 80–100)
MONOCYTES # BLD AUTO: 0.46 X10(3) UL (ref 0.1–1)
MONOCYTES NFR BLD AUTO: 9.1 %
NEUTROPHILS # BLD AUTO: 2.58 X10 (3) UL (ref 1.5–7.7)
NEUTROPHILS # BLD AUTO: 2.58 X10(3) UL (ref 1.5–7.7)
NEUTROPHILS NFR BLD AUTO: 50.7 %
PLATELET # BLD AUTO: 303 10(3)UL (ref 150–450)
RBC # BLD AUTO: 4.48 X10(6)UL (ref 3.8–5.3)
TOTAL IRON BINDING CAPACITY: 392 UG/DL (ref 250–425)
TRANSFERRIN SERPL-MCNC: 302 MG/DL (ref 250–380)
WBC # BLD AUTO: 5.1 X10(3) UL (ref 4–11)

## 2025-08-19 PROCEDURE — 83540 ASSAY OF IRON: CPT

## 2025-08-19 PROCEDURE — 76856 US EXAM PELVIC COMPLETE: CPT | Performed by: FAMILY MEDICINE

## 2025-08-19 PROCEDURE — 36415 COLL VENOUS BLD VENIPUNCTURE: CPT

## 2025-08-19 PROCEDURE — 85025 COMPLETE CBC W/AUTO DIFF WBC: CPT

## 2025-08-19 PROCEDURE — 76830 TRANSVAGINAL US NON-OB: CPT | Performed by: FAMILY MEDICINE

## 2025-08-19 PROCEDURE — 84466 ASSAY OF TRANSFERRIN: CPT

## 2025-08-19 PROCEDURE — 82728 ASSAY OF FERRITIN: CPT

## 2025-08-19 PROCEDURE — 99213 OFFICE O/P EST LOW 20 MIN: CPT | Performed by: FAMILY MEDICINE

## 2025-08-20 ENCOUNTER — TELEPHONE (OUTPATIENT)
Age: 45
End: 2025-08-20

## 2025-08-26 ENCOUNTER — OFFICE VISIT (OUTPATIENT)
Dept: OBGYN CLINIC | Facility: CLINIC | Age: 45
End: 2025-08-26

## 2025-08-26 VITALS
DIASTOLIC BLOOD PRESSURE: 81 MMHG | SYSTOLIC BLOOD PRESSURE: 124 MMHG | BODY MASS INDEX: 32.92 KG/M2 | HEIGHT: 62 IN | WEIGHT: 178.88 LBS

## 2025-08-26 DIAGNOSIS — D25.9 UTERINE LEIOMYOMA, UNSPECIFIED LOCATION: ICD-10-CM

## 2025-08-26 DIAGNOSIS — Z12.31 SCREENING MAMMOGRAM FOR BREAST CANCER: ICD-10-CM

## 2025-08-26 DIAGNOSIS — R10.2 PELVIC PRESSURE IN FEMALE: ICD-10-CM

## 2025-08-26 DIAGNOSIS — Z01.419 WELL WOMAN EXAM WITH ROUTINE GYNECOLOGICAL EXAM: Primary | ICD-10-CM

## 2025-08-26 PROCEDURE — 99203 OFFICE O/P NEW LOW 30 MIN: CPT | Performed by: OBSTETRICS & GYNECOLOGY

## 2025-08-26 PROCEDURE — 99386 PREV VISIT NEW AGE 40-64: CPT | Performed by: OBSTETRICS & GYNECOLOGY

## 2025-08-26 RX ORDER — ACETAMINOPHEN AND CODEINE PHOSPHATE 120; 12 MG/5ML; MG/5ML
0.35 SOLUTION ORAL DAILY
Qty: 84 TABLET | Refills: 1 | Status: SHIPPED | OUTPATIENT
Start: 2025-08-26 | End: 2026-08-26

## 2025-08-27 LAB — HPV E6+E7 MRNA CVX QL NAA+PROBE: NEGATIVE

## (undated) DEVICE — PROXIMATE SKIN STAPLERS (35 WIDE) CONTAINS 35 STAINLESS STEEL STAPLES (FIXED HEAD): Brand: PROXIMATE

## (undated) DEVICE — SOL  .9 3000ML

## (undated) DEVICE — SUTURE PLAIN GUT 3-0 CT-1

## (undated) DEVICE — VIOLET BRAIDED (POLYGLACTIN 910), SYNTHETIC ABSORBABLE SUTURE: Brand: COATED VICRYL

## (undated) DEVICE — LAPAROTOMY: Brand: MEDLINE INDUSTRIES, INC.

## (undated) DEVICE — COVER SGL STRL LGHT HNDL BLU

## (undated) DEVICE — 20 ML SYRINGE LUER-LOCK TIP: Brand: MONOJECT

## (undated) DEVICE — DRAPE SHEET LAPAROTOMY

## (undated) DEVICE — ENCORE® LATEX ACCLAIM SIZE 8.5, STERILE LATEX POWDER-FREE SURGICAL GLOVE: Brand: ENCORE

## (undated) DEVICE — WOLF INFLOW HYSTER

## (undated) DEVICE — STERILE LATEX POWDER-FREE SURGICAL GLOVESWITH NITRILE COATING: Brand: PROTEXIS

## (undated) DEVICE — 60 ML SYRINGE CATHETER TIP: Brand: MONOJECT

## (undated) DEVICE — SPONGE LAP 18X18 XRAY STRL

## (undated) DEVICE — HYSTEROSCOPY: Brand: MEDLINE INDUSTRIES, INC.

## (undated) DEVICE — STIRRUP STRAP W/SLING RING

## (undated) DEVICE — SOL  .9 1000ML BTL

## (undated) DEVICE — SUCTION CANISTER, 3000CC,SAFELINER: Brand: DEROYAL

## (undated) DEVICE — MEDI-VAC NON-CONDUCTIVE SUCTION TUBING: Brand: CARDINAL HEALTH

## (undated) DEVICE — SUTURE VICRYL 0 J340H

## (undated) DEVICE — Device

## (undated) NOTE — ED AVS SNAPSHOT
Arlette LUCIO Bandar Horne   MRN: [id number]    Department:  [facility] Emergency Department   Date of Visit:  1/23/2018           Disclosure     Insurance plans vary and the physician(s) referred by the ER may not be covered by your plan.  Please contact CARE PHYSICIAN AT ONCE OR RETURN IMMEDIATELY TO THE EMERGENCY DEPARTMENT. If you have been prescribed any medication(s), please fill your prescription right away and begin taking the medication(s) as directed.   If you believe that any of the medications

## (undated) NOTE — ED AVS SNAPSHOT
April R Zully Ceja   MRN: [id number]    Department:  [facility] Emergency Department   Date of Visit:  2/19/2020           Disclosure     Insurance plans vary and the physician(s) referred by the ER may not be covered by your plan.  Please contact CARE PHYSICIAN AT ONCE OR RETURN IMMEDIATELY TO THE EMERGENCY DEPARTMENT. If you have been prescribed any medication(s), please fill your prescription right away and begin taking the medication(s) as directed.   If you believe that any of the medications

## (undated) NOTE — LETTER
1/7/2022            To Whom It May Concern:    Arlette Luciano was seen in my office today. Patient may return to work on February 11, 2022. If you have any questions please feel free to contact our office.     Sincerely,    Sunshine Fermin MD  [facility]

## (undated) NOTE — LETTER
Patient Name: Shubham Kidney  : 10/24/1980  MRN: [id number]  Patient Address: [address] [place] [id number]      Coronavirus Disease 2019 (COVID-19)     [facility] is committed to the safety and well-being of our patients, me carefully. If your symptoms get worse, call your healthcare provider immediately. 3. Get rest and stay hydrated.    4. If you have a medical appointment, call the healthcare provider ahead of time and tell them that you have or may have COVID-19.  5. For m of fever-reducing medications; and  · Improvement in respiratory symptoms (e.g., cough, shortness of breath); and  · At least 10 days have passed since symptoms first appeared OR if asymptomatic patient or date of symptom onset is unclear then use 10 days donors must:    · Have had a confirmed diagnosis of COVID-19  · Be symptom-free for at least 14 days*    *Some people will be required to have a repeat COVID-19 test in order to be eligible to donate.  If you’re instructed by Wilber that a repeat test is r random. Researchers are trying to identify similarities between people with a Post-COVID condition to better understand if there are risk factors. How do I prevent a Post-COVID condition?   The best way to prevent the long-term symptoms of COVID-19 is

## (undated) NOTE — LETTER
Patient Name: Camelia Guerra  : 10/24/1980  MRN: [id number]  Patient Address: [address] [place] [id number]      Coronavirus Disease 2019 (COVID-19)     [facility] is committed to the safety and well-being of our patients, me carefully. If your symptoms get worse, call your healthcare provider immediately. 3. Get rest and stay hydrated.    4. If you have a medical appointment, call the healthcare provider ahead of time and tell them that you have or may have COVID-19.  5. For m of fever-reducing medications; and  · Improvement in respiratory symptoms (e.g., cough, shortness of breath); and  · At least 10 days have passed since symptoms first appeared OR if asymptomatic patient or date of symptom onset is unclear then use 10 days donors must:    · Have had a confirmed diagnosis of COVID-19  · Be symptom-free for at least 14 days*    *Some people will be required to have a repeat COVID-19 test in order to be eligible to donate.  If you’re instructed by [facility] that a repeat test is r random. Researchers are trying to identify similarities between people with a Post-COVID condition to better understand if there are risk factors. How do I prevent a Post-COVID condition?   The best way to prevent the long-term symptoms of COVID-19 is

## (undated) NOTE — LETTER
Date & Time: 1/31/2023, 8:05 PM  Patient: April KHADIJAH Collins  Encounter Provider(s):    Jhonny Julian       To Whom It May Concern:    Marck Valente was seen and treated in our department on 1/31/2023. She may return to work on 2/4/2023.     If you have any questions or concerns, please do not hesitate to call.        _____________________________  Physician/APC Signature

## (undated) NOTE — LETTER
Date & Time: 1/8/2020, 3:33 PM  Patient: April KHADIJAH Collins  Encounter Provider(s): Fausto Lozoya MD  [place], [place]       To Whom It May Concern:    Tosha Jiang was seen and treated in our department on 1/8/2020.  She may return to work on 01/10/

## (undated) NOTE — LETTER
Date & Time: 2/19/2020, 9:08 PM  Patient: April KHADIJAH Collins  Encounter Provider(s):    Blanca Hernandez MD       To Whom It May Concern:    Sally Khan was seen and treated in our department on 2/19/2020.  She should not return to work until Fever free

## (undated) NOTE — MR AVS SNAPSHOT
[facility] [place] [address] [address] [id number] [phone]               Thank you for choosing us for your health care visit with Mary Nazario MD.  We are glad to serve you and happy to provide you with this summary o Inhale into the lungs as needed. Pseudoephedrine HCl  MG Tb12   Take 1 tablet (120 mg total) by mouth every 12 (twelve) hours. Commonly known as:  SUDAFED 12 HOUR           SUMAtriptan Succinate 50 MG Tabs   Take  by mouth.    Commonly alleno walking, light jogging, cycling, swimming, etc.) for a goal of at least 150 minutes per week. Moderation of alcohol consumption Men: limit to <= 2 drinks* per day. Women and lighter weight persons: limit to <= 1 drink* per day.

## (undated) NOTE — LETTER
Date & Time: 1/30/2024, 12:37 PM  Patient: Arlette Collins  Encounter Provider(s):    Ze Olivo PA       To Whom It May Concern:    Arlette Collins was seen and treated in our department on 1/30/2024. She should not return to work until Friday  .    If you have any questions or concerns, please do not hesitate to call.      Ze Olivo   _____________________________  Physician/APC Signature

## (undated) NOTE — ED AVS SNAPSHOT
Arlette LUCIO Nicolette Iglesias   MRN: [id number]    Department:  [facility] Emergency Department   Date of Visit:  9/1/2019           Disclosure     Insurance plans vary and the physician(s) referred by the ER may not be covered by your plan.  Please contact y CARE PHYSICIAN AT ONCE OR RETURN IMMEDIATELY TO THE EMERGENCY DEPARTMENT. If you have been prescribed any medication(s), please fill your prescription right away and begin taking the medication(s) as directed.   If you believe that any of the medications

## (undated) NOTE — LETTER
[facility]  Administration of Anesthesia  1. I, April Brigette Santos, or _________________________________ acting on her behalf, (Patient) (Dependent/Representative) request to receive anesthesia for my pending procedure/operation/treatment.   A bleeding, seizure, cardiac arrest and death. 7. AWARENESS: I understand that it is possible (but unlikely) to have explicit memory of events from the operating room while under general anesthesia.   8. ELECTROCONVULSIVE THERAPY PATIENTS: This consent serve below affirms that prior to the time of the procedure, I have explained to the patient and/or his/her guardian, the risks and benefits of undergoing anesthesia, as well as any reasonable alternatives.     ___________________________________________________

## (undated) NOTE — Clinical Note
Discharge education and prescriptions given to patient. Patient is a/o x 4, ambulatory with steady gait at time of discharge. Patient in no apparent distress.

## (undated) NOTE — LETTER
VACCINE ADMINISTRATION RECORD  PARENT / GUARDIAN APPROVAL  Date: 10/12/2021  Vaccine administered to: Arlette Schwartz     : 10/24/1980    MRN: [id number]    A copy of the appropriate Centers for Disease Control and Prevention Vaccine Information stateme

## (undated) NOTE — LETTER
Date & Time: 6/9/2023, 9:17 AM  Patient: April KHADIJAH Collins  Encounter Provider(s):    RAMILA Fuentes       To Whom It May Concern:    Rafat Oden was seen and treated in our department on 6/9/2023. She should not return to work until 6/15/2023 .     If you have any questions or concerns, please do not hesitate to call.        _____________________________  Physician/APC Signature

## (undated) NOTE — LETTER
January 23, 2018    Patient: Juan Carlos Godoy   Date of Visit: 1/23/2018       To Whom It May Concern:    Sally Khan was seen and treated in our emergency department on 1/23/2018. She is excused from work for 2-3 days.     If you have any questions or

## (undated) NOTE — ED AVS SNAPSHOT
Arlette LUCIO Charmian Hodgkin   MRN: [id number]    Department:  [facility] Emergency Department   Date of Visit:  1/8/2020           Disclosure     Insurance plans vary and the physician(s) referred by the ER may not be covered by your plan.  Please contact your tell this physician (or your personal doctor if your instructions are to return to your personal doctor) about any new or lasting problems. The primary care or specialist physician will see patients referred from the [facility] Emergency Department.  Gabino Xavier